# Patient Record
Sex: FEMALE | Race: WHITE | NOT HISPANIC OR LATINO | Employment: FULL TIME | URBAN - METROPOLITAN AREA
[De-identification: names, ages, dates, MRNs, and addresses within clinical notes are randomized per-mention and may not be internally consistent; named-entity substitution may affect disease eponyms.]

---

## 2017-01-16 ENCOUNTER — OFFICE VISIT - HEALTHEAST (OUTPATIENT)
Dept: FAMILY MEDICINE | Facility: CLINIC | Age: 27
End: 2017-01-16

## 2017-01-16 DIAGNOSIS — L70.0 ACNE VULGARIS: ICD-10-CM

## 2017-01-16 DIAGNOSIS — E03.9 HYPOTHYROIDISM: ICD-10-CM

## 2017-01-16 DIAGNOSIS — J02.9 SORE THROAT: ICD-10-CM

## 2017-01-16 DIAGNOSIS — J01.90 ACUTE SINUSITIS: ICD-10-CM

## 2017-03-16 ENCOUNTER — COMMUNICATION - HEALTHEAST (OUTPATIENT)
Dept: LAB | Facility: CLINIC | Age: 27
End: 2017-03-16

## 2017-03-16 DIAGNOSIS — E03.9 HYPOTHYROIDISM: ICD-10-CM

## 2017-03-17 ENCOUNTER — AMBULATORY - HEALTHEAST (OUTPATIENT)
Dept: LAB | Facility: CLINIC | Age: 27
End: 2017-03-17

## 2017-03-17 DIAGNOSIS — E03.9 HYPOTHYROIDISM: ICD-10-CM

## 2017-04-06 ENCOUNTER — COMMUNICATION - HEALTHEAST (OUTPATIENT)
Dept: FAMILY MEDICINE | Facility: CLINIC | Age: 27
End: 2017-04-06

## 2017-04-06 DIAGNOSIS — E03.9 HYPOTHYROIDISM: ICD-10-CM

## 2017-04-28 ENCOUNTER — OFFICE VISIT - HEALTHEAST (OUTPATIENT)
Dept: FAMILY MEDICINE | Facility: CLINIC | Age: 27
End: 2017-04-28

## 2017-04-28 DIAGNOSIS — Z00.00 ROUTINE GENERAL MEDICAL EXAMINATION AT A HEALTH CARE FACILITY: ICD-10-CM

## 2017-04-28 DIAGNOSIS — D64.9 ANEMIA, UNSPECIFIED: ICD-10-CM

## 2017-04-28 DIAGNOSIS — L70.9 ACNE: ICD-10-CM

## 2017-04-28 DIAGNOSIS — T78.40XA ALLERGIC REACTION: ICD-10-CM

## 2017-04-28 LAB
CHOLEST SERPL-MCNC: 153 MG/DL
FASTING STATUS PATIENT QL REPORTED: YES
HDLC SERPL-MCNC: 48 MG/DL
LDLC SERPL CALC-MCNC: 67 MG/DL
TRIGL SERPL-MCNC: 192 MG/DL

## 2017-04-28 ASSESSMENT — MIFFLIN-ST. JEOR: SCORE: 1479.62

## 2017-05-04 LAB
BKR LAB AP ABNORMAL BLEEDING: NO
BKR LAB AP BIRTH CONTROL/HORMONES: NORMAL
BKR LAB AP CERVICAL APPEARANCE: NORMAL
BKR LAB AP GYN ADEQUACY: NORMAL
BKR LAB AP GYN INTERPRETATION: NORMAL
BKR LAB AP HPV REFLEX: NORMAL
BKR LAB AP LMP: NORMAL
BKR LAB AP PATIENT STATUS: NORMAL
BKR LAB AP PREVIOUS ABNORMAL: NORMAL
BKR LAB AP PREVIOUS NORMAL: NORMAL
HIGH RISK?: NO
HPV INTERPRETATION - HISTORICAL: NORMAL
HPV INTERPRETER - HISTORICAL: NORMAL
PATH REPORT.COMMENTS IMP SPEC: NORMAL
RESULT FLAG (HE HISTORICAL CONVERSION): NORMAL

## 2017-07-04 ENCOUNTER — COMMUNICATION - HEALTHEAST (OUTPATIENT)
Dept: FAMILY MEDICINE | Facility: CLINIC | Age: 27
End: 2017-07-04

## 2017-07-04 DIAGNOSIS — E03.9 HYPOTHYROIDISM: ICD-10-CM

## 2017-08-20 ENCOUNTER — COMMUNICATION - HEALTHEAST (OUTPATIENT)
Dept: FAMILY MEDICINE | Facility: CLINIC | Age: 27
End: 2017-08-20

## 2017-09-30 ENCOUNTER — COMMUNICATION - HEALTHEAST (OUTPATIENT)
Dept: FAMILY MEDICINE | Facility: CLINIC | Age: 27
End: 2017-09-30

## 2017-09-30 DIAGNOSIS — L70.9 ACNE: ICD-10-CM

## 2017-12-10 ENCOUNTER — COMMUNICATION - HEALTHEAST (OUTPATIENT)
Dept: FAMILY MEDICINE | Facility: CLINIC | Age: 27
End: 2017-12-10

## 2017-12-10 DIAGNOSIS — E03.9 HYPOTHYROIDISM: ICD-10-CM

## 2018-01-16 ENCOUNTER — OFFICE VISIT - HEALTHEAST (OUTPATIENT)
Dept: FAMILY MEDICINE | Facility: CLINIC | Age: 28
End: 2018-01-16

## 2018-01-16 DIAGNOSIS — F39 MOOD DISORDER (H): ICD-10-CM

## 2018-01-16 DIAGNOSIS — F41.9 ANXIETY: ICD-10-CM

## 2018-01-16 DIAGNOSIS — E03.9 HYPOTHYROIDISM: ICD-10-CM

## 2018-01-16 LAB
T4 FREE SERPL-MCNC: 1.2 NG/DL (ref 0.7–1.8)
TSH SERPL DL<=0.005 MIU/L-ACNC: 1.85 UIU/ML (ref 0.3–5)

## 2018-01-17 ENCOUNTER — AMBULATORY - HEALTHEAST (OUTPATIENT)
Dept: FAMILY MEDICINE | Facility: CLINIC | Age: 28
End: 2018-01-17

## 2018-01-17 DIAGNOSIS — F41.9 ANXIETY: ICD-10-CM

## 2018-01-17 LAB — 25(OH)D3 SERPL-MCNC: 30 NG/ML (ref 30–80)

## 2018-01-30 ENCOUNTER — COMMUNICATION - HEALTHEAST (OUTPATIENT)
Dept: FAMILY MEDICINE | Facility: CLINIC | Age: 28
End: 2018-01-30

## 2018-01-30 DIAGNOSIS — F39 MOOD DISORDER (H): ICD-10-CM

## 2018-01-30 DIAGNOSIS — F41.9 ANXIETY: ICD-10-CM

## 2018-02-11 ENCOUNTER — COMMUNICATION - HEALTHEAST (OUTPATIENT)
Dept: FAMILY MEDICINE | Facility: CLINIC | Age: 28
End: 2018-02-11

## 2018-02-11 ENCOUNTER — COMMUNICATION - HEALTHEAST (OUTPATIENT)
Dept: SCHEDULING | Facility: CLINIC | Age: 28
End: 2018-02-11

## 2018-02-11 DIAGNOSIS — A09 TRAVELER'S DIARRHEA: ICD-10-CM

## 2018-02-11 DIAGNOSIS — F41.9 ANXIETY: ICD-10-CM

## 2018-02-13 ENCOUNTER — COMMUNICATION - HEALTHEAST (OUTPATIENT)
Dept: FAMILY MEDICINE | Facility: CLINIC | Age: 28
End: 2018-02-13

## 2018-02-13 DIAGNOSIS — F41.9 ANXIETY: ICD-10-CM

## 2018-03-04 ENCOUNTER — COMMUNICATION - HEALTHEAST (OUTPATIENT)
Dept: FAMILY MEDICINE | Facility: CLINIC | Age: 28
End: 2018-03-04

## 2018-03-04 DIAGNOSIS — L70.9 ACNE: ICD-10-CM

## 2018-03-25 ENCOUNTER — COMMUNICATION - HEALTHEAST (OUTPATIENT)
Dept: FAMILY MEDICINE | Facility: CLINIC | Age: 28
End: 2018-03-25

## 2018-03-25 DIAGNOSIS — F41.9 ANXIETY: ICD-10-CM

## 2018-04-02 ENCOUNTER — COMMUNICATION - HEALTHEAST (OUTPATIENT)
Dept: ADMINISTRATIVE | Facility: CLINIC | Age: 28
End: 2018-04-02

## 2018-06-02 ENCOUNTER — COMMUNICATION - HEALTHEAST (OUTPATIENT)
Dept: FAMILY MEDICINE | Facility: CLINIC | Age: 28
End: 2018-06-02

## 2018-06-02 DIAGNOSIS — E03.9 HYPOTHYROIDISM: ICD-10-CM

## 2018-07-19 ENCOUNTER — COMMUNICATION - HEALTHEAST (OUTPATIENT)
Dept: FAMILY MEDICINE | Facility: CLINIC | Age: 28
End: 2018-07-19

## 2018-08-13 ENCOUNTER — COMMUNICATION - HEALTHEAST (OUTPATIENT)
Dept: FAMILY MEDICINE | Facility: CLINIC | Age: 28
End: 2018-08-13

## 2018-08-13 DIAGNOSIS — E03.9 HYPOTHYROIDISM: ICD-10-CM

## 2018-08-25 ENCOUNTER — COMMUNICATION - HEALTHEAST (OUTPATIENT)
Dept: FAMILY MEDICINE | Facility: CLINIC | Age: 28
End: 2018-08-25

## 2018-08-25 DIAGNOSIS — L70.9 ACNE: ICD-10-CM

## 2018-09-28 ENCOUNTER — OFFICE VISIT - HEALTHEAST (OUTPATIENT)
Dept: FAMILY MEDICINE | Facility: CLINIC | Age: 28
End: 2018-09-28

## 2018-09-28 DIAGNOSIS — E06.3 HYPOTHYROIDISM DUE TO HASHIMOTO'S THYROIDITIS: ICD-10-CM

## 2018-09-28 DIAGNOSIS — Z79.899 HIGH RISK MEDICATION USE: ICD-10-CM

## 2018-09-28 DIAGNOSIS — L70.0 ACNE VULGARIS: ICD-10-CM

## 2018-09-28 DIAGNOSIS — F34.1 DYSTHYMIC DISORDER: ICD-10-CM

## 2018-09-28 DIAGNOSIS — J30.9 ALLERGIC RHINITIS: ICD-10-CM

## 2018-09-28 LAB
ALBUMIN SERPL-MCNC: 3.8 G/DL (ref 3.5–5)
ALP SERPL-CCNC: 34 U/L (ref 45–120)
ALT SERPL W P-5'-P-CCNC: 17 U/L (ref 0–45)
ANION GAP SERPL CALCULATED.3IONS-SCNC: 8 MMOL/L (ref 5–18)
AST SERPL W P-5'-P-CCNC: 13 U/L (ref 0–40)
BILIRUB SERPL-MCNC: 0.4 MG/DL (ref 0–1)
BUN SERPL-MCNC: 8 MG/DL (ref 8–22)
CALCIUM SERPL-MCNC: 9.4 MG/DL (ref 8.5–10.5)
CHLORIDE BLD-SCNC: 106 MMOL/L (ref 98–107)
CO2 SERPL-SCNC: 25 MMOL/L (ref 22–31)
CREAT SERPL-MCNC: 0.79 MG/DL (ref 0.6–1.1)
GFR SERPL CREATININE-BSD FRML MDRD: >60 ML/MIN/1.73M2
GLUCOSE BLD-MCNC: 90 MG/DL (ref 70–125)
POTASSIUM BLD-SCNC: 4.1 MMOL/L (ref 3.5–5)
PROT SERPL-MCNC: 6.5 G/DL (ref 6–8)
SODIUM SERPL-SCNC: 139 MMOL/L (ref 136–145)
TSH SERPL DL<=0.005 MIU/L-ACNC: 0.19 UIU/ML (ref 0.3–5)

## 2018-10-01 ENCOUNTER — COMMUNICATION - HEALTHEAST (OUTPATIENT)
Dept: FAMILY MEDICINE | Facility: CLINIC | Age: 28
End: 2018-10-01

## 2019-06-25 ENCOUNTER — COMMUNICATION - HEALTHEAST (OUTPATIENT)
Dept: FAMILY MEDICINE | Facility: CLINIC | Age: 29
End: 2019-06-25

## 2019-07-03 ENCOUNTER — AMBULATORY - HEALTHEAST (OUTPATIENT)
Dept: LAB | Facility: CLINIC | Age: 29
End: 2019-07-03

## 2019-07-03 DIAGNOSIS — E06.3 HYPOTHYROIDISM DUE TO HASHIMOTO'S THYROIDITIS: ICD-10-CM

## 2019-07-03 LAB — TSH SERPL DL<=0.005 MIU/L-ACNC: 1.86 UIU/ML (ref 0.3–5)

## 2019-07-05 ENCOUNTER — COMMUNICATION - HEALTHEAST (OUTPATIENT)
Dept: FAMILY MEDICINE | Facility: CLINIC | Age: 29
End: 2019-07-05

## 2019-07-10 ENCOUNTER — COMMUNICATION - HEALTHEAST (OUTPATIENT)
Dept: FAMILY MEDICINE | Facility: CLINIC | Age: 29
End: 2019-07-10

## 2019-09-24 ENCOUNTER — COMMUNICATION - HEALTHEAST (OUTPATIENT)
Dept: FAMILY MEDICINE | Facility: CLINIC | Age: 29
End: 2019-09-24

## 2019-09-26 ENCOUNTER — COMMUNICATION - HEALTHEAST (OUTPATIENT)
Dept: FAMILY MEDICINE | Facility: CLINIC | Age: 29
End: 2019-09-26

## 2019-10-02 ENCOUNTER — COMMUNICATION - HEALTHEAST (OUTPATIENT)
Dept: FAMILY MEDICINE | Facility: CLINIC | Age: 29
End: 2019-10-02

## 2019-10-02 ENCOUNTER — OFFICE VISIT - HEALTHEAST (OUTPATIENT)
Dept: FAMILY MEDICINE | Facility: CLINIC | Age: 29
End: 2019-10-02

## 2019-10-02 DIAGNOSIS — F51.01 PRIMARY INSOMNIA: ICD-10-CM

## 2019-10-02 DIAGNOSIS — Z30.9 ENCOUNTER FOR CONTRACEPTIVE MANAGEMENT, UNSPECIFIED TYPE: ICD-10-CM

## 2019-10-02 RX ORDER — CETIRIZINE HYDROCHLORIDE 10 MG/1
10 TABLET ORAL DAILY
Status: SHIPPED | COMMUNITY
Start: 2019-10-02

## 2019-10-02 ASSESSMENT — MIFFLIN-ST. JEOR: SCORE: 1450.13

## 2019-11-02 ENCOUNTER — COMMUNICATION - HEALTHEAST (OUTPATIENT)
Dept: FAMILY MEDICINE | Facility: CLINIC | Age: 29
End: 2019-11-02

## 2019-11-02 DIAGNOSIS — F51.01 PRIMARY INSOMNIA: ICD-10-CM

## 2019-11-05 ENCOUNTER — COMMUNICATION - HEALTHEAST (OUTPATIENT)
Dept: FAMILY MEDICINE | Facility: CLINIC | Age: 29
End: 2019-11-05

## 2019-11-22 ENCOUNTER — OFFICE VISIT - HEALTHEAST (OUTPATIENT)
Dept: FAMILY MEDICINE | Facility: CLINIC | Age: 29
End: 2019-11-22

## 2019-11-22 DIAGNOSIS — F34.1 DYSTHYMIC DISORDER: ICD-10-CM

## 2019-11-22 DIAGNOSIS — E03.9 ACQUIRED HYPOTHYROIDISM: ICD-10-CM

## 2019-11-22 DIAGNOSIS — F51.01 PRIMARY INSOMNIA: ICD-10-CM

## 2019-11-22 DIAGNOSIS — L70.0 ACNE VULGARIS: ICD-10-CM

## 2019-11-22 LAB
ALBUMIN SERPL-MCNC: 4 G/DL (ref 3.5–5)
ALP SERPL-CCNC: 33 U/L (ref 45–120)
ALT SERPL W P-5'-P-CCNC: 15 U/L (ref 0–45)
ANION GAP SERPL CALCULATED.3IONS-SCNC: 9 MMOL/L (ref 5–18)
AST SERPL W P-5'-P-CCNC: 13 U/L (ref 0–40)
BILIRUB SERPL-MCNC: 0.5 MG/DL (ref 0–1)
BUN SERPL-MCNC: 8 MG/DL (ref 8–22)
CALCIUM SERPL-MCNC: 9.5 MG/DL (ref 8.5–10.5)
CHLORIDE BLD-SCNC: 108 MMOL/L (ref 98–107)
CO2 SERPL-SCNC: 24 MMOL/L (ref 22–31)
CREAT SERPL-MCNC: 0.9 MG/DL (ref 0.6–1.1)
GFR SERPL CREATININE-BSD FRML MDRD: >60 ML/MIN/1.73M2
GLUCOSE BLD-MCNC: 72 MG/DL (ref 70–125)
POTASSIUM BLD-SCNC: 4.7 MMOL/L (ref 3.5–5)
PROT SERPL-MCNC: 6.9 G/DL (ref 6–8)
SODIUM SERPL-SCNC: 141 MMOL/L (ref 136–145)

## 2019-11-22 ASSESSMENT — ANXIETY QUESTIONNAIRES
4. TROUBLE RELAXING: SEVERAL DAYS
1. FEELING NERVOUS, ANXIOUS, OR ON EDGE: SEVERAL DAYS
7. FEELING AFRAID AS IF SOMETHING AWFUL MIGHT HAPPEN: NOT AT ALL
3. WORRYING TOO MUCH ABOUT DIFFERENT THINGS: SEVERAL DAYS
6. BECOMING EASILY ANNOYED OR IRRITABLE: SEVERAL DAYS
IF YOU CHECKED OFF ANY PROBLEMS ON THIS QUESTIONNAIRE, HOW DIFFICULT HAVE THESE PROBLEMS MADE IT FOR YOU TO DO YOUR WORK, TAKE CARE OF THINGS AT HOME, OR GET ALONG WITH OTHER PEOPLE: SOMEWHAT DIFFICULT
GAD7 TOTAL SCORE: 5
2. NOT BEING ABLE TO STOP OR CONTROL WORRYING: SEVERAL DAYS
5. BEING SO RESTLESS THAT IT IS HARD TO SIT STILL: NOT AT ALL

## 2019-11-22 ASSESSMENT — PATIENT HEALTH QUESTIONNAIRE - PHQ9: SUM OF ALL RESPONSES TO PHQ QUESTIONS 1-9: 7

## 2019-12-06 ENCOUNTER — RECORDS - HEALTHEAST (OUTPATIENT)
Dept: ADMINISTRATIVE | Facility: OTHER | Age: 29
End: 2019-12-06

## 2019-12-06 ENCOUNTER — OFFICE VISIT - HEALTHEAST (OUTPATIENT)
Dept: SLEEP MEDICINE | Facility: CLINIC | Age: 29
End: 2019-12-06

## 2019-12-06 DIAGNOSIS — G47.8 NON-RESTORATIVE SLEEP: ICD-10-CM

## 2019-12-06 DIAGNOSIS — G47.00 INSOMNIA, PERSISTENT: ICD-10-CM

## 2019-12-06 DIAGNOSIS — G47.10 HYPERSOMNIA: ICD-10-CM

## 2019-12-06 DIAGNOSIS — R06.83 SNORING: ICD-10-CM

## 2019-12-06 DIAGNOSIS — G47.21 CIRCADIAN RHYTHM SLEEP DISORDER, DELAYED SLEEP PHASE TYPE: ICD-10-CM

## 2019-12-06 ASSESSMENT — MIFFLIN-ST. JEOR: SCORE: 1441.06

## 2020-01-03 ENCOUNTER — COMMUNICATION - HEALTHEAST (OUTPATIENT)
Dept: FAMILY MEDICINE | Facility: CLINIC | Age: 30
End: 2020-01-03

## 2020-01-03 DIAGNOSIS — E06.3 HYPOTHYROIDISM DUE TO HASHIMOTO'S THYROIDITIS: ICD-10-CM

## 2020-02-10 ENCOUNTER — COMMUNICATION - HEALTHEAST (OUTPATIENT)
Dept: FAMILY MEDICINE | Facility: CLINIC | Age: 30
End: 2020-02-10

## 2020-02-10 DIAGNOSIS — L70.0 ACNE VULGARIS: ICD-10-CM

## 2020-06-22 ENCOUNTER — COMMUNICATION - HEALTHEAST (OUTPATIENT)
Dept: FAMILY MEDICINE | Facility: CLINIC | Age: 30
End: 2020-06-22

## 2020-06-22 DIAGNOSIS — E06.3 HYPOTHYROIDISM DUE TO HASHIMOTO'S THYROIDITIS: ICD-10-CM

## 2020-09-18 ENCOUNTER — OFFICE VISIT - HEALTHEAST (OUTPATIENT)
Dept: FAMILY MEDICINE | Facility: CLINIC | Age: 30
End: 2020-09-18

## 2020-09-18 DIAGNOSIS — D64.9 ANEMIA, UNSPECIFIED TYPE: ICD-10-CM

## 2020-09-18 DIAGNOSIS — E03.9 ACQUIRED HYPOTHYROIDISM: ICD-10-CM

## 2020-09-18 DIAGNOSIS — F51.01 PRIMARY INSOMNIA: ICD-10-CM

## 2020-09-18 DIAGNOSIS — R53.83 FATIGUE, UNSPECIFIED TYPE: ICD-10-CM

## 2020-09-18 DIAGNOSIS — J30.2 SEASONAL ALLERGIC RHINITIS, UNSPECIFIED TRIGGER: ICD-10-CM

## 2020-09-18 DIAGNOSIS — F34.1 DYSTHYMIC DISORDER: ICD-10-CM

## 2020-09-18 LAB
ALBUMIN SERPL-MCNC: 3.8 G/DL (ref 3.5–5)
ALP SERPL-CCNC: 36 U/L (ref 45–120)
ALT SERPL W P-5'-P-CCNC: 18 U/L (ref 0–45)
ANION GAP SERPL CALCULATED.3IONS-SCNC: 10 MMOL/L (ref 5–18)
AST SERPL W P-5'-P-CCNC: 12 U/L (ref 0–40)
BILIRUB SERPL-MCNC: 0.3 MG/DL (ref 0–1)
BUN SERPL-MCNC: 9 MG/DL (ref 8–22)
CALCIUM SERPL-MCNC: 9.5 MG/DL (ref 8.5–10.5)
CHLORIDE BLD-SCNC: 105 MMOL/L (ref 98–107)
CO2 SERPL-SCNC: 25 MMOL/L (ref 22–31)
CREAT SERPL-MCNC: 0.9 MG/DL (ref 0.6–1.1)
ERYTHROCYTE [DISTWIDTH] IN BLOOD BY AUTOMATED COUNT: 11.1 % (ref 11–14.5)
GFR SERPL CREATININE-BSD FRML MDRD: >60 ML/MIN/1.73M2
GLUCOSE BLD-MCNC: 95 MG/DL (ref 70–125)
HCT VFR BLD AUTO: 43.4 % (ref 35–47)
HGB BLD-MCNC: 14.9 G/DL (ref 12–16)
MCH RBC QN AUTO: 30.1 PG (ref 27–34)
MCHC RBC AUTO-ENTMCNC: 34.4 G/DL (ref 32–36)
MCV RBC AUTO: 88 FL (ref 80–100)
PLATELET # BLD AUTO: 230 THOU/UL (ref 140–440)
PMV BLD AUTO: 7.6 FL (ref 7–10)
POTASSIUM BLD-SCNC: 4.4 MMOL/L (ref 3.5–5)
PROT SERPL-MCNC: 6.8 G/DL (ref 6–8)
RBC # BLD AUTO: 4.96 MILL/UL (ref 3.8–5.4)
SODIUM SERPL-SCNC: 140 MMOL/L (ref 136–145)
T4 FREE SERPL-MCNC: 1 NG/DL (ref 0.7–1.8)
TSH SERPL DL<=0.005 MIU/L-ACNC: 3.39 UIU/ML (ref 0.3–5)
WBC: 4.6 THOU/UL (ref 4–11)

## 2020-09-18 RX ORDER — AZELASTINE 1 MG/ML
1 SPRAY, METERED NASAL 2 TIMES DAILY
Qty: 30 ML | Refills: 0 | Status: SHIPPED | OUTPATIENT
Start: 2020-09-18 | End: 2022-12-30

## 2020-09-18 RX ORDER — FAMOTIDINE 20 MG/1
20 TABLET, FILM COATED ORAL 2 TIMES DAILY
Status: SHIPPED | COMMUNITY
Start: 2020-09-18 | End: 2021-10-06

## 2020-09-18 ASSESSMENT — ANXIETY QUESTIONNAIRES
5. BEING SO RESTLESS THAT IT IS HARD TO SIT STILL: NOT AT ALL
6. BECOMING EASILY ANNOYED OR IRRITABLE: SEVERAL DAYS
IF YOU CHECKED OFF ANY PROBLEMS ON THIS QUESTIONNAIRE, HOW DIFFICULT HAVE THESE PROBLEMS MADE IT FOR YOU TO DO YOUR WORK, TAKE CARE OF THINGS AT HOME, OR GET ALONG WITH OTHER PEOPLE: NOT DIFFICULT AT ALL
7. FEELING AFRAID AS IF SOMETHING AWFUL MIGHT HAPPEN: NOT AT ALL
4. TROUBLE RELAXING: SEVERAL DAYS
GAD7 TOTAL SCORE: 5
2. NOT BEING ABLE TO STOP OR CONTROL WORRYING: SEVERAL DAYS
1. FEELING NERVOUS, ANXIOUS, OR ON EDGE: SEVERAL DAYS
3. WORRYING TOO MUCH ABOUT DIFFERENT THINGS: SEVERAL DAYS

## 2020-09-18 ASSESSMENT — MIFFLIN-ST. JEOR: SCORE: 1474.51

## 2020-09-18 ASSESSMENT — PATIENT HEALTH QUESTIONNAIRE - PHQ9: SUM OF ALL RESPONSES TO PHQ QUESTIONS 1-9: 6

## 2020-09-29 ENCOUNTER — COMMUNICATION - HEALTHEAST (OUTPATIENT)
Dept: FAMILY MEDICINE | Facility: CLINIC | Age: 30
End: 2020-09-29

## 2020-09-29 DIAGNOSIS — Z30.9 ENCOUNTER FOR CONTRACEPTIVE MANAGEMENT, UNSPECIFIED TYPE: ICD-10-CM

## 2020-11-05 ENCOUNTER — OFFICE VISIT - HEALTHEAST (OUTPATIENT)
Dept: FAMILY MEDICINE | Facility: CLINIC | Age: 30
End: 2020-11-05

## 2020-11-05 DIAGNOSIS — R53.82 CHRONIC FATIGUE: ICD-10-CM

## 2020-11-05 ASSESSMENT — ANXIETY QUESTIONNAIRES
GAD7 TOTAL SCORE: 9
3. WORRYING TOO MUCH ABOUT DIFFERENT THINGS: MORE THAN HALF THE DAYS
2. NOT BEING ABLE TO STOP OR CONTROL WORRYING: MORE THAN HALF THE DAYS
IF YOU CHECKED OFF ANY PROBLEMS ON THIS QUESTIONNAIRE, HOW DIFFICULT HAVE THESE PROBLEMS MADE IT FOR YOU TO DO YOUR WORK, TAKE CARE OF THINGS AT HOME, OR GET ALONG WITH OTHER PEOPLE: SOMEWHAT DIFFICULT
7. FEELING AFRAID AS IF SOMETHING AWFUL MIGHT HAPPEN: NOT AT ALL
6. BECOMING EASILY ANNOYED OR IRRITABLE: NOT AT ALL
5. BEING SO RESTLESS THAT IT IS HARD TO SIT STILL: MORE THAN HALF THE DAYS
1. FEELING NERVOUS, ANXIOUS, OR ON EDGE: MORE THAN HALF THE DAYS
4. TROUBLE RELAXING: SEVERAL DAYS

## 2020-11-05 ASSESSMENT — PATIENT HEALTH QUESTIONNAIRE - PHQ9: SUM OF ALL RESPONSES TO PHQ QUESTIONS 1-9: 10

## 2020-12-29 ENCOUNTER — OFFICE VISIT - HEALTHEAST (OUTPATIENT)
Dept: FAMILY MEDICINE | Facility: CLINIC | Age: 30
End: 2020-12-29

## 2020-12-29 DIAGNOSIS — M79.671 RIGHT FOOT PAIN: ICD-10-CM

## 2020-12-29 ASSESSMENT — MIFFLIN-ST. JEOR: SCORE: 1472.81

## 2021-01-06 ENCOUNTER — OFFICE VISIT - HEALTHEAST (OUTPATIENT)
Dept: PODIATRY | Facility: CLINIC | Age: 31
End: 2021-01-06

## 2021-01-06 DIAGNOSIS — G58.8 NEUROMA DIGITAL NERVE: ICD-10-CM

## 2021-01-06 ASSESSMENT — MIFFLIN-ST. JEOR: SCORE: 1472.81

## 2021-01-08 ENCOUNTER — COMMUNICATION - HEALTHEAST (OUTPATIENT)
Dept: OTHER | Facility: CLINIC | Age: 31
End: 2021-01-08

## 2021-01-13 ENCOUNTER — COMMUNICATION - HEALTHEAST (OUTPATIENT)
Dept: OTHER | Facility: CLINIC | Age: 31
End: 2021-01-13

## 2021-01-20 ENCOUNTER — COMMUNICATION - HEALTHEAST (OUTPATIENT)
Dept: OTHER | Facility: CLINIC | Age: 31
End: 2021-01-20

## 2021-01-28 ENCOUNTER — AMBULATORY - HEALTHEAST (OUTPATIENT)
Dept: NURSING | Facility: CLINIC | Age: 31
End: 2021-01-28

## 2021-01-29 ENCOUNTER — OFFICE VISIT - HEALTHEAST (OUTPATIENT)
Dept: FAMILY MEDICINE | Facility: CLINIC | Age: 31
End: 2021-01-29

## 2021-01-29 DIAGNOSIS — Z00.00 ROUTINE GENERAL MEDICAL EXAMINATION AT A HEALTH CARE FACILITY: ICD-10-CM

## 2021-01-29 DIAGNOSIS — E03.9 ACQUIRED HYPOTHYROIDISM: ICD-10-CM

## 2021-01-29 DIAGNOSIS — D64.9 ANEMIA, UNSPECIFIED TYPE: ICD-10-CM

## 2021-01-29 DIAGNOSIS — K21.9 GASTROESOPHAGEAL REFLUX DISEASE WITHOUT ESOPHAGITIS: ICD-10-CM

## 2021-01-29 DIAGNOSIS — F34.1 DYSTHYMIC DISORDER: ICD-10-CM

## 2021-01-29 DIAGNOSIS — L70.0 ACNE VULGARIS: ICD-10-CM

## 2021-01-29 DIAGNOSIS — J30.9 ALLERGIC RHINITIS, UNSPECIFIED SEASONALITY, UNSPECIFIED TRIGGER: ICD-10-CM

## 2021-01-29 DIAGNOSIS — F51.01 PRIMARY INSOMNIA: ICD-10-CM

## 2021-01-29 DIAGNOSIS — Z30.9 ENCOUNTER FOR CONTRACEPTIVE MANAGEMENT, UNSPECIFIED TYPE: ICD-10-CM

## 2021-01-29 LAB
ALBUMIN SERPL-MCNC: 3.9 G/DL (ref 3.5–5)
ALBUMIN UR-MCNC: NEGATIVE MG/DL
ALP SERPL-CCNC: 43 U/L (ref 45–120)
ALT SERPL W P-5'-P-CCNC: 22 U/L (ref 0–45)
ANION GAP SERPL CALCULATED.3IONS-SCNC: 6 MMOL/L (ref 5–18)
APPEARANCE UR: CLEAR
AST SERPL W P-5'-P-CCNC: 15 U/L (ref 0–40)
BACTERIA #/AREA URNS HPF: ABNORMAL HPF
BILIRUB SERPL-MCNC: 0.6 MG/DL (ref 0–1)
BILIRUB UR QL STRIP: NEGATIVE
BUN SERPL-MCNC: 8 MG/DL (ref 8–22)
CALCIUM SERPL-MCNC: 9.1 MG/DL (ref 8.5–10.5)
CHLORIDE BLD-SCNC: 107 MMOL/L (ref 98–107)
CHOLEST SERPL-MCNC: 123 MG/DL
CO2 SERPL-SCNC: 27 MMOL/L (ref 22–31)
COLOR UR AUTO: YELLOW
CREAT SERPL-MCNC: 0.8 MG/DL (ref 0.6–1.1)
ERYTHROCYTE [DISTWIDTH] IN BLOOD BY AUTOMATED COUNT: 11.1 % (ref 11–14.5)
FASTING STATUS PATIENT QL REPORTED: YES
GFR SERPL CREATININE-BSD FRML MDRD: >60 ML/MIN/1.73M2
GLUCOSE BLD-MCNC: 76 MG/DL (ref 70–125)
GLUCOSE UR STRIP-MCNC: NEGATIVE MG/DL
HCT VFR BLD AUTO: 42.9 % (ref 35–47)
HDLC SERPL-MCNC: 51 MG/DL
HGB BLD-MCNC: 14.3 G/DL (ref 12–16)
HGB UR QL STRIP: ABNORMAL
KETONES UR STRIP-MCNC: NEGATIVE MG/DL
LDLC SERPL CALC-MCNC: 58 MG/DL
LEUKOCYTE ESTERASE UR QL STRIP: NEGATIVE
MCH RBC QN AUTO: 29.7 PG (ref 27–34)
MCHC RBC AUTO-ENTMCNC: 33.4 G/DL (ref 32–36)
MCV RBC AUTO: 89 FL (ref 80–100)
NITRATE UR QL: NEGATIVE
PH UR STRIP: 6 [PH] (ref 5–8)
PLATELET # BLD AUTO: 200 THOU/UL (ref 140–440)
PMV BLD AUTO: 7.4 FL (ref 7–10)
POTASSIUM BLD-SCNC: 3.9 MMOL/L (ref 3.5–5)
PROT SERPL-MCNC: 6.4 G/DL (ref 6–8)
RBC # BLD AUTO: 4.82 MILL/UL (ref 3.8–5.4)
RBC #/AREA URNS AUTO: ABNORMAL HPF
SODIUM SERPL-SCNC: 140 MMOL/L (ref 136–145)
SP GR UR STRIP: 1.01 (ref 1–1.03)
SQUAMOUS #/AREA URNS AUTO: ABNORMAL LPF
T4 FREE SERPL-MCNC: 0.9 NG/DL (ref 0.7–1.8)
TRIGL SERPL-MCNC: 68 MG/DL
TSH SERPL DL<=0.005 MIU/L-ACNC: 0.03 UIU/ML (ref 0.3–5)
UROBILINOGEN UR STRIP-ACNC: ABNORMAL
WBC #/AREA URNS AUTO: ABNORMAL HPF
WBC: 4.9 THOU/UL (ref 4–11)

## 2021-01-29 RX ORDER — LEVONORGESTREL / ETHINYL ESTRADIOL AND ETHINYL ESTRADIOL 150-30(84)
1 KIT ORAL DAILY
Qty: 1 PACKAGE | Refills: 4 | Status: SHIPPED | OUTPATIENT
Start: 2021-01-29 | End: 2021-10-14

## 2021-01-29 RX ORDER — SPIRONOLACTONE 50 MG/1
TABLET, FILM COATED ORAL
Qty: 180 TABLET | Refills: 1 | Status: SHIPPED | OUTPATIENT
Start: 2021-01-29 | End: 2021-07-25

## 2021-01-29 ASSESSMENT — ANXIETY QUESTIONNAIRES
GAD7 TOTAL SCORE: 13
4. TROUBLE RELAXING: MORE THAN HALF THE DAYS
2. NOT BEING ABLE TO STOP OR CONTROL WORRYING: MORE THAN HALF THE DAYS
5. BEING SO RESTLESS THAT IT IS HARD TO SIT STILL: SEVERAL DAYS
1. FEELING NERVOUS, ANXIOUS, OR ON EDGE: MORE THAN HALF THE DAYS
IF YOU CHECKED OFF ANY PROBLEMS ON THIS QUESTIONNAIRE, HOW DIFFICULT HAVE THESE PROBLEMS MADE IT FOR YOU TO DO YOUR WORK, TAKE CARE OF THINGS AT HOME, OR GET ALONG WITH OTHER PEOPLE: SOMEWHAT DIFFICULT
3. WORRYING TOO MUCH ABOUT DIFFERENT THINGS: MORE THAN HALF THE DAYS
7. FEELING AFRAID AS IF SOMETHING AWFUL MIGHT HAPPEN: MORE THAN HALF THE DAYS
6. BECOMING EASILY ANNOYED OR IRRITABLE: MORE THAN HALF THE DAYS

## 2021-01-29 ASSESSMENT — PATIENT HEALTH QUESTIONNAIRE - PHQ9: SUM OF ALL RESPONSES TO PHQ QUESTIONS 1-9: 8

## 2021-01-29 ASSESSMENT — MIFFLIN-ST. JEOR: SCORE: 1446.74

## 2021-02-01 ENCOUNTER — AMBULATORY - HEALTHEAST (OUTPATIENT)
Dept: FAMILY MEDICINE | Facility: CLINIC | Age: 31
End: 2021-02-01

## 2021-02-01 DIAGNOSIS — E03.9 ACQUIRED HYPOTHYROIDISM: ICD-10-CM

## 2021-02-01 LAB
HPV SOURCE: NORMAL
HUMAN PAPILLOMA VIRUS 16 DNA: NEGATIVE
HUMAN PAPILLOMA VIRUS 18 DNA: NEGATIVE
HUMAN PAPILLOMA VIRUS FINAL DIAGNOSIS: NORMAL
HUMAN PAPILLOMA VIRUS OTHER HR: NEGATIVE
SPECIMEN DESCRIPTION: NORMAL

## 2021-02-08 ENCOUNTER — COMMUNICATION - HEALTHEAST (OUTPATIENT)
Dept: FAMILY MEDICINE | Facility: CLINIC | Age: 31
End: 2021-02-08

## 2021-02-08 DIAGNOSIS — R53.82 CHRONIC FATIGUE: ICD-10-CM

## 2021-02-18 ENCOUNTER — AMBULATORY - HEALTHEAST (OUTPATIENT)
Dept: NURSING | Facility: CLINIC | Age: 31
End: 2021-02-18

## 2021-03-04 ENCOUNTER — OFFICE VISIT - HEALTHEAST (OUTPATIENT)
Dept: FAMILY MEDICINE | Facility: CLINIC | Age: 31
End: 2021-03-04

## 2021-03-04 DIAGNOSIS — F41.9 ANXIETY: ICD-10-CM

## 2021-03-04 DIAGNOSIS — F51.01 PRIMARY INSOMNIA: ICD-10-CM

## 2021-03-04 DIAGNOSIS — F33.0 MILD EPISODE OF RECURRENT MAJOR DEPRESSIVE DISORDER (H): ICD-10-CM

## 2021-03-04 ASSESSMENT — PATIENT HEALTH QUESTIONNAIRE - PHQ9: SUM OF ALL RESPONSES TO PHQ QUESTIONS 1-9: 12

## 2021-03-04 ASSESSMENT — ANXIETY QUESTIONNAIRES
3. WORRYING TOO MUCH ABOUT DIFFERENT THINGS: MORE THAN HALF THE DAYS
GAD7 TOTAL SCORE: 14
5. BEING SO RESTLESS THAT IT IS HARD TO SIT STILL: MORE THAN HALF THE DAYS
7. FEELING AFRAID AS IF SOMETHING AWFUL MIGHT HAPPEN: SEVERAL DAYS
6. BECOMING EASILY ANNOYED OR IRRITABLE: MORE THAN HALF THE DAYS
4. TROUBLE RELAXING: MORE THAN HALF THE DAYS
2. NOT BEING ABLE TO STOP OR CONTROL WORRYING: MORE THAN HALF THE DAYS
1. FEELING NERVOUS, ANXIOUS, OR ON EDGE: NEARLY EVERY DAY
IF YOU CHECKED OFF ANY PROBLEMS ON THIS QUESTIONNAIRE, HOW DIFFICULT HAVE THESE PROBLEMS MADE IT FOR YOU TO DO YOUR WORK, TAKE CARE OF THINGS AT HOME, OR GET ALONG WITH OTHER PEOPLE: VERY DIFFICULT

## 2021-03-10 ENCOUNTER — COMMUNICATION - HEALTHEAST (OUTPATIENT)
Dept: FAMILY MEDICINE | Facility: CLINIC | Age: 31
End: 2021-03-10

## 2021-03-20 ENCOUNTER — COMMUNICATION - HEALTHEAST (OUTPATIENT)
Dept: FAMILY MEDICINE | Facility: CLINIC | Age: 31
End: 2021-03-20

## 2021-03-20 DIAGNOSIS — F41.9 ANXIETY: ICD-10-CM

## 2021-03-20 DIAGNOSIS — R53.82 CHRONIC FATIGUE: ICD-10-CM

## 2021-03-20 DIAGNOSIS — F33.0 MILD EPISODE OF RECURRENT MAJOR DEPRESSIVE DISORDER (H): ICD-10-CM

## 2021-03-20 RX ORDER — LEVOTHYROXINE, LIOTHYRONINE 38; 9 UG/1; UG/1
TABLET ORAL
Qty: 90 TABLET | Refills: 3 | Status: SHIPPED | OUTPATIENT
Start: 2021-03-20 | End: 2022-02-18

## 2021-03-20 RX ORDER — ESCITALOPRAM OXALATE 5 MG/1
5 TABLET ORAL DAILY
Qty: 90 TABLET | Refills: 3 | Status: SHIPPED | OUTPATIENT
Start: 2021-03-20 | End: 2021-10-06

## 2021-03-25 ENCOUNTER — COMMUNICATION - HEALTHEAST (OUTPATIENT)
Dept: PODIATRY | Facility: CLINIC | Age: 31
End: 2021-03-25

## 2021-03-26 ENCOUNTER — AMBULATORY - HEALTHEAST (OUTPATIENT)
Dept: PODIATRY | Facility: CLINIC | Age: 31
End: 2021-03-26

## 2021-04-01 ENCOUNTER — COMMUNICATION - HEALTHEAST (OUTPATIENT)
Dept: FAMILY MEDICINE | Facility: CLINIC | Age: 31
End: 2021-04-01

## 2021-04-01 DIAGNOSIS — F51.01 PRIMARY INSOMNIA: ICD-10-CM

## 2021-04-02 RX ORDER — TRAZODONE HYDROCHLORIDE 50 MG/1
TABLET, FILM COATED ORAL
Qty: 90 TABLET | Refills: 3 | Status: SHIPPED | OUTPATIENT
Start: 2021-04-02 | End: 2022-12-30

## 2021-04-08 ENCOUNTER — OFFICE VISIT - HEALTHEAST (OUTPATIENT)
Dept: PODIATRY | Facility: CLINIC | Age: 31
End: 2021-04-08

## 2021-04-08 DIAGNOSIS — G58.8 NEUROMA DIGITAL NERVE: ICD-10-CM

## 2021-05-09 ENCOUNTER — COMMUNICATION - HEALTHEAST (OUTPATIENT)
Dept: FAMILY MEDICINE | Facility: CLINIC | Age: 31
End: 2021-05-09

## 2021-05-26 ASSESSMENT — PATIENT HEALTH QUESTIONNAIRE - PHQ9: SUM OF ALL RESPONSES TO PHQ QUESTIONS 1-9: 7

## 2021-05-27 VITALS — TEMPERATURE: 97.8 F | HEART RATE: 64 BPM | SYSTOLIC BLOOD PRESSURE: 122 MMHG | DIASTOLIC BLOOD PRESSURE: 70 MMHG

## 2021-05-27 ASSESSMENT — PATIENT HEALTH QUESTIONNAIRE - PHQ9
SUM OF ALL RESPONSES TO PHQ QUESTIONS 1-9: 10
SUM OF ALL RESPONSES TO PHQ QUESTIONS 1-9: 12
SUM OF ALL RESPONSES TO PHQ QUESTIONS 1-9: 6
SUM OF ALL RESPONSES TO PHQ QUESTIONS 1-9: 8

## 2021-05-28 ASSESSMENT — ANXIETY QUESTIONNAIRES
GAD7 TOTAL SCORE: 5
GAD7 TOTAL SCORE: 14
GAD7 TOTAL SCORE: 5
GAD7 TOTAL SCORE: 13
GAD7 TOTAL SCORE: 9

## 2021-05-30 VITALS — HEIGHT: 65 IN | BODY MASS INDEX: 27.91 KG/M2 | WEIGHT: 167.5 LBS

## 2021-05-30 VITALS — BODY MASS INDEX: 27.81 KG/M2 | WEIGHT: 169.7 LBS

## 2021-05-30 NOTE — TELEPHONE ENCOUNTER
Patient Returning Call  Reason for call:  Returning phone call.  Information relayed to patient: Below message was relayed to patient.  Patient stated she is going to schedule a lab only visit, and would like a return phone call when her results become available.  Patient has additional questions:  No  If YES, what are your questions/concerns:  No additional questions at this time.  Okay to leave a detailed message?: No call back needed

## 2021-05-30 NOTE — TELEPHONE ENCOUNTER
Refill Approved    Rx renewed per Medication Renewal Policy. Medication was last renewed on 9/28/18.    Kinsey Martines, [facility] Triage/Med Refill 7/10/2019     Requested Prescriptions   Pending Prescriptions Disp Refills     ESTARYLLA 0.25-35 mg-mcg per tablet [Pharmacy Med Name: ESTARYLLA TABLETS 28S] 84 tablet 0     Sig: TAKE 1 TABLET BY MOUTH DAILY       Oral Contraceptives Protocol Passed - 7/10/2019  6:51 AM        Passed - Visit with PCP or prescribing provider visit in last 12 months      Last office visit with prescriber/PCP: 9/28/2018 Gene Ruth MD OR same dept: 9/28/2018 Gene Ruth MD OR same specialty: 9/28/2018 Gene Ruth MD  Last physical: Visit date not found Last MTM visit: Visit date not found   Next visit within 3 mo: Visit date not found  Next physical within 3 mo: Visit date not found  Prescriber OR PCP: Gene Ruth MD  Last diagnosis associated with med order: 1. Contraception  - ESTARYLLA 0.25-35 mg-mcg per tablet [Pharmacy Med Name: ESTARYLLA TABLETS 28S]; TAKE 1 TABLET BY MOUTH DAILY  Dispense: 84 tablet; Refill: 0    If protocol passes may refill for 12 months if within 3 months of last provider visit (or a total of 15 months).

## 2021-05-30 NOTE — TELEPHONE ENCOUNTER
Orders being requested: thyroid test  Reason service is needed/diagnosis: patient reports a feeling of being off lately  When are orders needed by: asap  Where to send Orders: Phone:  please call patient  Okay to leave detailed message?  No

## 2021-05-30 NOTE — TELEPHONE ENCOUNTER
Left message to call back for: patient  Information to relay to patient:  There is a current order in place for TSH however this was to be done last fall. She technically can get this done since she has an order but the doctor may want to see her to discuss her dosing.   SONJADeGree, CMA

## 2021-05-31 VITALS — BODY MASS INDEX: 26.61 KG/M2 | WEIGHT: 161.13 LBS

## 2021-06-01 NOTE — TELEPHONE ENCOUNTER
Refill Approved    Rx renewed per Medication Renewal Policy. Medication was last renewed on 7/10/19    Jahaira Pérez, [facility] Triage/Med Refill 9/24/2019     Requested Prescriptions   Pending Prescriptions Disp Refills     ESTARYLLA 0.25-35 mg-mcg per tablet [Pharmacy Med Name: ESTARYLLA TABLETS 28S] 84 tablet 0     Sig: TAKE 1 TABLET BY MOUTH DAILY       Oral Contraceptives Protocol Passed - 9/24/2019  7:15 AM        Passed - Visit with PCP or prescribing provider visit in last 12 months      Last office visit with prescriber/PCP: 9/28/2018 Gene Ruth MD OR same dept: 9/28/2018 Gene Ruth MD OR same specialty: 9/28/2018 Gene Ruth MD  Last physical: Visit date not found Last MTM visit: Visit date not found   Next visit within 3 mo: Visit date not found  Next physical within 3 mo: Visit date not found  Prescriber OR PCP: Gene Ruth MD  Last diagnosis associated with med order: 1. Contraception  - ESTARYLLA 0.25-35 mg-mcg per tablet [Pharmacy Med Name: ESTARYLLA TABLETS 28S]; TAKE 1 TABLET BY MOUTH DAILY  Dispense: 84 tablet; Refill: 0    If protocol passes may refill for 12 months if within 3 months of last provider visit (or a total of 15 months).

## 2021-06-01 NOTE — PROGRESS NOTES
"ASSESSMENT/PLAN:    Encounter for contraceptive management, unspecified type  Trial extended use  Of note, she has tried depo provera, nexplanon, skipping the placebo pills from her current contraceptives without satisfaction  Expressed disinterest in IUD  No concerns with STIs  May want to consider pelvic ultrasound if still with irregular bleeding in the next 3-4 months  Normal TSH  F/u with PCP  -     L norgest/e.estradiol-e.estrad (SEASONIQUE) 0.15 mg-30 mcg (84)/10 mcg (7) 3MPk; Take 1 tablet by mouth daily.    Primary insomnia  Trial doxepin  Of note, she has tried trazodone, hydroxyzine, melatonin, clonazepam  Expressed disinterest in ambien  Has seen sleep clinic years ago  -     doxepin 3 mg Tab; Take 3 mg by mouth daily.      CHIEF COMPLAINT:  Chief Complaint   Patient presents with     discuss BC     wants pills     sleep issues     not sleeping well       HISTORY OF PRESENT ILLNESS:  Keyla is a 29 y.o. female presenting to the clinic today for birth control counseling and insomnia.     Birth Control: She has tried the depo shot before but she stopped it because she was on it for too long. She switch to the pill and had \"issues\" with that. Then she tried the nexplanon and had \"issues\" with that. Now she is on the regular hormone pill. Her periods will last about 2 weeks long. If she skips the sugar pill week, she will spot for a couple days. She had her thyroid checked which was normal. She has thought about IUD's but is afraid about constant bleeding like with the nexplanon. She recently started her new pack of pills about 5 days ago and is still bleeding. She is not worried about STDs. She takes spironolactone during her cycles to prevent major break outs.     Insomnia: She has had issues sleeping since she was 15 years old. She feels like recently, it has been more difficult to stay asleep. She has been getting about 3-4 hours of sleep a night. She is not anxious and does not think it is due to an " "overactive brain. She stops using electronics at 8pm and does not drink caffeine after 2pm. She has not used hydroxyzine in a while. When she used it, it helped a little but she would still wake up at 2am. She has also tried trazadone which worked for a week and then stopped working. She will also take melatonin.     REVIEW OF SYSTEMS:   Constitutional: Negative.   HENT: Negative.   Eyes: Negative.   Respiratory: Negative.   Cardiovascular: Negative.   Gastrointestinal: Negative.   Endocrine: Negative.   Genitourinary: Negative.   Musculoskeletal: Negative.   Skin: Negative.   Allergic/Immunologic: Negative.   Neurological: Negative.   Hematological: Negative.   Psychiatric/Behavioral: Negative.   All other systems are negative.    TOBACCO USE:  Social History     Tobacco Use   Smoking Status Never Smoker   Smokeless Tobacco Never Used       VITALS:  Vitals:    10/02/19 1511   BP: 116/70   Patient Site: Left Arm   Patient Position: Sitting   Cuff Size: Adult Regular   Pulse: 72   Weight: 161 lb (73 kg)   Height: 5' 5.25\" (1.657 m)     Wt Readings from Last 3 Encounters:   10/02/19 161 lb (73 kg)   09/28/18 157 lb 1 oz (71.2 kg)   01/16/18 161 lb 2 oz (73.1 kg)       PHYSICAL EXAM:  Constitutional: Patient is oriented to person, place, and time. Patient appears well-developed and well-nourished. No distress.   Head: Normocephalic and atraumatic.   Right Ear: External ear normal.   Left Ear: External ear normal.   Nose: Nose normal.   Eyes: Conjunctivae and EOM are normal. Right eye exhibits no discharge. Left eye exhibits no discharge. No scleral icterus.   Neurological: Patient is alert and oriented to person, place, and time. No cranial nerve deficit. Coordination normal.   Skin: No rash noted. Patient is not diaphoretic. No erythema. No pallor.      ADDITIONAL HISTORY SUMMARIZED (2): Reviewed sleep medicine note from 9/24/14.  DECISION TO OBTAIN EXTRA INFORMATION (1): None.   RADIOLOGY TESTS (1): None.  LABS (1): " Reviewed lab from 7/3/19.  MEDICINE TESTS (1): None.  INDEPENDENT REVIEW (2 each): None.     IAmanda,  am scribing for and in the presence of, Dr. Manzo.    I, Dr. Manoz, personally performed the services described in this documentation, as scribed by Amanda Herron in my presence, and it is both accurate and complete.    MEDICATIONS:  Current Outpatient Medications   Medication Sig Dispense Refill     cetirizine (ZYRTEC) 10 MG tablet Take 10 mg by mouth daily.       ESTARYLLA 0.25-35 mg-mcg per tablet TAKE 1 TABLET BY MOUTH DAILY 28 tablet 0     levothyroxine (SYNTHROID, LEVOTHROID) 125 MCG tablet Take 1 tablet (125 mcg total) by mouth every morning. On empty stomach 90 tablet 3     spironolactone (ALDACTONE) 50 MG tablet Take 1 tablet (50 mg total) by mouth 2 (two) times a day. 180 tablet 3     doxepin 3 mg Tab Take 3 mg by mouth daily. 30 tablet 0     L norgest/e.estradiol-e.estrad (SEASONIQUE) 0.15 mg-30 mcg (84)/10 mcg (7) 3MPk Take 1 tablet by mouth daily. 1 Package 3     spironolactone (ALDACTONE) 50 MG tablet TAKE 1 TABLET(50 MG) BY MOUTH DAILY 90 tablet 1     No current facility-administered medications for this visit.        Total data points: 3

## 2021-06-02 VITALS — BODY MASS INDEX: 25.94 KG/M2 | WEIGHT: 157.06 LBS

## 2021-06-02 NOTE — TELEPHONE ENCOUNTER
Controlled Substance Refill Request  Medication:   Requested Prescriptions     Pending Prescriptions Disp Refills     eszopiclone (LUNESTA) 1 MG Tab [Pharmacy Med Name: ESZOPICLONE 1MG TABLETS] 30 tablet 0     Sig: TAKE 1 TABLET BY MOUTH DAILY IMMEDIATELY BEFORE BEDTIME     Date Last Fill: 10/3/19  [facility]   Submit electronically to pharmacy  Controlled Substance Agreement on File:   Encounter-Level CSA Scan Date:    There are no encounter-level csa scan date.       Last office visit: Last office visit pertaining to requested medication was 10/2/19.

## 2021-06-03 VITALS
BODY MASS INDEX: 26.82 KG/M2 | WEIGHT: 161 LBS | HEART RATE: 72 BPM | DIASTOLIC BLOOD PRESSURE: 70 MMHG | HEIGHT: 65 IN | SYSTOLIC BLOOD PRESSURE: 116 MMHG

## 2021-06-03 NOTE — TELEPHONE ENCOUNTER
Left patient a message to return phone call. Please inform patient her refill for Lunesta has been denied. Per Dr. Manzo, she will need to schedule an office visit prior to receiving refills. Thank you, Pura Hurtado

## 2021-06-03 NOTE — PROGRESS NOTES
PROGRESS NOTE   11/22/2019    SUBJECTIVE:  Keyla Ni is a 29 y.o. female  who presents for   Chief Complaint   Patient presents with     Medication Management     Med check     Patient comes in today for review her medications.  She overall is doing well.  I have not seen her in quite some time but she comes in today To catch me up.  He continues on thyroid medication which is working well.  She is taking the medication regularly.  She just recently had thyroid levels drawn and they were within normal limits.  She will continue on the same dose of thyroid medication.  When it has been a year since she had her last levels drawn then she done again and she understood that.  She is on spironolactone for acne.  She takes it once a day for the majority of the month but on the week that she has her.  She takes it twice a day.  She was off of it for a couple of months and noticed that her acne got a lot worse so she restarted it about 2 weeks ago.  She is hoping that her acne will get better now that she is back on it.  She seems to be tolerating that well.  We do need to electrolytes and make sure that that they are staying stable with the use of the spinal lactone.  She continues on Seasonique for her birth control and that seems to be working well.  She is bleeding at the appropriate times.  She does have a history of insomnia and has had a history of insomnia for quite some time.  She is currently using Lunesta although she is been off of that for 2 weeks because she could not get a refill.  It works well when she takes it.  She goes to sleep quite quickly and she does not use it she has a hard time falling asleep.  She is having no side effects to this medication.  Is been on Lunesta now for quite some time without problems.  She is wondering about getting a refill of that.  She does have a history of anxiety and depression but overall feels like that is doing well as well.  She is not currently on medication  for that and feels like things are doing fine.  She does not feel like she needs to start medication.  She is not suicidal or homicidal.  Please see PHQ 9 and CORNELIA which are both complete in their entirety today.    Patient Active Problem List   Diagnosis     Common Migraine (Without Aura)     Allergic Rhinitis     Insomnia     Hypothyroidism     Depression With Anxiety     Anemia     Acne       Current Outpatient Medications   Medication Sig Dispense Refill     cetirizine (ZYRTEC) 10 MG tablet Take 10 mg by mouth daily.       eszopiclone (LUNESTA) 1 MG Tab Take 1 tablet (1 mg total) by mouth daily. Take immediately before bedtime 30 tablet 0     L norgest/e.estradiol-e.estrad (SEASONIQUE) 0.15 mg-30 mcg (84)/10 mcg (7) 3MPk Take 1 tablet by mouth daily. 1 Package 3     levothyroxine (SYNTHROID, LEVOTHROID) 125 MCG tablet Take 1 tablet (125 mcg total) by mouth every morning. On empty stomach 90 tablet 3     spironolactone (ALDACTONE) 50 MG tablet Take 1 tablet (50 mg total) by mouth 2 (two) times a day. 180 tablet 3     No current facility-administered medications for this visit.        Allergies   Allergen Reactions     Cat Hair Std Allergenic Ext      Grass      House Dust      Mold Extracts      Pollen      Trees      Venom-Honey Bee Swelling       Past Medical History:   Diagnosis Date     Abnormal Pap smear of cervix 2014    nl since     Acne      Allergic rhinitis     on allergy shots per Dr Gamino     Anemia      Major depressive disorder, recurrent episode, unspecified      Unspecified hypothyroidism        Past Surgical History:   Procedure Laterality Date     WISDOM TOOTH EXTRACTION  2011       Social History     Tobacco Use   Smoking Status Never Smoker   Smokeless Tobacco Never Used       OBJECTIVE:     /69   Pulse 69   Wt 158 lb (71.7 kg)   LMP 11/06/2019 (Approximate)   SpO2 98%   Breastfeeding No   BMI 26.09 kg/m      Physical Exam:  GENERAL APPEARANCE: A&A, NAD, well hydrated, well  nourished  SKIN:  Normal skin turgor, no lesions/rashes.  Scatterred comedones are noted on her face but no evidence for any cystic acne is appreciated.  Neck was supple full range of motion no significant lymphadenopathy was noted.  No thyromegaly was appreciated.  CV: RRR, no M/G/R   LUNGS: CTAB  EXTREMITY: no swelling noted.  Full range of motion of all 4 extremities.   NEURO: no gross deficits   PSYCHIATRIC;  Mood appropriate, memory intact  A&O x3, thought processes congruent, non-tangential. No hallucinations/delusions. Insight/judgment: intact. Denies suicidal/homicidal ideations.      Little interest or pleasure in doing things: Not at all  Feeling down, depressed, or hopeless: Not at all  Trouble falling or staying asleep, or sleeping too much: Nearly every day  Feeling tired or having little energy: More than half the days  Poor appetite or overeating: Several days  Feeling bad about yourself - or that you are a failure or have let yourself or your family down: Several days  Trouble concentrating on things, such as reading the newspaper or watching television: Not at all  Moving or speaking so slowly that other people could have noticed. Or the opposite - being so fidgety or restless that you have been moving around a lot more than usual: Not at all  Thoughts that you would be better off dead, or of hurting yourself in some way: Not at all  PHQ-9 Total Score: 7  If you checked off any problems, how difficult have these problems made it for you to do your work, take care of things at home, or get along with other people?: Somewhat difficult    Total CORNELIA 7 Score       11/22/2019             CORNELIA 7 Total Score:  5          LABS:     Recent Results (from the past 240 hour(s))   Comprehensive Metabolic Panel   Result Value Ref Range    Sodium 141 136 - 145 mmol/L    Potassium 4.7 3.5 - 5.0 mmol/L    Chloride 108 (H) 98 - 107 mmol/L    CO2 24 22 - 31 mmol/L    Anion Gap, Calculation 9 5 - 18 mmol/L    Glucose 72  70 - 125 mg/dL    BUN 8 8 - 22 mg/dL    Creatinine 0.90 0.60 - 1.10 mg/dL    GFR MDRD Af Amer >60 >60 mL/min/1.73m2    GFR MDRD Non Af Amer >60 >60 mL/min/1.73m2    Bilirubin, Total 0.5 0.0 - 1.0 mg/dL    Calcium 9.5 8.5 - 10.5 mg/dL    Protein, Total 6.9 6.0 - 8.0 g/dL    Albumin 4.0 3.5 - 5.0 g/dL    Alkaline Phosphatase 33 (L) 45 - 120 U/L    AST 13 0 - 40 U/L    ALT 15 0 - 45 U/L       ASSESSMENT/PLAN:     Primary insomnia [F51.01]      1. Primary insomnia  - Ambulatory referral to Sleep Medicine  - eszopiclone (LUNESTA) 1 MG Tab; Take 1 tablet (1 mg total) by mouth daily. Take immediately before bedtime  Dispense: 30 tablet; Refill: 0    2. Acne vulgaris  - Comprehensive Metabolic Panel    3. Acquired hypothyroidism    4. Depression With Anxiety    Patient overall is doing okay.  She feels like things are doing fairly well.  She does have a history of thyroid problems and is on thyroid medication.  She feels like things are doing well with that and she just recently had a thyroid levels drawn.  When she needs a refill of her thyroid medication will certainly let me know.  She also has a history of depression and anxiety as previously been on medication but she is not currently on medication.  Again she overall feels like things are doing well in that department.  Please see PHQ 9 and CORNELIA which are both completed in their entirety today she is not suicidal or homicidal and does not feel like she needs any counseling or medication at this time.  She does have a history of acne for which she uses spironolactone.  She has enough spironolactone at home.  She uses Spironolactone once a day except for the week that she has her menstrual cycle and then she uses it twice a day.  We will go ahead and check a metabolic panel today to follow-up on that medication.  She just restarted it a couple of weeks ago after having been off of it for about 2 months.  In terms of her insomnia refill of her Lunesta.  We discussed the  fact that I am uncomfortable with being on Lunesta on a long-term basis.  She is been on this for quite some time and it has been helping her but I think she would be best to be seen by the sleep clinic.  She did see someone in 2014 which she did not particularly find helpful but would be willing to go back to see 70 else.  I did place referral to the sleep clinic today.  I told her that I would refill her Lunesta so that she has enough until she can get in to see the sleep clinic but I would rather have them evaluate her and see if there is other options available for her in terms of her sleep.  She is agreeable to that plan.  Prescription for Lunesta was sent to the pharmacy.  Elect light panel was drawn today.  We will contact her with results of that when it returns.  If things continue to go well we will see her back in about 6 months for follow-up.  She has additional problems or concerns prior to that should let me know.  Alisha Landrum MD

## 2021-06-04 VITALS
WEIGHT: 158 LBS | BODY MASS INDEX: 26.09 KG/M2 | DIASTOLIC BLOOD PRESSURE: 69 MMHG | HEART RATE: 69 BPM | SYSTOLIC BLOOD PRESSURE: 112 MMHG | OXYGEN SATURATION: 98 %

## 2021-06-04 VITALS
DIASTOLIC BLOOD PRESSURE: 73 MMHG | BODY MASS INDEX: 26.49 KG/M2 | WEIGHT: 159 LBS | OXYGEN SATURATION: 98 % | HEART RATE: 80 BPM | SYSTOLIC BLOOD PRESSURE: 123 MMHG | HEIGHT: 65 IN

## 2021-06-04 NOTE — TELEPHONE ENCOUNTER
Refill Approved    Rx renewed per Medication Renewal Policy. Medication was last renewed on 10/1/18    Jahaira Pérez, [facility] Triage/Med Refill 1/5/2020     Requested Prescriptions   Pending Prescriptions Disp Refills     levothyroxine (SYNTHROID, LEVOTHROID) 125 MCG tablet [Pharmacy Med Name: LEVOTHYROXINE 0.125MG (125MCG) TAB] 90 tablet 3     Sig: TAKE 1 TABLET BY MOUTH EVERY MORNING ON AN EMPTY STOMACH       Thyroid Hormones Protocol Passed - 1/3/2020 10:43 AM        Passed - Provider visit in past 12 months or next 3 months     Last office visit with prescriber/PCP: 9/28/2018 Gene Ruth MD OR same dept: 11/22/2019 Alisha Landrum MD OR same specialty: 11/22/2019 Alisha Landrum MD  Last physical: Visit date not found Last MTM visit: Visit date not found   Next visit within 3 mo: Visit date not found  Next physical within 3 mo: Visit date not found  Prescriber OR PCP: Gene Ruth MD  Last diagnosis associated with med order: 1. Hypothyroidism due to Hashimoto's thyroiditis  - levothyroxine (SYNTHROID, LEVOTHROID) 125 MCG tablet [Pharmacy Med Name: LEVOTHYROXINE 0.125MG (125MCG) TAB]; TAKE 1 TABLET BY MOUTH EVERY MORNING ON AN EMPTY STOMACH  Dispense: 90 tablet; Refill: 3    If protocol passes may refill for 12 months if within 3 months of last provider visit (or a total of 15 months).             Passed - TSH on file in past 12 months for patient age 12 & older     TSH   Date Value Ref Range Status   07/03/2019 1.86 0.30 - 5.00 uIU/mL Final

## 2021-06-04 NOTE — PATIENT INSTRUCTIONS - HE
What is a Home Sleep Study?    your doctor can give you a portable sleep monitor to use at home, so you don t have to spend the night in the sleep lab. But you should use a portable monitor only if:   ?Your doctor thinks you have a condition that makes you stop breathing for short periods while you are asleep, called  sleep apnea.    ?You do not have other serious medical problems, such as heart disease or lung disease.    Please bring the home sleep study device back to the sleep center as soon as you are finished with it so we can score it.     The cost of care estimate line is [phone]. They are able to give the patient an estimate of the charges and also an estimate of their insurance coverage/patient responsibility.   After your sleep study is performed, please call us at [phone] to schedule for a follow up to review the results of the sleep study.    Consider using one tab of low dose melatonin 3 mg or less on the night of the study.    It is completely voluntary.    Do not drive or operate machinery after intake of melatonin.     Bring sleep log on next clinical visit.

## 2021-06-04 NOTE — PROGRESS NOTES
Dear Dr. Landrum, Alisha Rosales Md  [address] [facility]  [facility]  [place] [address] [place] [id number]    Thank you for the opportunity to participate in the care of Ms. Keyla Ni.    Assessment and Plan:    In summary Keyla Ni is a 29 y.o. year old female here for evaluation of her sleep disturbance.  1.  Hypersomnia   MsRomario Ni has high risk for obstructive sleep apnea based on the history of hypersomnia, snoring and a crowded airway. I educated the patient on the underlying pathophysiology of obstructive sleep apnea. We reviewed the risks associated with sleep apnea, including increased cardiovascular risk and overall death. We talked about treatments briefly. I recommend getting a Home sleep study. The patient should return to the clinic to discuss results and treatment option in a patient-centered approach.  2.  Snoring  3.  Nonrestorative sleep  4.  Persistent Insomnia  We will renew the patient's Lunesta prescription for now.  5.  Circadian phase delay  We will need to have the patient fill out a 6-week sleep log documenting sleep-wake cycle.  Informed the patient that we may need to reinitiate melatonin therapy in future.    History of present illness:    She is a 29 y.o. female who comes to the clinic with a chief complaint of excessive daytime sleepiness and nonrestorative sleep that is been going on for more than 5 years.  While the patient denies any episodes of witnessed apnea she has been told that she does occasionally snore.  Patient also complains of frequent nocturnal awakenings as well as tossing and turning numerous times throughout the night.  She reports that her bed sheets will always look disheveled upon awakening.  She also admits that her job requires her to wake up much earlier than what she naturally likes.  She recently was started on Lunesta by her primary care provider which has improved her quality of sleep somewhat but she still feels tired throughout the day.  The  patient's review of systems otherwise negative.     Ideal Sleep-Wake Cycle(devoid of societal pressure):    Patient would try to initiate sleep at around midnight with a sleep latency of 15 minutes. The patient would have 4-5 awakenings. Final wake up time is around 7:30AM.    Patient told to return in one week after the sleep study is interpreted.    Past Medical History  Past Medical History:   Diagnosis Date     Abnormal Pap smear of cervix 2014    nl since     Acne      Allergic rhinitis     on allergy shots per Dr Stevenson Jenkins      Major depressive disorder, recurrent episode, unspecified      Unspecified hypothyroidism         Past Surgical History  Past Surgical History:   Procedure Laterality Date     WISDOM TOOTH EXTRACTION  2011        Meds  Current Outpatient Medications   Medication Sig Dispense Refill     cetirizine (ZYRTEC) 10 MG tablet Take 10 mg by mouth daily.       eszopiclone (LUNESTA) 1 MG Tab Take 1 tablet (1 mg total) by mouth daily. Take immediately before bedtime 30 tablet 0     L norgest/e.estradiol-e.estrad (SEASONIQUE) 0.15 mg-30 mcg (84)/10 mcg (7) 3MPk Take 1 tablet by mouth daily. 1 Package 3     levothyroxine (SYNTHROID, LEVOTHROID) 125 MCG tablet Take 1 tablet (125 mcg total) by mouth every morning. On empty stomach 90 tablet 3     spironolactone (ALDACTONE) 50 MG tablet Take 1 tablet (50 mg total) by mouth 2 (two) times a day. 180 tablet 3     No current facility-administered medications for this visit.         Allergies  Cat hair std allergenic ext; Grass; House dust; Mold extracts; Pollen; Trees; and Venom-honey bee     Social History  Social History     Socioeconomic History     Marital status: Single     Spouse name: Not on file     Number of children: 0     Years of education: Not on file     Highest education level: Not on file   Occupational History     Occupation: [employer]   Social Needs     Financial resource strain: Not on file     Food insecurity:     Worry: Not on file      Inability: Not on file     Transportation needs:     Medical: Not on file     Non-medical: Not on file   Tobacco Use     Smoking status: Never Smoker     Smokeless tobacco: Never Used   Substance and Sexual Activity     Alcohol use: Yes     Alcohol/week: 3.0 standard drinks     Types: 3 Standard drinks or equivalent per week     Drug use: No     Sexual activity: Yes     Partners: Male     Birth control/protection: Pill   Lifestyle     Physical activity:     Days per week: Not on file     Minutes per session: Not on file     Stress: Not on file   Relationships     Social connections:     Talks on phone: Not on file     Gets together: Not on file     Attends Evangelical service: Not on file     Active member of club or organization: Not on file     Attends meetings of clubs or organizations: Not on file     Relationship status: Not on file     Intimate partner violence:     Fear of current or ex partner: Not on file     Emotionally abused: Not on file     Physically abused: Not on file     Forced sexual activity: Not on file   Other Topics Concern     Not on file   Social History Narrative     Not on file        Family History  Family History   Problem Relation Age of Onset     Asthma Brother      Breast cancer Maternal Aunt         dx age 61     Depression Father      Snoring Father      Depression Mother      Uterine cancer Mother      Lung cancer Maternal Grandmother      Cancer Maternal Grandmother         bladder     Thyroid disease Paternal Grandmother      Osteoporosis Paternal Grandmother      Sleep apnea Sister      Patient's family history was not pertinent to chief complaint.     Review of Systems:  Constitutional: Negative except as noted in HPI.   Eyes: Negative except as noted in HPI.   ENT: Negative except as noted in HPI.   Cardiovascular: Negative except as noted in HPI.   Respiratory: Negative except as noted in HPI.   Gastrointestinal: Negative except as noted in HPI.   Genitourinary: Negative except  "as noted in HPI.   Musculoskeletal: Negative except as noted in HPI.   Integumentary: Negative except as noted in HPI.   Neurological: Negative except as noted in HPI.   Psychiatric: Negative except as noted in HPI.   Endocrine: Negative except as noted in HPI.   Hematologic/Lymphatic: Negative except as noted in HPI.      STOP BANG 12/6/2019   Do you snore loudly (louder than talking or loud enough to be heard through closed doors)? 0   Do you often feel tired, fatigued, or sleepy during daytime? 1   Has anyone observed you stop breathing in your sleep? 0   Do you have or are you being treated for high blood pressure? 0   BMI more than 35 kg/m2 0   Age over 50 years old? 0   Neck circumference greater than 16 inches? (No Data)   Gender male? 0     Epworths Sleepiness Scale 12/6/2019   Sitting and reading 0   Watching TV 2   Sitting, inactive in a public place (e.g. a theatre or a meeting) 0   As a passenger in a car for an hour without a break 0   Lying down to rest in the afternoon when circumstances permit 3   Sitting and talking to someone 0   Sitting quietly after a lunch without alcohol 0   In a car, while stopped for a few minutes in traffic 0   Total score 5     Rooming 12/6/2019   Usual bedtime 10pm   Sleep Latency 45 minutes   Awakenings 4-5 times   Wake Up Time 6am   Weekends varies   Energy Drinks very rarely   Coffee 0   Cola 1 per day    Difficulty falling asleep Yes   Difficulty staying asleep Yes   Excessive daytime tiredness Yes   Excessive daytime sleepiness Yes   Dozing off while driving No   Shift Worker Yes   Sleep Walking? No   Sleep Talking? Yes   Kicking or punching? No   Restless legs symptoms No       Physical Exam:  /73   Pulse 80   Ht 5' 5.25\" (1.657 m)   Wt 159 lb (72.1 kg)   LMP 11/06/2019 (Approximate)   SpO2 98%   BMI 26.26 kg/m    BMI:Body mass index is 26.26 kg/m .   GEN: NAD, appropriate for age  Head: Normocephalic.  EYES: PERRLA, EOMI  ENT: Oropharynx is clear, " "Piedra class 3+ airway. Uvula is intact  Nasal mucosa is moist without erythema  Neck : Thyroid is within normal limits. Neck Circ  13\"  CV: Regular rate and rhythm, S1 & S2 positive.  LUNGS: Bilateral breathsounds heard.   ABDOMEN: Positive bowel sounds in all quadrants, soft, no rebound or guarding  MUSCULOSKELETAL: No leg swelling  SKIN: warm, dry, no rashes  Neurological: Alert, oriented to time, place, and person.  Psych: normal mood, normal affect     Labs/Studies:     Lab Results   Component Value Date    WBC 4.3 04/28/2017    HGB 14.4 04/28/2017    HCT 43.1 04/28/2017    MCV 88 04/28/2017     04/28/2017         Chemistry        Component Value Date/Time     11/22/2019 1244    K 4.7 11/22/2019 1244     (H) 11/22/2019 1244    CO2 24 11/22/2019 1244    BUN 8 11/22/2019 1244    CREATININE 0.90 11/22/2019 1244    GLU 72 11/22/2019 1244        Component Value Date/Time    CALCIUM 9.5 11/22/2019 1244    ALKPHOS 33 (L) 11/22/2019 1244    AST 13 11/22/2019 1244    ALT 15 11/22/2019 1244    BILITOT 0.5 11/22/2019 1244            Lab Results   Component Value Date    FERRITIN 10 10/24/2014     Lab Results   Component Value Date    TSH 1.86 07/03/2019         Patient verbalized understanding of these issues, agrees with the plan and all questions were answered today. Patient was given an opportuntity to voice any other symptoms or concerns not listed above. Patient did not have any other symptoms or concerns.         Rafael Kraus DO  Board Certified in Internal Medicine and Sleep Medicine  [facility].    (Note created with Dragon voice recognition and unintended spelling errors and word substitutions may occur)     "

## 2021-06-05 VITALS
SYSTOLIC BLOOD PRESSURE: 115 MMHG | WEIGHT: 166 LBS | HEART RATE: 78 BPM | DIASTOLIC BLOOD PRESSURE: 60 MMHG | HEIGHT: 65 IN | BODY MASS INDEX: 27.66 KG/M2

## 2021-06-05 VITALS
OXYGEN SATURATION: 96 % | DIASTOLIC BLOOD PRESSURE: 64 MMHG | HEART RATE: 86 BPM | WEIGHT: 162 LBS | SYSTOLIC BLOOD PRESSURE: 122 MMHG | BODY MASS INDEX: 26.99 KG/M2 | HEIGHT: 65 IN

## 2021-06-05 VITALS
TEMPERATURE: 98.8 F | OXYGEN SATURATION: 98 % | DIASTOLIC BLOOD PRESSURE: 82 MMHG | HEART RATE: 74 BPM | BODY MASS INDEX: 28.49 KG/M2 | SYSTOLIC BLOOD PRESSURE: 114 MMHG | WEIGHT: 172.5 LBS

## 2021-06-05 VITALS
RESPIRATION RATE: 14 BRPM | OXYGEN SATURATION: 98 % | BODY MASS INDEX: 27.72 KG/M2 | TEMPERATURE: 98.1 F | SYSTOLIC BLOOD PRESSURE: 118 MMHG | DIASTOLIC BLOOD PRESSURE: 64 MMHG | HEART RATE: 84 BPM | HEIGHT: 65 IN | WEIGHT: 166.38 LBS

## 2021-06-05 VITALS — HEART RATE: 68 BPM | WEIGHT: 166 LBS | HEIGHT: 65 IN | BODY MASS INDEX: 27.66 KG/M2 | OXYGEN SATURATION: 98 %

## 2021-06-06 NOTE — TELEPHONE ENCOUNTER
Refill Approved    Rx renewed per Medication Renewal Policy. Medication was last renewed on 9/28/18.    Taryn Foy, [facility] Triage/Med Refill 2/14/2020     Requested Prescriptions   Pending Prescriptions Disp Refills     spironolactone (ALDACTONE) 50 MG tablet [Pharmacy Med Name: SPIRONOLACTONE 50MG TABLETS] 180 tablet 3     Sig: TAKE 1 TABLET(50 MG) BY MOUTH TWICE DAILY       Diuretics/Combination Diuretics Refill Protocol  Passed - 2/10/2020  8:57 PM        Passed - Visit with PCP or prescribing provider visit in past 12 months     Last office visit with prescriber/PCP: 9/28/2018 Gene Ruth MD OR same dept: 11/22/2019 Alisha Landrum MD OR same specialty: 11/22/2019 Alisha Landrum MD  Last physical: Visit date not found Last MTM visit: Visit date not found   Next visit within 3 mo: Visit date not found  Next physical within 3 mo: Visit date not found  Prescriber OR PCP: Gene Ruth MD  Last diagnosis associated with med order: 1. Acne vulgaris  - spironolactone (ALDACTONE) 50 MG tablet [Pharmacy Med Name: SPIRONOLACTONE 50MG TABLETS]; TAKE 1 TABLET(50 MG) BY MOUTH TWICE DAILY  Dispense: 180 tablet; Refill: 3    If protocol passes may refill for 12 months if within 3 months of last provider visit (or a total of 15 months).             Passed - Serum Potassium in past 12 months      Lab Results   Component Value Date    Potassium 4.7 11/22/2019             Passed - Serum Sodium in past 12 months      Lab Results   Component Value Date    Sodium 141 11/22/2019             Passed - Blood pressure on file in past 12 months     BP Readings from Last 1 Encounters:   12/06/19 123/73             Passed - Serum Creatinine in past 12 months      Creatinine   Date Value Ref Range Status   11/22/2019 0.90 0.60 - 1.10 mg/dL Final

## 2021-06-08 NOTE — PROGRESS NOTES
SUBJECTIVE: Keyla Ni is a 26 y.o.  female who presents today with a complaint of sore throat that's been on and off for the last 3 weeks.  This morning she had a worsening of it and she noticed that she was having pain in her shoulder and neck and the last time this happened she had a positive strep test so she thought she would come in to get that checked out.  She has been having some facial pressure and headache and postnasal drip over this time.  She denies fever or cough.  She also has a history of hypothyroidism and needs a thyroid check as she has had a change in her thyroid dose with her endocrinologist.  There are future orders written in her chart.  She also is on spironolactone for acne but has not had electrolytes checked.  She is willing to do this lab work today.  She tells me she had her flu shot at her work which is [employer] right around Elvira time.    OBJECTIVE:   Visit Vitals     /70     Pulse 84     Temp 98.1  F (36.7  C)     Wt 169 lb 11.2 oz (77 kg)     BMI 27.81 kg/m2     General: Healthy-appearing young female in no acute distress  HEENT: Eyes clear, nose without rhinorrhea, throat mildly erythematous and irritated looking but no lesions or exudates, she is tender to palpation of the maxillary sinuses, ears are clear  Heart: Regular rate and rhythm without murmur  Lungs: Clear bilaterally  Abdomen: Soft    Rapid strep was negative.    ASSESSMENT & PLAN:    1. Sore throat  Rapid Strep A Screen-Throat    Group A Strep, RNA Direct Detection, Throat   2. Acute sinusitis     3. Hypothyroidism     4. Acne vulgaris  Basic Metabolic Panel     We will have her thyroid labs and a basic metabolic panel drawn today and I will get back to her via my chart and only call with grossly abnormal values.  We discussed treatment of sinusitis.  At this point she does not want an antibiotic as she thinks she is improving.  If she changes her mind she will give me a call.  She can  follow-up at her usual interval.    Patient Active Problem List   Diagnosis     Common Migraine (Without Aura)     Fatigue     Allergy To Insect Bites / Stings     Allergic Rhinitis Due To Pollen     Allergic Rhinitis Due To Animals     Allergic Rhinitis Due To Mold     Allergic Rhinitis     Insomnia     Hypothyroidism     Anxiety     Depression With Anxiety     Anemia     Headache(784.0)     Acne       Current Outpatient Prescriptions on File Prior to Visit   Medication Sig Dispense Refill     cetirizine-pseudoephedrine (ZYRTEC-D) 5-120 mg per tablet Take 1 tablet by mouth daily.       EPINEPHrine (EPIPEN 2-KELI) 0.3 mg/0.3 mL (1:1,000) atIn INJECT INTRAMUSCULARLY AS DIRECTED.       levothyroxine (SYNTHROID, LEVOTHROID) 137 MCG tablet Take 1 tablet (137 mcg total) by mouth daily. 90 tablet 0     norgestimate-ethinyl estradiol (SPRINTEC, 28,) 0.25-35 mg-mcg per tablet Take 1 tablet by mouth daily. 28 tablet 11     spironolactone (ALDACTONE) 50 MG tablet Take 1 tablet (50 mg total) by mouth daily. 90 tablet 3     benzoyl peroxide-erythromycin (BENZAMYCIN) gel Apply to affected area 2 times daily 47 g 0     ferrous sulfate 65 mg elemental iron (IRON) Take 1 tablet by mouth daily with breakfast.       hydrocortisone (WESTCORT) 0.2 % cream Apply topically 2 (two) times a day. 45 g 0     No current facility-administered medications on file prior to visit.

## 2021-06-10 NOTE — PROGRESS NOTES
Keyla Ni is a 26 y.o. here for a Pap smear and physical exam. Patient is overall doing well. She is fasting today.     Health Maintenance: Her last pap smear was in October 2014 and returned normal. She has a history of one abnormal pap smear in 2013. All medical history, surgical history, family history, allergies, and medications are updated today.     She does have a history of acne is recently been put on spironolactone.  That seems to be working very well for her.  She would like a refill of that we also need to draw labs to follow-up make sure electro lites are okay given the long-term spironolactone use.  She also has a history of hypothyroidism and is currently on levothyroxine.  She just recently had her thyroid levels drawn and therefore will not repeat those today.  She continues on her birth control pills which are working well for her.  She is bleeding when she supposed to not when she is not.  She does continue to have some esophageal reflux symptoms but they seem to be well controlled with Tums.  If this becomes more of an issue she certainly will let me know.  She did have a lot of issues prior to now but it seems like things of kind of settle down.  She also continues on Zyrtec-D for her allergies which again seems to be working fine.  She has had a history of anemia in the past and therefore will do CBC today to make sure her hemoglobin is normal.    Healthy Habits:   Regular Exercise: No  Sunscreen Use: Yes  Healthy Diet: Yes  Dental Visits Regularly: Yes  Seat Belt: Yes  Sexually active: Yes  Self Breast Exam Monthly:No  Hemoccults: No  Flex Sig: No  Colonoscopy: No  Lipid Profile: Yes  Glucose Screen: Yes  Prevention of Osteoporosis: No  Last Dexa: No  Guns at Home:  No  Domestic Violence:  No    Current Outpatient Medications Include:    Current Outpatient Prescriptions:      cetirizine-pseudoephedrine (ZYRTEC-D) 5-120 mg per tablet, Take 1 tablet by mouth daily., Disp: , Rfl:       levothyroxine (SYNTHROID, LEVOTHROID) 137 MCG tablet, Take 1 tablet (137 mcg total) by mouth daily., Disp: 90 tablet, Rfl: 0     norgestimate-ethinyl estradiol (SPRINTEC, 28,) 0.25-35 mg-mcg per tablet, Take 1 tablet by mouth daily., Disp: 3 Package, Rfl: 0     spironolactone (ALDACTONE) 50 MG tablet, Take 1 tablet (50 mg total) by mouth daily., Disp: 90 tablet, Rfl: 1    Allergies:    Allergies   Allergen Reactions     Cat Hair Std Allergenic Ext      Grass      House Dust      Mold Extracts      Pollen      Trees      Venom-Honey Bee Swelling       Past Medical History:   Diagnosis Date     Abnormal Pap smear of cervix 2014    nl since     Acne      Allergic rhinitis     on allergy shots per Dr Gamino     Anemia      Major depressive disorder, recurrent episode, unspecified      Unspecified hypothyroidism        Past Surgical History:   Procedure Laterality Date     WISDOM TOOTH EXTRACTION  2011       Immunization History   Administered Date(s) Administered     DTaP, historic 07/11/1995     HPV Quadrivalent 02/17/2009, 05/04/2009, 10/30/2009     Hep A, historic 03/03/2010, 07/28/2011     Hep B, historic 09/20/2001, 11/15/2001, 03/14/2002     IPV 07/11/1995     Influenza, inj, historic 10/01/2013, 09/18/2015, 12/20/2016     MMR 10/24/1991, 02/14/1997     Tdap 04/24/2007, 05/29/2012       Family History   Problem Relation Age of Onset     Asthma Brother      Breast cancer Maternal Aunt      dx age 61     Depression Father      Depression Mother      Uterine cancer Mother      Lung cancer Maternal Grandmother      Thyroid disease Paternal Grandmother      Osteoporosis Paternal Grandmother        Social History     Social History     Marital status: Single     Spouse name: N/A     Number of children: 0     Years of education: N/A     Occupational History     CMA      Social History Main Topics     Smoking status: Never Smoker     Smokeless tobacco: Never Used     Alcohol use 1.8 oz/week     3 Standard drinks or  equivalent per week     Drug use: No     Sexual activity: Yes     Partners: Male     Birth control/ protection: Pill     Other Topics Concern     Not on file     Social History Narrative   She is working as a medical assistant at [employer].     Last cholesterol:   Lab Results   Component Value Date    CHOL 153 04/28/2017    CHOL 118 10/22/2014    CHOL 104 05/18/2012     Lab Results   Component Value Date    HDL 48 (L) 04/28/2017    HDL 42 10/22/2014    HDL 40 05/18/2012     Lab Results   Component Value Date    LDLCALC 67 04/28/2017    LDLCALC 64 10/22/2014    LDLCALC 54.0 05/18/2012     Lab Results   Component Value Date    TRIG 192 (H) 04/28/2017    TRIG 60 10/22/2014    TRIG 50 05/18/2012       Last mammogram: never    Birth Control Method: OCP  High Risk/Behavior: none    PREVENTATIVE SERVICES  Preventative services were reviewed today, 4/28/2017    LMP: Patient's last menstrual period was 04/16/2017.  Menstrual Regularity: monthly  Flow: normal    REVIEW OF SYSTEMS:  Denies fever, chills, visual changes, fatigue, myalgias, nasal congestion, rhinorrhea, ear pain or discharge, sore throat, swollen glands, breast mass, nipple discharge, breast changes, abdominal pain, vomiting, diarrhea, constipation, cough, shortness of breath, chest pain, weight change, change in bowel habits, melena, rectal bleeding, dysuria, frequency, urgency, hematuria, polyuria, polydipsia, polyphagia, joint pain or swelling or erythema, edema, rash, weakness, paresthesias, vaginal discharge or bleeding or mood changes.  She ran out of spironolactone and has not been taking it recently. She takes this normally for acne and reports it works well. She has allergies to anything airborne. She carries an EpiPen with her for her bee sting allergy and needs a prescription for a new one. She has never needed to use this. She continues taking Sprintec oral contraceptive daily and recently got a refill of this. She continues taking 137 mcg of  "levothyroxine daily. She had an episode of nausea three days ago, but regularly does not. Her reflux occasionally gives her chest discomfort, but this resolves with Tums. Remainder of review of systems was negative.      PHYSICAL EXAM:  On exam, patient is a WD, WN 26 y.o. female in NAD.  /70 (Patient Site: Left Arm, Patient Position: Sitting, Cuff Size: Adult Regular)  Pulse 70 Comment: REGULAR  Temp 98.4  F (36.9  C) (Oral)   Resp 14 Comment: regular  Ht 5' 5.25\" (1.657 m)  Wt 167 lb 8 oz (76 kg)  LMP 04/16/2017  Breastfeeding? No  BMI 27.66 kg/m2  Head normocephalic, atraumatic.  Eyes PERRL, ears TM s clear bilaterally.  Throat without significant erythemia or exudate.  Neck was supple, full range of motion. No significant lymphadenopathy or thyromegaly was appreciated.  Lungs clear to auscultation  Heart regular rate and rhythm.  Breast exam was done. No masses were appreciated. Axilla were clear bilaterally. No nipple discharge was appreciated. Self breast exam was reviewed and taught today.  Abdomen was soft, nontender, nondistended. No masses or organomegaly were palpated. Positive bowel sounds were appreciated.  Extremities with full range of motion of all 4 extremities were noted.  Deep tendon reflexes were equal and symmetrical. Motor and sensation were intact to both the upper and lower extremities.  Cranial nerves 2 through 12 were grossly intact.  EOM were intact.  Pelvic exam was done. External genitalia appeared normal. Speculum was introduced and the Pap smear obtained.  She did have some bloody mucousy discharge coming from the cervical loss which was removed with a ring forceps without difficulty or pain.  Bimanual exam revealed uterus to be normal size and adenexa without palpable masses.     Recent Results (from the past 240 hour(s))   Urinalysis Macroscopic   Result Value Ref Range    Color, UA Yellow Colorless, Yellow, Straw, Light Yellow    Clarity, UA Clear Clear    Glucose, UA " Negative Negative    Bilirubin, UA Negative Negative    Ketones, UA Negative Negative    Specific Gravity, UA <=1.005 1.005 - 1.030    Blood, UA Small (!) Negative    pH, UA 6.0 5.0 - 8.0    Protein, UA Negative Negative mg/dL    Urobilinogen, UA 0.2 E.U./dL 0.2 E.U./dL, 1.0 E.U./dL    Nitrite, UA Negative Negative    Leukocytes, UA Negative Negative   Lipid Cascade   Result Value Ref Range    Cholesterol 153 <=199 mg/dL    Triglycerides 192 (H) <=149 mg/dL    HDL Cholesterol 48 (L) >=50 mg/dL    LDL Calculated 67 <=129 mg/dL    Patient Fasting > 8hrs? Yes    Comprehensive Metabolic Panel   Result Value Ref Range    Sodium 140 136 - 145 mmol/L    Potassium 3.9 3.5 - 5.0 mmol/L    Chloride 105 98 - 107 mmol/L    CO2 24 22 - 31 mmol/L    Anion Gap, Calculation 11 5 - 18 mmol/L    Glucose 79 70 - 125 mg/dL    BUN 8 8 - 22 mg/dL    Creatinine 0.80 0.60 - 1.10 mg/dL    GFR MDRD Af Amer >60 >60 mL/min/1.73m2    GFR MDRD Non Af Amer >60 >60 mL/min/1.73m2    Bilirubin, Total 0.4 0.0 - 1.0 mg/dL    Calcium 9.2 8.5 - 10.5 mg/dL    Protein, Total 6.8 6.0 - 8.0 g/dL    Albumin 3.6 3.5 - 5.0 g/dL    Alkaline Phosphatase 37 (L) 45 - 120 U/L    AST 17 0 - 40 U/L    ALT 24 0 - 45 U/L   HM2(CBC w/o Differential)   Result Value Ref Range    WBC 4.3 4.0 - 11.0 thou/uL    RBC 4.92 3.80 - 5.40 mill/uL    Hemoglobin 14.4 12.0 - 16.0 g/dL    Hematocrit 43.1 35.0 - 47.0 %    MCV 88 80 - 100 fL    MCH 29.2 27.0 - 34.0 pg    MCHC 33.3 32.0 - 36.0 g/dL    RDW 10.8 (L) 11.0 - 14.5 %    Platelets 214 140 - 440 thou/uL    MPV 7.6 7.0 - 10.0 fL   Urine,Microscopic   Result Value Ref Range    Bacteria, UA Few (!) None Seen hpf    RBC, UA 0-2 None Seen, 0-2 hpf    WBC, UA 0-5 None Seen, 0-5 hpf    Squam Epithel, UA 0-5 None Seen, 0-5 lpf       ASSESSMENT: 26 y.o. female physical exam and pap smear.    PLAN:   Routine general medical examination at a health care facility [Z00.00]    1. Routine general medical examination at a health care facility  -  Urinalysis Macroscopic  - Lipid Cascade  - HM2(CBC w/o Differential)  - Gynecologic Cytology (PAP Smear)  - Urine,Microscopic    2. Acne  - spironolactone (ALDACTONE) 50 MG tablet; Take 1 tablet (50 mg total) by mouth daily.  Dispense: 90 tablet; Refill: 1  - Comprehensive Metabolic Panel    3. Allergic reaction  - EPINEPHrine (EPIPEN 2-KELI) 0.3 mg/0.3 mL atIn; Inject 0.3 mL (0.3 mg total) into the shoulder, thigh, or buttocks once for 1 dose. INJECT INTRAMUSCULARLY AS DIRECTED.  Dispense: 1 Pre-filled Pen Syringe; Refill: 1    4. Anemia  - HM2(CBC w/o Differential)      Patient is a 26 y.o. female who is overall doing well.  I did refill her spironolactone which seems to working well for her acne.  Electrolytes were drawn today to follow-up on that and will contact her with results of those when they return.  She was also given a new prescription for EpiPen as her EpiPen had  a year ago.  CBC was drawn to follow-up on her history of anemia.  She overall feels like things are doing well will call with additional questions or concerns.  All of her questions and concerns were addressed today.  If she has additional problems or concerns should let me know.    Will contact her with the results of the labs when available.    Alisha Landrum M.D.       ADDITIONAL HISTORY SUMMARIZED (2): None.  DECISION TO OBTAIN EXTRA INFORMATION (1): None.   RADIOLOGY TESTS (1): None.  LABS (1): Reviewed labs and ordered lab.  MEDICINE TESTS (1): None.  INDEPENDENT REVIEW (2 each): None.     The visit lasted a total of 23 minutes face to face with the patient. Over 50% of the time was spent counseling and educating the patient about annual physical exam.    I, Lucina Lu, am scribing for and in the presence of, Dr. Landrum.    I, Dr. Landrum, personally performed the services described in this documentation, as scribed by Lucina Lu in my presence, and it is both accurate and complete.    Total data  points: 1

## 2021-06-11 NOTE — PROGRESS NOTES
PROGRESS NOTE   9/18/2020    SUBJECTIVE:  Keyla Ni is a 30 y.o. female  who presents for   Chief Complaint   Patient presents with     Follow-up     Fatigued -  check thyroid, allergies     Patient comes in today for follow-up of her thyroid levels.  She has a history of hypothyroidism is currently on thyroid medication.  She notes that she has been very tired recently for about the last 2 months or so and she is wondering if her thyroid level could be off.  Her thyroid has not been checked for over a year and so certainly that is due to be checked.  She notes that she does get decent sleep although she feels drained by 9:00 in the morning even if she has slept well.  She has a history of primary insomnia and has been working with the sleep doctor for insomnia.  She feels like overall she is doing okay.  She was supposed to get a sleep study but never did do it go ahead and do that.  She saw the sleep doctor in December.  She gets about 4 to 4-1/2 hours of solid sleep which is much better than what she was getting in the past.  She decided to not get the sleep study due to the fact that she had a high deductible and is wondering if maybe she should get it after the first of the year when her new deductible starts over again.  She has not been using Lunesta to help her sleep either and she seems to again overall be doing okay.  She does have a history of depression and anxiety and that seems to be relatively stable.  She does not feel like she is incredibly stressed or having other issues emotionally that could be contributing to the fatigue.  She is functional and she is doing what she needs to do and she feels like her depression is manageable.  She currently has cold type symptoms and that certainly is not helping with her fatigue.  She was tested for COVID on Tuesday of this past week and was negative and so she thinks that this is probably just cold symptoms and actually it may be getting a little bit  better even.  She will get a flu vaccination at work as she works at [employer].  She is wondering if she can get some Astelin for her seasonal allergies.  She has been using Flonase but that does not seem like it helps and she is wondering if I would be willing to give her a prescription for Astelin to see if that would help with her allergy symptoms.    Patient Active Problem List   Diagnosis     Common Migraine (Without Aura)     Allergic Rhinitis     Insomnia     Hypothyroidism     Depression With Anxiety     Anemia     Acne       Current Outpatient Medications   Medication Sig Dispense Refill     cetirizine (ZYRTEC) 10 MG tablet Take 10 mg by mouth daily.       famotidine (PEPCID) 20 MG tablet Take 20 mg by mouth 2 (two) times a day.       L norgest/e.estradiol-e.estrad (SEASONIQUE) 0.15 mg-30 mcg (84)/10 mcg (7) 3MPk Take 1 tablet by mouth daily. 1 Package 3     levothyroxine (SYNTHROID, LEVOTHROID) 125 MCG tablet TAKE 1 TABLET BY MOUTH EVERY MORNING ON AN EMPTY STOMACH 90 tablet 1     spironolactone (ALDACTONE) 50 MG tablet TAKE 1 TABLET(50 MG) BY MOUTH TWICE DAILY 180 tablet 3     azelastine (ASTELIN) 137 mcg (0.1 %) nasal spray 1 spray into each nostril 2 (two) times a day. 30 mL 0     No current facility-administered medications for this visit.        Allergies   Allergen Reactions     Cat Hair Std Allergenic Ext      Grass      House Dust      Mold Extracts      Pollen      Trees      Venom-Honey Bee Swelling       Past Medical History:   Diagnosis Date     Abnormal Pap smear of cervix 2014    nl since     Acne      Allergic rhinitis     on allergy shots per Dr Gamino     Anemia      Major depressive disorder, recurrent episode, unspecified      Unspecified hypothyroidism        Past Surgical History:   Procedure Laterality Date     WISDOM TOOTH EXTRACTION  2011       Social History     Tobacco Use   Smoking Status Never Smoker   Smokeless Tobacco Never Used       OBJECTIVE:     /64   Pulse 84    "Temp 98.1  F (36.7  C)   Resp 14   Ht 5' 5.25\" (1.657 m)   Wt 166 lb 6 oz (75.5 kg)   SpO2 98%   Breastfeeding No   BMI 27.47 kg/m      Physical Exam:  GENERAL APPEARANCE: A&A, NAD, well hydrated, well nourished  SKIN:  Normal skin turgor, no lesions/rashes   HEENT: moist mucous membranes, no rhinorrhea, PERRLA, TM's clear bilaterally, Throat without significant erythema or exudate.  NECK: Supple, full ROM, no significant lymphadenopathy or thyromegaly  CV: RRR, no M/G/R   LUNGS: CTAB  EXTREMITY: no swelling noted.  Full range of motion of all 4 extremities.   NEURO: no gross deficits   PSYCHIATRIC;  Mood appropriate, memory intact  A&O x3, thought processes congruent, non-tangential. No hallucinations/delusions. Insight/judgment: intact. Denies suicidal/homicidal ideations.    Little interest or pleasure in doing things: Not at all  Feeling down, depressed, or hopeless: Not at all  Trouble falling or staying asleep, or sleeping too much: More than half the days  Feeling tired or having little energy: Nearly every day  Poor appetite or overeating: Several days  Feeling bad about yourself - or that you are a failure or have let yourself or your family down: Not at all  Trouble concentrating on things, such as reading the newspaper or watching television: Not at all  Moving or speaking so slowly that other people could have noticed. Or the opposite - being so fidgety or restless that you have been moving around a lot more than usual: Not at all  Thoughts that you would be better off dead, or of hurting yourself in some way: Not at all  PHQ-9 Total Score: 6  If you checked off any problems, how difficult have these problems made it for you to do your work, take care of things at home, or get along with other people?: Not difficult at all    Total CORNELIA 7 Score       9/18/2020             CORNELIA 7 Total Score:  5          LABS:     Recent Results (from the past 240 hour(s))   Thyroid Stimulating Hormone (TSH)   Result " Value Ref Range    TSH 3.39 0.30 - 5.00 uIU/mL   T4, Free   Result Value Ref Range    Free T4 1.0 0.7 - 1.8 ng/dL   HM2(CBC w/o Differential)   Result Value Ref Range    WBC 4.6 4.0 - 11.0 thou/uL    RBC 4.96 3.80 - 5.40 mill/uL    Hemoglobin 14.9 12.0 - 16.0 g/dL    Hematocrit 43.4 35.0 - 47.0 %    MCV 88 80 - 100 fL    MCH 30.1 27.0 - 34.0 pg    MCHC 34.4 32.0 - 36.0 g/dL    RDW 11.1 11.0 - 14.5 %    Platelets 230 140 - 440 thou/uL    MPV 7.6 7.0 - 10.0 fL   Comprehensive Metabolic Panel   Result Value Ref Range    Sodium 140 136 - 145 mmol/L    Potassium 4.4 3.5 - 5.0 mmol/L    Chloride 105 98 - 107 mmol/L    CO2 25 22 - 31 mmol/L    Anion Gap, Calculation 10 5 - 18 mmol/L    Glucose 95 70 - 125 mg/dL    BUN 9 8 - 22 mg/dL    Creatinine 0.90 0.60 - 1.10 mg/dL    GFR MDRD Af Amer >60 >60 mL/min/1.73m2    GFR MDRD Non Af Amer >60 >60 mL/min/1.73m2    Bilirubin, Total 0.3 0.0 - 1.0 mg/dL    Calcium 9.5 8.5 - 10.5 mg/dL    Protein, Total 6.8 6.0 - 8.0 g/dL    Albumin 3.8 3.5 - 5.0 g/dL    Alkaline Phosphatase 36 (L) 45 - 120 U/L    AST 12 0 - 40 U/L    ALT 18 0 - 45 U/L       ASSESSMENT/PLAN:     Fatigue, unspecified type [R53.83]      1. Fatigue, unspecified type  - Thyroid Stimulating Hormone (TSH)  - HM2(CBC w/o Differential)  - Comprehensive Metabolic Panel    2. Acquired hypothyroidism  - Thyroid Stimulating Hormone (TSH)  - T4, Free    3. Depression With Anxiety    4. Primary insomnia    5. Anemia, unspecified type  - HM2(CBC w/o Differential)    6. Seasonal allergic rhinitis, unspecified trigger  - azelastine (ASTELIN) 137 mcg (0.1 %) nasal spray; 1 spray into each nostril 2 (two) times a day.  Dispense: 30 mL; Refill: 0    Patient overall seems to be doing okay.  She does have increased fatigue over the last 2 months or so.  Certainly she has a history of anxiety and depression and seems to be doing okay in regards to that but that could be contributing to her fatigue especially given the fact that the  pandemic is here and everyone is under more stress.  I do think it would be reasonable to recheck thyroid levels that she has had a history of hypothyroidism and has not had her thyroid levels drawn in quite some time.  We will go ahead and draw a TSH as well as a free T4 to follow-up on her hypothyroidism.  We will also do a CBC and a metabolic panel to follow-up on the fatigue that she has been experiencing.  She has been known to have anemia in the past and so CBC was ordered for that regard as well.  She is feels like overall she is sleeping fairly well.  She plans to follow-up with the sleep doctor after the first of the year when her new deductible starts over.  Certainly I will contact her with results of the lab work when it returns.  She feels like she is able to function and do what she needs to do.  She of course is busy trying to work and do other things within her life but feels like her fatigue is worse than it should be.  If the lab work is all normal she will continue to monitor her symptoms currently.  She does not feel like she is getting a whole lot of relief from the Flonase that she uses for allergies and so we will go ahead and try some Astelin and see if that will be helpful.  Prescription for that was sent to the pharmacy today.  If she has additional questions or concerns will let me know.  We otherwise should see her in about 6 months for follow-up of her hypothyroidism.  All of her questions were answered today.  Alisha Landrum MD

## 2021-06-12 NOTE — PROGRESS NOTES
ASSESSMENT/PLAN:  Keyla was seen today for anxiety.    Diagnoses and all orders for this visit:    Chronic fatigue  29 yo female with hypothyroidism, daytime sleepiness, and nonrestorative sleep presents for chronic fatigue  Was seen by PCP recently with unremarkable labs, including TSH & T4.  I explained that although her thyroid labs may be in range, she is still symptomatic and may need to optimize her thyroid medication for the treatment of fatigue, depression, and anxiety and improve overall welness  Will ask her to finish her current RX of levothyroxine then discontinue  Will incorporate dessicated thyroid for both T4 & T3 effects.  Update in 2-4 wks  Recommend repeat labs in 3 months  Medications themselves are not enough and that she will continue to work on self care and healthy lifestyle.  Motivational interviewing was utilized today.  Modified cognitive behavioral therapy was performed with counseling on internal locus of control.  Discussed importance of self care, sleep, nutrition, hydration, exercise, and mindfulness     -     thyroid, pork, 60 mg Tab; Take 1 tablet (60 mg total) by mouth daily. On an empty stomach with small sip of water at least 30 min prior to meal    SUBJECTIVE:    Keyla Ni is a 30 y.o. female who came in today     Has been feeling anxious, irritable, down, fatigue for the last few months  Saw her PCP a month ago and had labs drawn (CMP, CBC, TSH, T4) without change in meds or additional meds  At present, she takes levothyroxine 125mcg, spironolactone 50mg, and birth control pills  Saw sleep clinic 6 months for excessive daytime sleepiness and nonrestorative sleep. Notes were available for review.  There was discussion about sleep study, lunesta, but not sure where she is on this  PHQ9=10, GAD7= 11    Review of Systems (except those mentioned above)  Constitutional: Negative.   HENT: Negative.   Eyes: Negative.   Respiratory: Negative.   Cardiovascular: Negative.    Gastrointestinal: Negative.   Endocrine: Negative.   Genitourinary: Negative.   Musculoskeletal: Negative.   Skin: Negative.   Allergic/Immunologic: Negative.   Neurological: Negative.   Hematological: Negative.   Psychiatric/Behavioral: Negative.     Patient Active Problem List    Diagnosis Date Noted     Acne 01/16/2017     Anemia 01/05/2015     Depression With Anxiety      Common Migraine (Without Aura)      Allergic Rhinitis      Insomnia      Hypothyroidism      Allergies   Allergen Reactions     Cat Hair Std Allergenic Ext      Grass      House Dust      Mold Extracts      Pollen      Trees      Venom-Honey Bee Swelling     Current Outpatient Medications   Medication Sig Dispense Refill     azelastine (ASTELIN) 137 mcg (0.1 %) nasal spray 1 spray into each nostril 2 (two) times a day. 30 mL 0     cetirizine (ZYRTEC) 10 MG tablet Take 10 mg by mouth daily.       famotidine (PEPCID) 20 MG tablet Take 20 mg by mouth 2 (two) times a day.       L norgest/e.estradioL-e.estrad (SEASONIQUE) 0.15 mg-30 mcg (84)/10 mcg (7) 3MPk Take 1 tablet by mouth daily. 1 Package 0     levothyroxine (SYNTHROID, LEVOTHROID) 125 MCG tablet TAKE 1 TABLET BY MOUTH EVERY MORNING ON AN EMPTY STOMACH 90 tablet 1     spironolactone (ALDACTONE) 50 MG tablet TAKE 1 TABLET(50 MG) BY MOUTH TWICE DAILY 180 tablet 3     thyroid, pork, 60 mg Tab Take 1 tablet (60 mg total) by mouth daily. On an empty stomach with small sip of water at least 30 min prior to meal 90 tablet 0     No current facility-administered medications for this visit.      Past Medical History:   Diagnosis Date     Abnormal Pap smear of cervix 2014    nl since     Acne      Allergic rhinitis     on allergy shots per Dr Gamino     Anemia      Major depressive disorder, recurrent episode, unspecified      Unspecified hypothyroidism      Past Surgical History:   Procedure Laterality Date     WISDOM TOOTH EXTRACTION  2011     Social History     Socioeconomic History     Marital  status: Single     Spouse name: None     Number of children: 0     Years of education: None     Highest education level: None   Occupational History     Occupation: CMA   Social Needs     Financial resource strain: None     Food insecurity     Worry: None     Inability: None     Transportation needs     Medical: None     Non-medical: None   Tobacco Use     Smoking status: Never Smoker     Smokeless tobacco: Never Used   Substance and Sexual Activity     Alcohol use: Yes     Alcohol/week: 3.0 standard drinks     Types: 3 Standard drinks or equivalent per week     Drug use: No     Sexual activity: Yes     Partners: Male     Birth control/protection: Pill   Lifestyle     Physical activity     Days per week: None     Minutes per session: None     Stress: None   Relationships     Social connections     Talks on phone: None     Gets together: None     Attends Muslim service: None     Active member of club or organization: None     Attends meetings of clubs or organizations: None     Relationship status: None     Intimate partner violence     Fear of current or ex partner: None     Emotionally abused: None     Physically abused: None     Forced sexual activity: None   Other Topics Concern     None   Social History Narrative     None     Family History   Problem Relation Age of Onset     Asthma Brother      Breast cancer Maternal Aunt         dx age 61     Depression Father      Snoring Father      Depression Mother      Uterine cancer Mother      Lung cancer Maternal Grandmother      Cancer Maternal Grandmother         bladder     Thyroid disease Paternal Grandmother      Osteoporosis Paternal Grandmother      Sleep apnea Sister          OBJECTIVE:    Vitals:    11/05/20 1007   BP: 114/82   Patient Site: Left Arm   Patient Position: Sitting   Cuff Size: Adult Regular   Pulse: 74   Temp: 98.8  F (37.1  C)   TempSrc: Oral   SpO2: 98%   Weight: 172 lb 8 oz (78.2 kg)     Body mass index is 28.49 kg/m .    Physical  Exam:  Constitutional: Patient is oriented to person, place, and time. Patient appears well-developed and well-nourished. No distress.   Head: Normocephalic and atraumatic.   Right Ear: External ear normal.   Left Ear: External ear normal.   Nose: Nose normal.   Eyes: Conjunctivae and EOM are normal. Right eye exhibits no discharge. Left eye exhibits no discharge. No scleral icterus.   Neurological: Patient is alert and oriented to person, place, and time. No cranial nerve deficit. Coordination normal.   Skin: No rash noted. Patient is not diaphoretic. No erythema. No pallor.  The patient has good eye contact.  No psychomotor retardation or stereotypical behaviors.  Speech was regular rate, regular rhythm, adequate responses.  Mood was stable and affect was congruent mood.  No suicidal or homicidal intent.  No hallucination.      Results for orders placed or performed in visit on 09/18/20   Thyroid Stimulating Hormone (TSH)   Result Value Ref Range    TSH 3.39 0.30 - 5.00 uIU/mL   T4, Free   Result Value Ref Range    Free T4 1.0 0.7 - 1.8 ng/dL   HM2(CBC w/o Differential)   Result Value Ref Range    WBC 4.6 4.0 - 11.0 thou/uL    RBC 4.96 3.80 - 5.40 mill/uL    Hemoglobin 14.9 12.0 - 16.0 g/dL    Hematocrit 43.4 35.0 - 47.0 %    MCV 88 80 - 100 fL    MCH 30.1 27.0 - 34.0 pg    MCHC 34.4 32.0 - 36.0 g/dL    RDW 11.1 11.0 - 14.5 %    Platelets 230 140 - 440 thou/uL    MPV 7.6 7.0 - 10.0 fL   Comprehensive Metabolic Panel   Result Value Ref Range    Sodium 140 136 - 145 mmol/L    Potassium 4.4 3.5 - 5.0 mmol/L    Chloride 105 98 - 107 mmol/L    CO2 25 22 - 31 mmol/L    Anion Gap, Calculation 10 5 - 18 mmol/L    Glucose 95 70 - 125 mg/dL    BUN 9 8 - 22 mg/dL    Creatinine 0.90 0.60 - 1.10 mg/dL    GFR MDRD Af Amer >60 >60 mL/min/1.73m2    GFR MDRD Non Af Amer >60 >60 mL/min/1.73m2    Bilirubin, Total 0.3 0.0 - 1.0 mg/dL    Calcium 9.5 8.5 - 10.5 mg/dL    Protein, Total 6.8 6.0 - 8.0 g/dL    Albumin 3.8 3.5 - 5.0 g/dL     Alkaline Phosphatase 36 (L) 45 - 120 U/L    AST 12 0 - 40 U/L    ALT 18 0 - 45 U/L

## 2021-06-14 NOTE — PATIENT INSTRUCTIONS - HE
What are Prescription Custom Orthotics?  Custom orthotics are specially-made devices designed to support and comfort your feet. Prescription orthotics are crafted for you and no one else. They match the contours of your feet precisely and are designed for the way you move. Orthotics are only manufactured after a podiatrist has conducted a complete evaluation of your feet, ankles, and legs, so the orthotic can accommodate your unique foot structure and pathology.  Prescription orthotics are divided into two categories:    Functional orthotics are designed to control abnormal motion. They may be used to treat foot pain caused by abnormal motion; they can also be used to treat injuries such as shin splints or tendinitis. Functional orthotics are usually crafted of a semi-rigid material such as plastic or graphite.    Accommodative orthotics are softer and meant to provide additional cushioning and support. They can be used to treat diabetic foot ulcers, painful calluses on the bottom of the foot, and other uncomfortable conditions.  Podiatrists use orthotics to treat foot problems such as plantar fasciitis, bursitis, tendinitis, diabetic foot ulcers, and foot, ankle, and heel pain. Clinical research studies have shown that podiatrist-prescribed foot orthotics decrease foot pain and improve function.  Orthotics typically cost more than shoe inserts purchased in a retail store, but the additional cost is usually well worth it. Unlike shoe inserts, orthotics are molded to fit each individual foot, so you can be sure that your orthotics fit and do what they're supposed to do. Prescription orthotics are also made of top-notch materials and last many years when cared for properly. Insurance often helps pay for prescription orthotics.  What are Shoe Inserts?   You've seen them at the grocery store and at the mall. You've probably even seen them on TV and online. Shoe inserts are any kind of non-prescription foot support  designed to be worn inside a shoe. Pre-packaged, mass produced, arch supports are shoe inserts. So are the  custom-made  insoles and foot supports that you can order online or at retail stores. Unless the device has been prescribed by a doctor and crafted for your specific foot, it's a shoe insert, not a custom orthotic device--despite what the ads might say.  Shoe inserts can be very helpful for a variety of foot ailments, including flat arches and foot and leg pain. They can cushion your feet, provide comfort, and support your arches, but they can't correct biomechanical foot problems or cure long-standing foot issues.  The most common types of shoe inserts are:    Arch supports: Some people have high arches. Others have low arches or flat feet. Arch supports generally have a  bumped-up  appearance and are designed to support the foot's natural arch.     Insoles: Insoles slip into your shoe to provide extra cushioning and support. Insoles are often made of gel, foam, or plastic.     Heel liners: Heel liners, sometimes called heel pads or heel cups, provide extra cushioning in the heel region. They may be especially useful for patients who have foot pain caused by age-related thinning of the heels' natural fat pads.     Foot cushions: Do your shoes rub against your heel or your toes? Foot cushions come in many different shapes and sizes and can be used as a barrier between you and your shoe.  Choosing an Over-the-Counter Shoe Insert  Selecting a shoe insert from the wide variety of devices on the market can be overwhelming. Here are some podiatrist-tested tips to help you find the insert that best meets your needs:    Consider your health. Do you have diabetes? Problems with circulation? An over-the-counter insert may not be your best bet. Diabetes and poor circulation increase your risk of foot ulcers and infections, so schedule an appointment with a podiatrist. He or she can help you select a solution that won't  cause additional health problems.     Think about the purpose. Are you planning to run a marathon, or do you just need a little arch support in your work shoes? Look for a product that fits your planned level of activity.     Bring your shoes. For the insert to be effective, it has to fit into your shoes. So bring your sneakers, dress shoes, or work boots--whatever you plan to wear with your insert. Look for an insert that will fit the contours of your shoe.     Try them on. If all possible, slip the insert into your shoe and try it out. Walk around a little. How does it feel? Don't assume that feelings of pressure will go away with continued wear. (If you can't try the inserts at the store, ask about the store's return policy and hold on to your receipt.)    Please call one of the [place] locations below to schedule an appointment. If you received a prescription please bring it with you to your appointment. Some locations are limited to what they carry.    Office Locations    [facility]  [address] [place] [address]  Home Medical Equipment, [address]   Phone: [phone]   Orthotics and Prosthetics, [address]   Phone: [phone]    [facility]  Home Medical Equipment  [address], [address] [address]   Phone: [phone]    Orthotics and Prosthetics ([facility])    [address], [address], [place] [address]  Phone: [phone]    [facility]  [address]   [place] [address]   Phone: [phone]    [facility]  [address]  [place] [address]  Phone: [phone]    [facility]   [address]  [place] [address]  Phone: [phone]    [facility]  [address]  [place] [address]  Phone: [phone]    [facility]  [address]  [place] [address]  Phone: [phone]    [address] [place] [address]   Phone: [phone]

## 2021-06-14 NOTE — PROGRESS NOTES
PROGRESS NOTE   12/29/2020    SUBJECTIVE:  Keyla Ni is a 30 y.o. female  who presents for   Chief Complaint   Patient presents with     Foot Pain     right foot pain, sx started 3 days ago, no known injury, ball of foot moves to top, painful to bare weight      Patient comes in today because she has had right foot pain.  The symptoms started about 3 days ago.  She does not member injuring her foot at all.  She notes that on Sunday morning she woke up and when she got out of bed it was hard to walk.  It seems like gradually the symptoms are improving but she was concerned because it was a lot of pain that she was having since Andrzej morning.  It seems like the symptoms are worse when she first gets up in the morning or after she sat for a long time and then tries to walk.  There is no tenderness in the foot itself and it does not seem like it swelling but it feels swollen to her.  Its not painful when she is not putting weight on it but it feels full and unusual.  She denies that she is having any sharp pain.  She has been taking ibuprofen 400 mg every 8 hours or so and that makes the pain tolerable but it does not take the pain away completely.  It does seem like over the last 3 days its been gradually improving.  The pain is primarily near the ball of the foot on the right side.  She has never had anything like this before.  She is overall a fairly healthy woman.    Patient Active Problem List   Diagnosis     Common Migraine (Without Aura)     Allergic Rhinitis     Insomnia     Hypothyroidism     Depression With Anxiety     Anemia     Acne       Current Outpatient Medications   Medication Sig Dispense Refill     azelastine (ASTELIN) 137 mcg (0.1 %) nasal spray 1 spray into each nostril 2 (two) times a day. 30 mL 0     cetirizine (ZYRTEC) 10 MG tablet Take 10 mg by mouth daily.       L norgest/e.estradioL-e.estrad (SEASONIQUE) 0.15 mg-30 mcg (84)/10 mcg (7) 3MPk Take 1 tablet by mouth daily. 1 Package 0      "levothyroxine (SYNTHROID, LEVOTHROID) 125 MCG tablet TAKE 1 TABLET BY MOUTH EVERY MORNING ON AN EMPTY STOMACH 90 tablet 1     spironolactone (ALDACTONE) 50 MG tablet TAKE 1 TABLET(50 MG) BY MOUTH TWICE DAILY 180 tablet 3     thyroid, pork, 60 mg Tab Take 1 tablet (60 mg total) by mouth daily. On an empty stomach with small sip of water at least 30 min prior to meal 90 tablet 0     famotidine (PEPCID) 20 MG tablet Take 20 mg by mouth 2 (two) times a day.       No current facility-administered medications for this visit.        Allergies   Allergen Reactions     Cat Hair Std Allergenic Ext      Grass      House Dust      Mold Extracts      Pollen      Trees      Venom-Honey Bee Swelling       Past Medical History:   Diagnosis Date     Abnormal Pap smear of cervix 2014    nl since     Acne      Allergic rhinitis     on allergy shots per Dr Gamino     Anemia      Major depressive disorder, recurrent episode, unspecified      Unspecified hypothyroidism        Past Surgical History:   Procedure Laterality Date     WISDOM TOOTH EXTRACTION  2011       Social History     Tobacco Use   Smoking Status Never Smoker   Smokeless Tobacco Never Used       OBJECTIVE:     /60   Pulse 78   Ht 5' 5.25\" (1.657 m)   Wt 166 lb (75.3 kg)   LMP 12/19/2020 (Exact Date)   Breastfeeding No   BMI 27.41 kg/m      Physical Exam:  GENERAL APPEARANCE: A&A, NAD, well hydrated, well nourished  SKIN:  Normal skin turgor, no lesions/rashes   CV: RRR, no M/G/R   LUNGS: CTAB  EXTREMITY: no swelling noted.  Full range of motion of all 4 extremities.  On exam specifically of the right foot she has full range of motion of the foot without any difficulty.  She is able to move her toes without problems.  There is no tenderness to palpation although she feels like there is pain inside further in the foot.  Points to the area about a centimeter below the base of the toes and between the second and third toe as to where the pain is.  Some of the pain " also goes on to the plantar surface of the foot in that area.  There is no redness there is no warmth to the touch there is no other evidence for infection.  There is no evidence for any skin breakdown.  NEURO: no gross deficits   PSYCHIATRIC;  Mood appropriate, memory intact      ASSESSMENT/PLAN:     Right foot pain [M79.671]      1. Right foot pain  - Ambulatory referral to Podiatry    Patient comes in today with right foot pain that she has had for about the last 3 days or so.  She does not have tenderness on palpation but the pain seems deeper in there.  It is at the base of the toes between the second and third toe.  I wonder about a neuroma as the cause of this pain.  We talked about that today at the visit.  At this point I think would be best for her to be evaluated by the podiatrist.  I did place a referral for podiatry and someone from the office should contact her regarding getting that scheduled.  She overall feels like things are doing okay and she is able to tolerate the pain currently.  We also talked about taking 2 Aleve twice a day for the next 5 days in a consistent manner to see if that would may be more helpful than the ibuprofen that she has been taking.  She can to try that and will let me know if she has increasing problems or concerns.  I did offer that we certainly could do an x-ray to see if there is anything broken but without any type of injury I would doubt that there is something broken.  She declined an x-ray today.  If she does not hear from someone from podiatry she certainly should let me know.  She was reminded that she is due for a physical exam we will make that in the near future.  She is already had her flu vaccination this season.  Alisha Landrum MD    This note was dictated using voice recognition software. Any grammatical errors, context distortions, or spelling errors are not intentional

## 2021-06-14 NOTE — PROGRESS NOTES
FOOT AND ANKLE SURGERY/PODIATRY CONSULT NOTE         ASSESSMENT:   Neuroma second intermetatarsal space right foot      TREATMENT:  The patient was given a cortisone injection today at the level of the common digital nerve of the second toe right foot consisting of 1/4 cc of dexamethasone sodium phosphate and 1/4 cc of 2% lidocaine plain.  The patient tolerated the injection well.  I have also recommended orthotics.  I informed the patient that if these conservative measures fail she may require surgical excision of the symptomatic neuroma.        HPI: I was asked to see Keylajustin Coyleters today to evaluate and treat right foot pain.  The patient stated that she has a very sharp pain which radiates between the second and third toes of the right foot along the bottom of the foot and into the arch of her foot.  She has had this sharp pain for approximately 10 days.  She stated that the pain can be quite severe at times.  Recently she had an episode where she had a difficult time with her gait.  She cannot fully weight-bear on her right foot.  She denies any trauma to her foot.  She has not had any redness or swelling with her pain.  The pain is mostly relieved with nonweightbearing.  She did try taking Aleve which gave her very little relief.  The patient was seen in consultation at the request of Alisha Landrum MD for evaluation and treatment of right foot pain.     Past Medical History:   Diagnosis Date     Abnormal Pap smear of cervix 2014    nl since     Acne      Allergic rhinitis     on allergy shots per Dr Gamino     Anemia      Major depressive disorder, recurrent episode, unspecified      Unspecified hypothyroidism        Past Surgical History:   Procedure Laterality Date     WISDOM TOOTH EXTRACTION  2011       Allergies   Allergen Reactions     Cat Hair Std Allergenic Ext      Grass      House Dust      Mold Extracts      Pollen      Trees      Venom-Honey Bee Swelling         Current Outpatient Medications:       azelastine (ASTELIN) 137 mcg (0.1 %) nasal spray, 1 spray into each nostril 2 (two) times a day., Disp: 30 mL, Rfl: 0     cetirizine (ZYRTEC) 10 MG tablet, Take 10 mg by mouth daily., Disp: , Rfl:      famotidine (PEPCID) 20 MG tablet, Take 20 mg by mouth 2 (two) times a day., Disp: , Rfl:      L norgest/e.estradioL-e.estrad (SEASONIQUE) 0.15 mg-30 mcg (84)/10 mcg (7) 3MPk, Take 1 tablet by mouth daily., Disp: 1 Package, Rfl: 0     levothyroxine (SYNTHROID, LEVOTHROID) 125 MCG tablet, TAKE 1 TABLET BY MOUTH EVERY MORNING ON AN EMPTY STOMACH, Disp: 90 tablet, Rfl: 1     spironolactone (ALDACTONE) 50 MG tablet, TAKE 1 TABLET(50 MG) BY MOUTH TWICE DAILY, Disp: 180 tablet, Rfl: 3     thyroid, pork, 60 mg Tab, Take 1 tablet (60 mg total) by mouth daily. On an empty stomach with small sip of water at least 30 min prior to meal, Disp: 90 tablet, Rfl: 0    Family History   Problem Relation Age of Onset     Asthma Brother      Breast cancer Maternal Aunt         dx age 61     Depression Father      Snoring Father      Depression Mother      Uterine cancer Mother      Lung cancer Maternal Grandmother      Cancer Maternal Grandmother         bladder     Thyroid disease Paternal Grandmother      Osteoporosis Paternal Grandmother      Sleep apnea Sister        Social History     Socioeconomic History     Marital status: Single     Spouse name: Not on file     Number of children: 0     Years of education: Not on file     Highest education level: Not on file   Occupational History     Occupation: [employer]   Social Needs     Financial resource strain: Not on file     Food insecurity     Worry: Not on file     Inability: Not on file     Transportation needs     Medical: Not on file     Non-medical: Not on file   Tobacco Use     Smoking status: Never Smoker     Smokeless tobacco: Never Used   Substance and Sexual Activity     Alcohol use: Yes     Alcohol/week: 3.0 standard drinks     Types: 3 Standard drinks or equivalent per week      Drug use: No     Sexual activity: Yes     Partners: Male     Birth control/protection: Pill   Lifestyle     Physical activity     Days per week: Not on file     Minutes per session: Not on file     Stress: Not on file   Relationships     Social connections     Talks on phone: Not on file     Gets together: Not on file     Attends Quaker service: Not on file     Active member of club or organization: Not on file     Attends meetings of clubs or organizations: Not on file     Relationship status: Not on file     Intimate partner violence     Fear of current or ex partner: Not on file     Emotionally abused: Not on file     Physically abused: Not on file     Forced sexual activity: Not on file   Other Topics Concern     Not on file   Social History Narrative     Not on file       Review of Systems - Patient denies fever, chills, rash, wound, stiffness, limping, numbness, weakness, heart burn, blood in stool, chest pain with activity, calf pain when walking, shortness of breath with activity, chronic cough, easy bleeding/bruising, swelling of ankles, excessive thirst, fatigue, depression, anxiety.  Patient admits to sharp right foot pain.      OBJECTIVE:  Appearance: alert, well appearing, and in no distress.    Vitals:    01/06/21 1321   Pulse: 68   SpO2: 98%       BMI= Body mass index is 27.41 kg/m .    General appearance: Patient is alert and fully cooperative with history & exam.  No sign of distress is noted during the visit.  Psychiatric: Affect is pleasant & appropriate.  Patient appears motivated to improve health.  Respiratory: Breathing is regular & unlabored while sitting.  HEENT: Hearing is intact to spoken word.  Speech is clear.  No gross evidence of visual impairment that would impact ambulation.    Vascular: Dorsalis pedis and posterior tibial pulses are palpable. There is no pedal hair growth bilaterally.  CFT < 3 sec from anterior tibial surface to distal digits bilaterally. There is no  appreciable edema noted.  Dermatologic: Turgor and texture are within normal limits. No coloration or temperature changes. No primary or secondary lesions noted.  Neurologic: All epicritic and proprioceptive sensations are grossly intact bilaterally.  There is a positive Bryson sign second intermetatarsal space right foot.  Musculoskeletal: All active and passive ankle, subtalar, midtarsal, and 1st MPJ range of motion are grossly intact without pain or crepitus, with the exception of none. Manual muscle strength is within normal limits bilaterally. All dorsiflexors, plantarflexors, invertors, evertors are intact bilaterally. Tenderness present to the second intermetatarsal space right foot on palpation.  No tenderness to the right foot or ankle with range of motion. Calf is soft/non-tender without warmth/induration    Imaging:         No results found.       Roe Almeida; ALMITA  [facility] Foot & Ankle Surgery/Podiatry

## 2021-06-14 NOTE — PROGRESS NOTES
Assessment:      Healthy female exam.      Plan:       All questions answered.  Await pap smear results.      ASSESSMENT: 30 y.o. female physical exam and pap smear.    PLAN:     Routine general medical examination at a health care facility [Z00.00]    1. Routine general medical examination at a health care facility  - HM2(CBC w/o Differential)  - Urinalysis Macroscopic  - Lipid Cascade FASTING  - Gynecologic Cytology (PAP Smear)  - Urine,Microscopic  - HPV High Risk DNA Cervical    2. Allergic rhinitis, unspecified seasonality, unspecified trigger    3. Acquired hypothyroidism  - Thyroid Stimulating Hormone (TSH)  - T4, Free    4. Acne vulgaris  - Comprehensive Metabolic Panel  - spironolactone (ALDACTONE) 50 MG tablet; TAKE 1 TABLET(50 MG) BY MOUTH TWICE DAILY  Dispense: 180 tablet; Refill: 1    5. Anemia, unspecified type  - HM2(CBC w/o Differential)    6. Primary insomnia    7. Depression With Anxiety    8. Gastroesophageal reflux disease without esophagitis  - Comprehensive Metabolic Panel    9. Encounter for contraceptive management, unspecified type  - L norgest/e.estradioL-e.estrad (SEASONIQUE) 0.15 mg-30 mcg (84)/10 mcg (7) 3MPk; Take 1 tablet by mouth daily.  Dispense: 1 Package; Refill: 4      Patient is a 30 y.o. female who is overall doing well.  She does have a history of allergies and is currently using Zyrtec.  She does have Astelin at home but does not have to use that very frequently.  Her allergies seem to be under fairly good control.  She also has a history of hypothyroidism we will go ahead and check thyroid levels today.  She does not need a refill of her thyroid medication today but when she does she certainly will let me know.  She continues on spironolactone for acne I will go ahead and check metabolic panel today to follow-up on that.  She does need a refill of her spironolactone as well.  It seems to be working very well for her.  She has a history of anemia and so we will go ahead and  check a CBC today.  She continues to struggle with chronic insomnia.  She has been seen at the sleep clinic.  They suggested that she have a sleep study and she has not done that as the virus and Covid started and she has not followed up.  At this point she is not interested in following up but if that changes in the future will certainly let me know.  She does have a history of anxiety and depression but overall these seem to be doing well as well.  Please see PHQ 9 which was completed today.  She is not suicidal or homicidal and does not think that she needs additional resources currently.  If she does in the future will let me know.  We will go ahead and check metabolic panel for history of gastroesophageal reflux disease.  She has found that this is under much better control recently and has not had to take the Pepcid on a regular basis.  If that changes will certainly let me know.  She does continue on Seasonique as her method of birth control which seems to be working well.  She is bleeding appropriately.  Refill of that was given with 4 refills which should take her through the next year.  She does note that her stress level is quite high currently.  They are trying to buy a house and they just recently got engaged so she is trying to plan a wedding.  She also has a lot of stress at work but seems to be managing all of that quite well.  Again if she needs additional assistance with that will let me know.  All of her questions and concerns were addressed today.  If she has additional problems or concerns should let me know.    Will contact her with the results of the labs when available.  Additional 15 minutes spent with patient discussing history of anemia, allergies, hypothyroidism, and history of chronic insomnia as well as depression anxiety and gastroesophageal reflux disease..    Voice recognition software was used in the creation of this note. Any grammatical or nonsensical errors are due to inherent  errors with the software and are not the intention of the writer.      Alisha Landrum M.D.       Subjective:      Keyla Ni is a 30 y.o. female who presents for an annual exam. The patient is sexually active. The patient participates in regular exercise: no. The patient reports that there is not domestic violence in her life.  Patient recently got engaged and is planning a wedding.  She notes that overall that seems to be doing okay however certainly has added stress to her life.  She also is in the process of trying to buy a house and that of course is added stress.  She also is under a lot of stress with her job.  She does have a history of anxiety and depression.  Please see PHQ 9 and CORNELIA which are both completed in their entirety today.  She is not suicidal or homicidal.  She feels like overall things are doing okay.  She has enough help around her and does not feel like she needs any additional assistance currently.  She does get some nausea occasionally but that seems to be anxiety related.  She continues on Seasonique for her birth control.  This seems to be working well.  She is bleeding when she supposed to and she is not bleeding at other times.  She does need a refill of that.  She is also on spironolactone for acne.  That seems to be working well as well.  She does need a refill of that today.  She also need labs to follow-up on that.  She continues with her allergy symptoms but seems to be doing well with the Zyrtec.  She also has a history of hypothyroidism and continues on her thyroid medication.  She does need thyroid levels to be drawn today.  She does have a history of anemia we will go ahead and draw a CBC today.  She continues to struggle with her sleep.  This has been a chronic issue for her.  She has seen sleep medicine in the past and they have not really been all that helpful.  They told her that she needed to do a sleep study and she has not done that yet.  Covid hit and then she  did not want to do the sleep study.  At this point she still continues to not want to do a sleep study but if she wants that in the future will let someone know.  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  She is already had a flu vaccination this season.  She does have a history of gastroesophageal reflux disease which has overall been doing okay.  She does have Pepcid at home but she only has to use it on an as-needed basis.  She overall feels like things are doing fairly well in that regard currently.    Healthy Habits:   Regular Exercise: No  Sunscreen Use: Yes  Healthy Diet: Somewhat  Dental Visits Regularly: Yes  Seat Belt: Yes  Sexually active: Yes  Self Breast Exam Monthly:Yes  Hemoccults: No  Flex Sig: No  Colonoscopy: No  Lipid Profile: Yes  Glucose Screen: Yes  Prevention of Osteoporosis: Yes  Last Dexa: No  Guns at Home:  No      Immunization History   Administered Date(s) Administered     COVID-19,PF,Pfizer 2021     Dtap 1995     HPV Quadrivalent 2009, 2009, 10/30/2009     Hep A, Adult IM (19yr & older) 2010, 2011     Hep B, Peds or Adolescent 2001, 11/15/2001, 2002     IPV 1995     Influenza, inj, historic,unspecified 2015, 2016, 10/31/2019, 10/12/2020     Influenza,seasonal, Inj IIV3 10/01/2013     MMR 10/24/1991, 1997     Tdap 2007, 2012     Immunization status: up to date and documented.    No exam data present    Gynecologic History  Patient's last menstrual period was 2021 (exact date).  Contraception: OCP (estrogen/progesterone)  Last Pap: . Results were: normal  Last mammogram: N/A. Results were: N/A.      OB History    Para Term  AB Living   0 0 0 0 0 0   SAB TAB Ectopic Multiple Live Births   0 0 0 0 0       Current Outpatient Medications   Medication Sig Dispense Refill     cetirizine (ZYRTEC) 10 MG tablet Take 10 mg by mouth daily.       cholecalciferol,  vitamin D3, (VITAMIN D3) 25 mcg (1,000 unit) capsule Take 1,000 Units by mouth daily.       L norgest/e.estradioL-e.estrad (SEASONIQUE) 0.15 mg-30 mcg (84)/10 mcg (7) 3MPk Take 1 tablet by mouth daily. 1 Package 4     prenatal no115-iron-folic acid 29 mg iron- 1 mg Chew Chew daily.       spironolactone (ALDACTONE) 50 MG tablet TAKE 1 TABLET(50 MG) BY MOUTH TWICE DAILY 180 tablet 1     thyroid, pork, 60 mg Tab Take 1 tablet (60 mg total) by mouth daily. On an empty stomach with small sip of water at least 30 min prior to meal 90 tablet 0     azelastine (ASTELIN) 137 mcg (0.1 %) nasal spray 1 spray into each nostril 2 (two) times a day. 30 mL 0     famotidine (PEPCID) 20 MG tablet Take 20 mg by mouth 2 (two) times a day.       No current facility-administered medications for this visit.      Past Medical History:   Diagnosis Date     Abnormal Pap smear of cervix 2014    nl since     Acne      Allergic rhinitis      Anemia      Anxiety      Major depressive disorder, recurrent episode, unspecified      Unspecified hypothyroidism      Past Surgical History:   Procedure Laterality Date     WISDOM TOOTH EXTRACTION  2011     Cat hair std allergenic ext, Grass, House dust, Mold extracts, Pollen, Trees, and Venom-honey bee  Family History   Problem Relation Age of Onset     Asthma Brother      Breast cancer Maternal Aunt         dx age 61     Depression Father      Snoring Father      Depression Mother      Uterine cancer Mother      Lung cancer Maternal Grandmother      Cancer Maternal Grandmother         bladder     Thyroid disease Paternal Grandmother      Osteoporosis Paternal Grandmother      COPD Paternal Grandmother      Sleep apnea Sister      Social History     Socioeconomic History     Marital status: Single     Spouse name: JAMIE Barth     Number of children: 0     Years of education: Not on file     Highest education level: Not on file   Occupational History     Occupation: [employer]     Comment: [employer]  "  Social Needs     Financial resource strain: Not on file     Food insecurity     Worry: Not on file     Inability: Not on file     Transportation needs     Medical: Not on file     Non-medical: Not on file   Tobacco Use     Smoking status: Never Smoker     Smokeless tobacco: Never Used   Substance and Sexual Activity     Alcohol use: Yes     Alcohol/week: 3.0 standard drinks     Types: 3 Standard drinks or equivalent per week     Frequency: Monthly or less     Drinks per session: 1 or 2     Binge frequency: Less than monthly     Drug use: No     Sexual activity: Yes     Partners: Male     Birth control/protection: Pill   Lifestyle     Physical activity     Days per week: Not on file     Minutes per session: Not on file     Stress: Not on file   Relationships     Social connections     Talks on phone: Not on file     Gets together: Not on file     Attends Latter-day service: Not on file     Active member of club or organization: Not on file     Attends meetings of clubs or organizations: Not on file     Relationship status: Not on file     Intimate partner violence     Fear of current or ex partner: Not on file     Emotionally abused: Not on file     Physically abused: Not on file     Forced sexual activity: Not on file   Other Topics Concern     Not on file   Social History Narrative     Not on file               Objective:         Vitals:    01/29/21 0849   BP: 122/64   Pulse: 86   SpO2: 96%   Weight: 162 lb (73.5 kg)   Height: 5' 4.75\" (1.645 m)     Body mass index is 27.17 kg/m .       REVIEW OF SYSTEMS:   Denies fever, chills, visual changes, fatigue, myalgias, nasal congestion, rhinorrhea, ear pain or discharge, sore throat, swollen glands, breast mass, nipple discharge, breast changes, abdominal pain, nausea, vomiting, diarrhea, constipation, cough, shortness of breath, chest pain, weight change, change in bowel habits, melena, rectal bleeding, dysuria, frequency, urgency, hematuria, polyuria, polydipsia, " "polyphagia, joint pain or swelling or erythema, edema, rash, weakness, paresthesias, vaginal discharge or bleeding or mood changes other than that mentioned above in HPI.   Remainder of review of systems was negative.      PHYSICAL EXAM:  On exam, patient is a WD, WN 30 y.o. female in NAD.  /64   Pulse 86   Ht 5' 4.75\" (1.645 m)   Wt 162 lb (73.5 kg)   LMP 01/19/2021 (Exact Date)   SpO2 96%   BMI 27.17 kg/m    Head normocephalic, atraumatic.  Eyes PERRL, ears TM s clear bilaterally.  Throat without significant erythemia or exudate.  Neck was supple, full range of motion. No significant lymphadenopathy or thyromegaly was appreciated.  Lungs clear to auscultation  Heart regular rate and rhythm.  Breast exam was done. No masses were appreciated. Axilla were clear bilaterally. No nipple discharge was appreciated. Self breast exam was reviewed and taught today.  Abdomen was soft, nontender, nondistended. No masses or organomegaly were palpated. Positive bowel sounds were appreciated.  Extremities with full range of motion of all 4 extremities were noted.  Deep tendon reflexes were equal and symmetrical. Motor and sensation were intact to both the upper and lower extremities.  Cranial nerves 2 through 12 were grossly intact.  EOM were intact.  Pelvic exam was done.  External genitalia appeared normal. Speculum was introduced and the Pap smear obtained. Bimanual exam revealed uterus to be normal size and adenexa without palpable masses.   Patient does have small patches of acne noted but overall her acne is under very good control.  A&O x3, thought processes congruent, non-tangential. No hallucinations/delusions. Insight/judgment: intact. Denies suicidal/homicidal ideations.    Little interest or pleasure in doing things: Several days  Feeling down, depressed, or hopeless: Several days  Trouble falling or staying asleep, or sleeping too much: Nearly every day  Feeling tired or having little energy: Nearly every " day  Poor appetite or overeating: Not at all  Feeling bad about yourself - or that you are a failure or have let yourself or your family down: Not at all  Trouble concentrating on things, such as reading the newspaper or watching television: Not at all  Moving or speaking so slowly that other people could have noticed. Or the opposite - being so fidgety or restless that you have been moving around a lot more than usual: Not at all  Thoughts that you would be better off dead, or of hurting yourself in some way: Not at all  PHQ-9 Total Score: 8  If you checked off any problems, how difficult have these problems made it for you to do your work, take care of things at home, or get along with other people?: Somewhat difficult      LABS:    Recent Results (from the past 240 hour(s))   HM2(CBC w/o Differential)   Result Value Ref Range    WBC 4.9 4.0 - 11.0 thou/uL    RBC 4.82 3.80 - 5.40 mill/uL    Hemoglobin 14.3 12.0 - 16.0 g/dL    Hematocrit 42.9 35.0 - 47.0 %    MCV 89 80 - 100 fL    MCH 29.7 27.0 - 34.0 pg    MCHC 33.4 32.0 - 36.0 g/dL    RDW 11.1 11.0 - 14.5 %    Platelets 200 140 - 440 thou/uL    MPV 7.4 7.0 - 10.0 fL   Lipid Cascade FASTING   Result Value Ref Range    Cholesterol 123 <=199 mg/dL    Triglycerides 68 <=149 mg/dL    HDL Cholesterol 51 >=50 mg/dL    LDL Calculated 58 <=129 mg/dL    Patient Fasting > 8hrs? Yes    Comprehensive Metabolic Panel   Result Value Ref Range    Sodium 140 136 - 145 mmol/L    Potassium 3.9 3.5 - 5.0 mmol/L    Chloride 107 98 - 107 mmol/L    CO2 27 22 - 31 mmol/L    Anion Gap, Calculation 6 5 - 18 mmol/L    Glucose 76 70 - 125 mg/dL    BUN 8 8 - 22 mg/dL    Creatinine 0.80 0.60 - 1.10 mg/dL    GFR MDRD Af Amer >60 >60 mL/min/1.73m2    GFR MDRD Non Af Amer >60 >60 mL/min/1.73m2    Bilirubin, Total 0.6 0.0 - 1.0 mg/dL    Calcium 9.1 8.5 - 10.5 mg/dL    Protein, Total 6.4 6.0 - 8.0 g/dL    Albumin 3.9 3.5 - 5.0 g/dL    Alkaline Phosphatase 43 (L) 45 - 120 U/L    AST 15 0 - 40 U/L     ALT 22 0 - 45 U/L   Thyroid Stimulating Hormone (TSH)   Result Value Ref Range    TSH 0.03 (L) 0.30 - 5.00 uIU/mL   T4, Free   Result Value Ref Range    Free T4 0.9 0.7 - 1.8 ng/dL   Urinalysis Macroscopic   Result Value Ref Range    Color, UA Yellow Colorless, Yellow, Straw, Light Yellow    Clarity, UA Clear Clear    Glucose, UA Negative Negative    Bilirubin, UA Negative Negative    Ketones, UA Negative Negative    Specific Gravity, UA 1.010 1.005 - 1.030    Blood, UA Small (!) Negative    pH, UA 6.0 5.0 - 8.0    Protein, UA Negative Negative mg/dL    Urobilinogen, UA 0.2 E.U./dL 0.2 E.U./dL, 1.0 E.U./dL    Nitrite, UA Negative Negative    Leukocytes, UA Negative Negative   Urine,Microscopic   Result Value Ref Range    Bacteria, UA Few (!) None Seen hpf    RBC, UA 0-2 None Seen, 0-2 hpf    WBC, UA 0-5 None Seen, 0-5 hpf    Squam Epithel, UA 0-5 None Seen, 0-5 lpf   HPV High Risk DNA Cervical   Result Value Ref Range    HPV Source [facility]     HPV16 DNA Negative NEG    HPV18 DNA Negative NEG    Other HR HPV Negative NEG    Final Diagnosis SEE NOTES     Specimen Description Cervical Cells

## 2021-06-15 PROBLEM — L70.9 ACNE: Status: ACTIVE | Noted: 2017-01-16

## 2021-06-15 NOTE — PROGRESS NOTES
Keyla Ni is a 30 y.o. female who is being evaluated via a billable video visit.      How would you like to obtain your AVS? MyChart.    Will anyone else be joining your video visit? No      Video Start Time: 2:02 PM    Assessment & Plan     Keyla was seen today for follow-up.    Diagnoses and all orders for this visit:    Anxiety, Mild episode of recurrent major depressive disorder (H)  Start Lexapro as pharmacotherapy for her symptoms.  I recommended psychotherapy but she was not sure due to finances and time.  She will think about this.  Motivational interviewing was utilized today.  Modified cognitive behavioral therapy was performed with counseling on internal locus of control.  Discussed importance of self care, sleep, nutrition, hydration, exercise, and mindfulness   She will follow-up with her primary care provider in 1 month  -     escitalopram oxalate (LEXAPRO) 5 MG tablet; Take 1 tablet (5 mg total) by mouth daily.    Primary insomnia  Start trazodone as pharmacotherapy for her symptoms.  I recommended psychotherapy but she was not sure due to finances and time.  She will think about this.  Motivational interviewing was utilized today.  Modified cognitive behavioral therapy was performed with counseling on internal locus of control.  Discussed importance of self care, sleep, nutrition, hydration, exercise, and mindfulness   She will follow-up with her primary care provider in 1 month  -     traZODone (DESYREL) 50 MG tablet; Take 1 tablet (50 mg total) by mouth at bedtime.    Hypothyroidism  Suspect her symptoms are completely controlled.  Suspect thyroid function is not completely optimized.  She will follow-up with her primary care provider  Review of prior external note(s) from - Visit with PCP in January 29, 2021 and lab results from January 29, 2021    Scarlett Paz MD  [facility]   Keyla Ni is 30 y.o. and presents today for  "the following health issues   HPI     30-year-old female with a history of anxiety and depression comes in today stating that her anxiety symptoms are getting worse.  She is having trouble with \"turning off the brain\".  She is having more trouble with sleeping getting on average 3 to 4 hours asleep at night.  She had moved into her new house but does not think that this is the sole trigger for her symptoms.  She was seen a therapist for some time but no longer seeing a therapist.  She was taking Effexor and sertraline at some point but also is not taking these medications.  As for sleep, she has tried doxepin, hydroxyzine, lorazepam but has found minimal benefit with these medications    Little interest or pleasure in doing things: Several days  Feeling down, depressed, or hopeless: Several days  Trouble falling or staying asleep, or sleeping too much: Nearly every day  Feeling tired or having little energy: Nearly every day  Poor appetite or overeating: Several days  Feeling bad about yourself - or that you are a failure or have let yourself or your family down: Several days  Trouble concentrating on things, such as reading the newspaper or watching television: More than half the days  Moving or speaking so slowly that other people could have noticed. Or the opposite - being so fidgety or restless that you have been moving around a lot more than usual: Not at all  Thoughts that you would be better off dead, or of hurting yourself in some way: Not at all  PHQ-9 Total Score: 12  If you checked off any problems, how difficult have these problems made it for you to do your work, take care of things at home, or get along with other people?: Very difficult    CORNELIA-7 Screening Results:  Feeling nervous, anxious or on edge: 3 (3/4/2021  1:00 PM)  Not being able to stop or control worry: 2 (3/4/2021  1:00 PM)  Worrying too much about different things: 2 (3/4/2021  1:00 PM)  Trouble relaxin (3/4/2021  1:00 PM)  Being so " restless that is is hard to sit still: 2 (3/4/2021  1:00 PM)  Becoming easily annnoyed or irritable: 2 (3/4/2021  1:00 PM)  Feeling afraid as if something awful might happen: 1 (3/4/2021  1:00 PM)  CORNELIA-7 Total: 14 (3/4/2021  1:00 PM)  How difficult did these problems make it for you to do your work, take care of things at home or get along with other people? : Very difficult (3/4/2021  1:00 PM)  How difficult did these problems make it for you to do your work, take care of things at home or get along with other people? : Very difficult (3/4/2021  1:00 PM)    Also with hypothyroidism.  Medication has been switched from levothyroxine to [place] Thyroid.  Her recent TSH and T4 lab were reviewed.  TSH is suppressed a little bit with T4 being normal.  I do not see a T3 result.  I reviewed her recent notes with her PCP last month.  She finds herself feeling a little better on [place] Thyroid.  States that skin and hair symptoms are better.  Still with daytime sleep sleepiness and chronic constipation.      Objective       Vitals:  No vitals were obtained today due to virtual visit.    Physical Exam  Alert, oriented          Video-Visit Details    Type of service:  Video Visit    Video End Time (time video stopped): 2:30pm  Originating Location (pt. Location): Home    Distant Location (provider location):  [facility]     Platform used for Video Visit: David

## 2021-06-15 NOTE — TELEPHONE ENCOUNTER
last OV: 1/29/21- w/ Dr. Landrum, PCP  last fill was written by you: 11/5/20 at OV for fatigue and anxiety.       HAMMAD StewartA

## 2021-06-15 NOTE — PROGRESS NOTES
ASSESSMENT/PLAN:  27-year-old female with hypothyroidism, anxiety, mood disorder presents with worsening anxiety and depressed mood.  I will like to first check her thyroid function and correct her current dose if indicated.  If her thyroid function is normal, I will consider sertraline 25 mg.  We spoke about sertraline, indication, side effects.  May also want to consider hydroxyzine for as needed during the day.  We spoke in length about sleep quality.  She has tried trazodone, melatonin, Benadryl without much relief.  She has seen the sleep clinic as well.  Consider doxepin for sleep but she was hesitant.  She will update me in 2 weeks regarding her symptoms.    Anxiety  -     Thyroid Stimulating Hormone (TSH)  -     T4, Free  -     Vitamin D, Total (25-Hydroxy)    Mood disorder  -     Thyroid Stimulating Hormone (TSH)  -     T4, Free  -     Vitamin D, Total (25-Hydroxy)    Hypothyroidism  -     Thyroid Stimulating Hormone (TSH)  -     T4, Free  -     JIC LAV      Orders Placed This Encounter   Procedures     Thyroid Stimulating Hormone (TSH)     T4, Free     Vitamin D, Total (25-Hydroxy)     JIC LAV       CHIEF COMPLAINT:  Chief Complaint   Patient presents with     discuss anxiety med       HISTORY OF PRESENT ILLNESS:  Keyla is a 27 y.o. female presenting to the clinic today for anxiety. She has a history of anxiety/depression. She took Effexor about 2 years ago. She felt like she did not need the medication much at that time, and did not like how she felt if she missed a dose of Effexor. She now feels like she would like to go back on a medication. She has had some increased work stress, and some deaths in the family that have caused her the increased anxiety and mood changes. She feels like she is always on edge recently. She has had some episodes of feeling overly anxious and panicky. She has had times when she feels shaky and nauseated. She usually just tries to work through these feelings. She has  always struggled with sleep, but this has worsened in the past 3 months. She has tried benadryl at bedtime to help with sleep; she also tried trazodone, melatonin and essential oils without much help with sleep. Her energy during the day is somewhat decreased. No one has told her that she snores. She notes that there is a family history of sleep problems. She is still taking her levothyroxine 137mcg, and her last TSH check was about one year.     Little interest or pleasure in doing things: Not at all  Feeling down, depressed, or hopeless: Several days  Trouble falling or staying asleep, or sleeping too much: Nearly every day  Feeling tired or having little energy: Several days  Poor appetite or overeating: Not at all  Feeling bad about yourself - or that you are a failure or have let yourself or your family down: Several days  Trouble concentrating on things, such as reading the newspaper or watching television: Not at all  Moving or speaking so slowly that other people could have noticed. Or the opposite - being so fidgety or restless that you have been moving around a lot more than usual: More than half the days  Thoughts that you would be better off dead, or of hurting yourself in some way: Not at all  PHQ-9 Total Score: 8  If you checked off any problems, how difficult have these problems made it for you to do your work, take care of things at home, or get along with other people?: Very difficult    Feeling nervous, anxious or on edge: 2  Not being able to stop or control worry: 2  Worrying too much about different things: 3  Trouble relaxin  Being so restless that is is hard to sit still: 1  Becoming easily annnoyed or irritable: 2  Feeling afraid as if something awful might happen: 0  CORNELIA-7 Total: 11  How difficult did these problems make it for you to do your work, take care of things at home or get along with other people? : Very difficult      REVIEW OF SYSTEMS:   Constitutional: Negative.   HENT:  Negative.   Eyes: Negative.   Respiratory: Negative.   Cardiovascular: Negative.   Gastrointestinal: Negative.   Endocrine: Negative.   Genitourinary: Negative.   Musculoskeletal: Negative.   Skin: Negative.   Allergic/Immunologic: Negative.   Neurological: Negative.   Hematological: Negative.   All other systems are negative.    PFSH:  No new history.     TOBACCO USE:  History   Smoking Status     Never Smoker   Smokeless Tobacco     Never Used       VITALS:  Vitals:    01/16/18 0948   BP: 100/72   Pulse: 81   SpO2: 97%   Weight: 161 lb 2 oz (73.1 kg)     Wt Readings from Last 3 Encounters:   01/16/18 161 lb 2 oz (73.1 kg)   04/28/17 167 lb 8 oz (76 kg)   01/16/17 169 lb 11.2 oz (77 kg)       PHYSICAL EXAM:  Constitutional: Patient is oriented to person, place, and time. Patient appears well-developed and well-nourished. No distress.   Cardiovascular: Normal rate.  Pulmonary/Chest: Effort normal. No respiratory distress.   Neurological: Patient is alert and oriented to person, place, and time.  The patient has good eye contact.  No psychomotor retardation or stereotypical behaviors.  Speech is regular rate, regular rhythm, adequate responses.  Mood is stable and affect is congruent mood.  No suicidal or homicidal intent.  No hallucination.    Lab Results   Component Value Date    TSH 0.99 03/17/2017       ADDITIONAL HISTORY SUMMARIZED (2): Reviewed note from 9/23/2015 from Dr. Landrum regarding anxiety/depression.  DECISION TO OBTAIN EXTRA INFORMATION (1): None.   RADIOLOGY TESTS (1): None.  LABS (1): Reviewed and ordered labs.  MEDICINE TESTS (1): None.  INDEPENDENT REVIEW (2 each): None.     I, Felicita Benjamin, am scribing for and in the presence of, Dr. Manzo.    Scarlett MEYERS MD , personally performed the services described in this documentation, as scribed by Felicita Benjamin in my presence, and it is both accurate and complete.    MEDICATIONS:  Current Outpatient  Prescriptions   Medication Sig Dispense Refill     cetirizine-pseudoephedrine (ZYRTEC-D) 5-120 mg per tablet Take 1 tablet by mouth daily.       levothyroxine (SYNTHROID, LEVOTHROID) 137 MCG tablet TAKE 1 TABLET(137 MCG) BY MOUTH DAILY 90 tablet 1     norgestimate-ethinyl estradiol (SPRINTEC, 28,) 0.25-35 mg-mcg per tablet Take 1 tablet by mouth daily. 3 Package 2     spironolactone (ALDACTONE) 50 MG tablet TAKE 1 TABLET(50 MG) BY MOUTH DAILY 90 tablet 1     No current facility-administered medications for this visit.        Total data points: 3

## 2021-06-16 PROBLEM — K21.9 GASTROESOPHAGEAL REFLUX DISEASE WITHOUT ESOPHAGITIS: Status: ACTIVE | Noted: 2021-01-28

## 2021-06-16 NOTE — TELEPHONE ENCOUNTER
Refill Approved    Rx renewed per Medication Renewal Policy. Medication was last renewed on 2/9/21, last OV 3/4/21.    Silva Sow, [facility] Triage/Med Refill 3/20/2021     Requested Prescriptions   Pending Prescriptions Disp Refills     escitalopram oxalate (LEXAPRO) 5 MG tablet [Pharmacy Med Name: ESCITALOPRAM 5MG TABLETS] 30 tablet 0     Sig: TAKE 1 TABLET(5 MG) BY MOUTH DAILY       SSRI Refill Protocol  Passed - 3/20/2021  7:18 PM        Passed - PCP or prescribing provider visit in last year     Last office visit with prescriber/PCP: 11/5/2020 Scarlett Diaz MD OR same dept: 11/5/2020 Scarlett Diaz MD OR same specialty: 12/29/2020 Alisha Landrum MD  Last physical: Visit date not found Last MTM visit: Visit date not found   Next visit within 3 mo: Visit date not found  Next physical within 3 mo: Visit date not found  Prescriber OR PCP: Scarlett Paz MD  Last diagnosis associated with med order: 1. Anxiety  - escitalopram oxalate (LEXAPRO) 5 MG tablet [Pharmacy Med Name: ESCITALOPRAM 5MG TABLETS]; TAKE 1 TABLET(5 MG) BY MOUTH DAILY  Dispense: 30 tablet; Refill: 0    2. Mild episode of recurrent major depressive disorder (H)  - escitalopram oxalate (LEXAPRO) 5 MG tablet [Pharmacy Med Name: ESCITALOPRAM 5MG TABLETS]; TAKE 1 TABLET(5 MG) BY MOUTH DAILY  Dispense: 30 tablet; Refill: 0    3. Chronic fatigue  - NP THYROID 60 mg Tab [Pharmacy Med Name: THYROID NP 1GR (60MG) TABLETS]; TAKE 1 TABLET BY MOUTH DAILY. ON AN EMPTY STOMACH WITH SMALL SIP OF WATER AT LEAST 30 MINUTES PRIOR TO MEAL  Dispense: 30 tablet; Refill: 0    If protocol passes may refill for 12 months if within 3 months of last provider visit (or a total of 15 months).                NP THYROID 60 mg Tab [Pharmacy Med Name: THYROID NP 1GR (60MG) TABLETS] 30 tablet 0     Sig: TAKE 1 TABLET BY MOUTH DAILY. ON AN EMPTY STOMACH WITH SMALL SIP OF WATER AT LEAST 30 MINUTES PRIOR TO MEAL        Thyroid Hormones Protocol Passed - 3/20/2021  7:18 PM        Passed - Provider visit in past 12 months or next 3 months     Last office visit with prescriber/PCP: 11/5/2020 Scarlett Diaz MD OR same dept: 11/5/2020 Scarlett Diaz MD OR same specialty: 12/29/2020 Alisha Landrum MD  Last physical: Visit date not found Last MTM visit: Visit date not found   Next visit within 3 mo: Visit date not found  Next physical within 3 mo: Visit date not found  Prescriber OR PCP: Scarlett Paz MD  Last diagnosis associated with med order: 1. Anxiety  - escitalopram oxalate (LEXAPRO) 5 MG tablet [Pharmacy Med Name: ESCITALOPRAM 5MG TABLETS]; TAKE 1 TABLET(5 MG) BY MOUTH DAILY  Dispense: 30 tablet; Refill: 0    2. Mild episode of recurrent major depressive disorder (H)  - escitalopram oxalate (LEXAPRO) 5 MG tablet [Pharmacy Med Name: ESCITALOPRAM 5MG TABLETS]; TAKE 1 TABLET(5 MG) BY MOUTH DAILY  Dispense: 30 tablet; Refill: 0    3. Chronic fatigue  - NP THYROID 60 mg Tab [Pharmacy Med Name: THYROID NP 1GR (60MG) TABLETS]; TAKE 1 TABLET BY MOUTH DAILY. ON AN EMPTY STOMACH WITH SMALL SIP OF WATER AT LEAST 30 MINUTES PRIOR TO MEAL  Dispense: 30 tablet; Refill: 0    If protocol passes may refill for 12 months if within 3 months of last provider visit (or a total of 15 months).             Passed - TSH on file in past 12 months for patient age 12 & older     TSH   Date Value Ref Range Status   01/29/2021 0.03 (L) 0.30 - 5.00 uIU/mL Final

## 2021-06-16 NOTE — PROGRESS NOTES
FOOT AND ANKLE SURGERY/PODIATRY Progress Note        ASSESSMENT:   Neuroma second intermetatarsal space right foot        TREATMENT:  The patient was given a second cortisone injection today at the level of the common digital nerve of the second toe right foot consisting of 1/4 cc of dexamethasone sodium phosphate and 1/4 cc of 2% lidocaine plain.  The patient tolerated the injection well.  I informed the patient that if these conservative measures fail I would recommend an x-ray of her right foot and possible MRI.  She may require surgical excision of the symptomatic neuroma.           HPI:Keyla Ni return to the clinic today with continued complaints of right  foot pain.  The patient was previously diagnosed with a neuroma of the second intermetatarsal space of the right foot.  The patient stated that she has a very sharp pain which radiates between the second and third toes of the right foot along the bottom of the foot and into the arch of her foot.  She stated that she did get some relief from her initial cortisone injection.  She has tried over-the-counter shoe inserts with very little relief.  The pain can be quite severe at times.      Past Medical History:   Diagnosis Date     Abnormal Pap smear of cervix 2014    nl since     Acne      Allergic rhinitis      Anemia      Anxiety      ASCUS with positive high risk HPV cervical 4/17/2013 2010, 2011 NIL paps 4/17/13 ASCUS, +HPV (unknown risk factor) 10/22/14 NIL 4/28/17 NIL pap, neg HPV 1/29/21 NIL pap, neg HPV. Routine screening     Major depressive disorder, recurrent episode, unspecified      Unspecified hypothyroidism        Past Surgical History:   Procedure Laterality Date     WISDOM TOOTH EXTRACTION  2011       Allergies   Allergen Reactions     Cat Hair Std Allergenic Ext      Grass      House Dust      Mold Extracts      Pollen      Trees      Venom-Honey Bee Swelling         Current Outpatient Medications:      azelastine (ASTELIN) 137 mcg  (0.1 %) nasal spray, 1 spray into each nostril 2 (two) times a day., Disp: 30 mL, Rfl: 0     cetirizine (ZYRTEC) 10 MG tablet, Take 10 mg by mouth daily., Disp: , Rfl:      cholecalciferol, vitamin D3, (VITAMIN D3) 25 mcg (1,000 unit) capsule, Take 1,000 Units by mouth daily., Disp: , Rfl:      escitalopram oxalate (LEXAPRO) 5 MG tablet, Take 1 tablet (5 mg total) by mouth daily., Disp: 90 tablet, Rfl: 3     famotidine (PEPCID) 20 MG tablet, Take 20 mg by mouth 2 (two) times a day., Disp: , Rfl:      L norgest/e.estradioL-e.estrad (SEASONIQUE) 0.15 mg-30 mcg (84)/10 mcg (7) 3MPk, Take 1 tablet by mouth daily., Disp: 1 Package, Rfl: 4     NP THYROID 60 mg Tab, TAKE 1 TABLET BY MOUTH DAILY. ON AN EMPTY STOMACH WITH SMALL SIP OF WATER AT LEAST 30 MINUTES PRIOR TO MEAL, Disp: 90 tablet, Rfl: 3     prenatal no115-iron-folic acid 29 mg iron- 1 mg Chew, Chew daily., Disp: , Rfl:      spironolactone (ALDACTONE) 50 MG tablet, TAKE 1 TABLET(50 MG) BY MOUTH TWICE DAILY, Disp: 180 tablet, Rfl: 1     traZODone (DESYREL) 50 MG tablet, TAKE 1 TABLET(50 MG) BY MOUTH AT BEDTIME, Disp: 90 tablet, Rfl: 3    Family History   Problem Relation Age of Onset     Asthma Brother      Breast cancer Maternal Aunt         dx age 61     Depression Father      Snoring Father      Depression Mother      Uterine cancer Mother      Lung cancer Maternal Grandmother      Cancer Maternal Grandmother         bladder     Thyroid disease Paternal Grandmother      Osteoporosis Paternal Grandmother      COPD Paternal Grandmother      Sleep apnea Sister        Social History     Socioeconomic History     Marital status: Single     Spouse name: JAMIE Barth     Number of children: 0     Years of education: Not on file     Highest education level: Not on file   Occupational History     Occupation: [employer]     Comment: [employer]   Social Needs     Financial resource strain: Not on file     Food insecurity     Worry: Not on file     Inability: Not on file      Transportation needs     Medical: Not on file     Non-medical: Not on file   Tobacco Use     Smoking status: Never Smoker     Smokeless tobacco: Never Used   Substance and Sexual Activity     Alcohol use: Yes     Alcohol/week: 3.0 standard drinks     Types: 3 Standard drinks or equivalent per week     Frequency: Monthly or less     Drinks per session: 1 or 2     Binge frequency: Less than monthly     Drug use: No     Sexual activity: Yes     Partners: Male     Birth control/protection: Pill   Lifestyle     Physical activity     Days per week: Not on file     Minutes per session: Not on file     Stress: Not on file   Relationships     Social connections     Talks on phone: Not on file     Gets together: Not on file     Attends Baptism service: Not on file     Active member of club or organization: Not on file     Attends meetings of clubs or organizations: Not on file     Relationship status: Not on file     Intimate partner violence     Fear of current or ex partner: Not on file     Emotionally abused: Not on file     Physically abused: Not on file     Forced sexual activity: Not on file   Other Topics Concern     Not on file   Social History Narrative     Not on file       10 point Review of Systems is negative      Vitals:    04/08/21 1059   BP: 122/70   Pulse: 64   Temp: 97.8  F (36.6  C)       BMI= There is no height or weight on file to calculate BMI.    OBJECTIVE:  General appearance: Patient is alert and fully cooperative with history & exam.  No sign of distress is noted during the visit.  Vascular: Dorsalis pedis and posterior tibial pulses are palpable. There is no pedal hair growth bilaterally.  CFT < 3 sec from anterior tibial surface to distal digits bilaterally. There is no appreciable edema noted.  Dermatologic: Turgor and texture are within normal limits. No coloration or temperature changes. No primary or secondary lesions noted.  Neurologic: All epicritic and proprioceptive sensations are grossly  intact bilaterally.  There is a positive Bryson sign second intermetatarsal space right foot.  Musculoskeletal: All active and passive ankle, subtalar, midtarsal, and 1st MPJ range of motion are grossly intact without pain or crepitus, with the exception of none. Manual muscle strength is within normal limits bilaterally. All dorsiflexors, plantarflexors, invertors, evertors are intact bilaterally. Tenderness present to the second intermetatarsal space right foot on palpation.  No tenderness to the right foot or ankle with range of motion. Calf is soft/non-tender without warmth/induration    Imaging:         No results found.           Roe Almeida; ALMITA  [facility] Foot & Ankle Surgery/Podiatry

## 2021-06-16 NOTE — TELEPHONE ENCOUNTER
Refill Approved    Rx renewed per Medication Renewal Policy. Medication was last renewed on 3/4/21.    Alex Farfan, [facility] Triage/Med Refill 4/2/2021     Requested Prescriptions   Pending Prescriptions Disp Refills     traZODone (DESYREL) 50 MG tablet [Pharmacy Med Name: TRAZODONE 50MG TABLETS] 30 tablet 0     Sig: TAKE 1 TABLET(50 MG) BY MOUTH AT BEDTIME       Tricyclics/Misc Antidepressant/Antianxiety Meds Refill Protocol Passed - 4/1/2021  3:20 PM        Passed - PCP or prescribing provider visit in last year     Last office visit with prescriber/PCP: 11/5/2020 Scarlett Diaz MD OR same dept: 11/5/2020 Scarlett Diaz MD OR same specialty: 12/29/2020 Alisha Landrum MD  Last physical: Visit date not found Last MTM visit: Visit date not found   Next visit within 3 mo: Visit date not found  Next physical within 3 mo: Visit date not found  Prescriber OR PCP: Scarlett Paz MD  Last diagnosis associated with med order: 1. Primary insomnia  - traZODone (DESYREL) 50 MG tablet [Pharmacy Med Name: TRAZODONE 50MG TABLETS]; TAKE 1 TABLET(50 MG) BY MOUTH AT BEDTIME  Dispense: 30 tablet; Refill: 0    If protocol passes may refill for 12 months if within 3 months of last provider visit (or a total of 15 months).

## 2021-06-19 NOTE — LETTER
Letter by Gene Ruth MD at      Author: Gene Ruth MD Service: -- Author Type: --    Filed:  Encounter Date: 7/5/2019 Status: (Other)         Keyla Ni  [address] [place] [id number] July 5, 2019         Dear Ms. Ni,    Below are the results from your recent visit:    Resulted Orders   Thyroid Stimulating Hormone (TSH)   Result Value Ref Range    TSH 1.86 0.30 - 5.00 uIU/mL       Keyla, your TSH level is now in the goal range at 1.86.  Decreasing the levothyroxine to 125 mcg seems to be where you need to stay.  It would be a good idea for you to make an appointment to be seen in about 3 months as that is when you are due for another physical exam.      Please call with questions or contact us using [employer].    Sincerely,        Electronically signed by Gene Ruth MD

## 2021-06-20 NOTE — PROGRESS NOTES
ASSESSMENT/PLAN:       1. Contraception    - norgestimate-ethinyl estradiol (ESTARYLLA) 0.25-35 mg-mcg per tablet; Take 1 tablet by mouth daily.  Dispense: 3 Package; Refill: 3  Should have a repeat pelvic and Pap smear and 2020    2. Hypothyroidism due to Hashimoto's thyroiditis    - levothyroxine (SYNTHROID, LEVOTHROID) 137 MCG tablet; Take 1 tablet (137 mcg total) by mouth daily.  Dispense: 90 tablet; Refill: 3  - Thyroid Stimulating Hormone (TSH)  - Comprehensive Metabolic Panel  My chart message to the patient with her test results  3. Acne vulgaris    - spironolactone (ALDACTONE) 50 MG tablet; Take 1 tablet (50 mg total) by mouth 2 (two) times a day.  Dispense: 180 tablet; Refill: 3  - Comprehensive Metabolic Panel  Okay to increase her spironolactone to 50 mg twice a day especially around her menstrual period but also would be fine to do that through the whole month if it seems to be helping her acne.  She states that there is not any other reason that she is taking the spironolactone.  She does not have any lightheadedness or dizziness and has not noticed any breast tenderness related to the use of the spironolactone    4. High risk medication use    - Comprehensive Metabolic Panel    5. Allergic rhinitis  Continue his Zyrtec    6.  Depression with anxiety  Her depression seems to be well-controlled per her PHQ 9 score and I think is fine that she is tapered off the sertraline.  We will continue to have hydroxyzine on hand if she is having some panic-like symptoms    25 minutes spent total with the patient with more than 50% of that time being spent in review of her medications side effects benefits and counseling in regard to ongoing care    The following are part of a depression follow up plan for the patient:  patient follow-up to return when and if necessary     ADDENDA: 10/1/2018 TSH low so will decrease levothyroxine strength to 125 mcg daily and repeat TSH in 3 months.    Gene Ruth,  MD      PROGRESS NOTE   10/1/2018    SUBJECTIVE:  Keyla Ni is a 28 y.o. female  who presents for   Chief Complaint   Patient presents with     Follow-up     med check    1. Contraception renewal  The patient would like her birth control pill to be renewed and she is not having any problems with that.  Her last Pap smear was just over a year ago and that was negative.  About 5 years ago she had a positive HPV test on her Pap smear but follow-up Pap smears were negative for HPV and had normal cytology.  Her periods are regular and light on the birth control pill and it may be helping her acne as well.    2. Hypothyroidism due to Hashimoto's thyroiditis  Would like to check her TSH today and she is been taking her thyroid medicine every day    3. Acne vulgaris  The patient notices that her acne flares up around her menstrual period and would like to know if it was okay to increase her spironolactone to 50 mg twice a day for about 7-10 days out of the month.  She actually has tried that on occasion and does feel that it helps her acne.    4. High risk medication use  Because of being on the spironolactone would like to check some blood work today    5. Allergic rhinitis  The patient has year-round allergy symptoms and uses Zyrtec on a daily basis    6.  Depression with anxiety  The patient feels that her symptoms are improved and rarely is having anxiety attacks.  When she does she will use some hydroxyzine.  She has gone off of her sertraline.    Patient Active Problem List   Diagnosis     Common Migraine (Without Aura)     Allergic Rhinitis     Insomnia     Hypothyroidism     Depression With Anxiety     Anemia     Acne       Current Outpatient Prescriptions   Medication Sig Dispense Refill     cetirizine (ZYRTEC) 10 MG tablet Take 10 mg by mouth daily.       hydrOXYzine HCl (ATARAX) 50 MG tablet Take 1 tablet (50 mg total) by mouth every 6 (six) hours as needed for itching. 60 tablet 0     levothyroxine  (SYNTHROID, LEVOTHROID) 125 MCG tablet Take 1 tablet (125 mcg total) by mouth every morning. On empty stomach 90 tablet 3     norgestimate-ethinyl estradiol (ESTARYLLA) 0.25-35 mg-mcg per tablet Take 1 tablet by mouth daily. 3 Package 3     spironolactone (ALDACTONE) 50 MG tablet TAKE 1 TABLET(50 MG) BY MOUTH DAILY 90 tablet 1     spironolactone (ALDACTONE) 50 MG tablet Take 1 tablet (50 mg total) by mouth 2 (two) times a day. 180 tablet 3     No current facility-administered medications for this visit.        History   Smoking Status     Never Smoker   Smokeless Tobacco     Never Used           OBJECTIVE:        Recent Results (from the past 240 hour(s))   Thyroid Stimulating Hormone (TSH)   Result Value Ref Range    TSH 0.19 (L) 0.30 - 5.00 uIU/mL   Comprehensive Metabolic Panel   Result Value Ref Range    Sodium 139 136 - 145 mmol/L    Potassium 4.1 3.5 - 5.0 mmol/L    Chloride 106 98 - 107 mmol/L    CO2 25 22 - 31 mmol/L    Anion Gap, Calculation 8 5 - 18 mmol/L    Glucose 90 70 - 125 mg/dL    BUN 8 8 - 22 mg/dL    Creatinine 0.79 0.60 - 1.10 mg/dL    GFR MDRD Af Amer >60 >60 mL/min/1.73m2    GFR MDRD Non Af Amer >60 >60 mL/min/1.73m2    Bilirubin, Total 0.4 0.0 - 1.0 mg/dL    Calcium 9.4 8.5 - 10.5 mg/dL    Protein, Total 6.5 6.0 - 8.0 g/dL    Albumin 3.8 3.5 - 5.0 g/dL    Alkaline Phosphatase 34 (L) 45 - 120 U/L    AST 13 0 - 40 U/L    ALT 17 0 - 45 U/L       Vitals:    09/28/18 1308   BP: 110/68   Patient Position: Sitting   Cuff Size: Adult Regular   Pulse: 75   SpO2: 98%   Weight: 157 lb 1 oz (71.2 kg)     Weight: 157 lb 1 oz (71.2 kg)        Physical Exam:  GENERAL APPEARANCE: 28-year-old female, NAD, well hydrated, well nourished  SKIN:  Normal skin turgor, mild facial acne  Mental status: PHQ 9 score is 4.  The patient feels she is doing well with her mood.  Casually dressed and well groomed with good eye contact and normal speech pattern without any psychomotor agitation or depressed mood.  No suicidal  thoughts.

## 2021-06-29 ENCOUNTER — AMBULATORY - HEALTHEAST (OUTPATIENT)
Dept: PODIATRY | Facility: CLINIC | Age: 31
End: 2021-06-29

## 2021-07-03 NOTE — ADDENDUM NOTE
Addendum Note by Scarlett Johnson MD at 1/30/2018  1:17 PM     Author: Scarlett Jhonson MD Service: -- Author Type: Physician    Filed: 1/30/2018  1:17 PM Encounter Date: 1/30/2018 Status: Signed    : Scarlett Johnson MD (Physician)    Addended by: SCARLETT JOHNSON on: 1/30/2018 01:17 PM        Modules accepted: Orders

## 2021-07-03 NOTE — ADDENDUM NOTE
Addendum Note by Gene Ruth MD at 10/1/2018 11:51 AM     Author: Gene Ruth MD Service: -- Author Type: Physician    Filed: 10/1/2018 11:51 AM Encounter Date: 9/28/2018 Status: Signed    : Gene Ruth MD (Physician)    Addended by: GENE RUTH on: 10/1/2018 11:51 AM        Modules accepted: Orders

## 2021-07-14 ENCOUNTER — OFFICE VISIT (OUTPATIENT)
Dept: PODIATRY | Facility: CLINIC | Age: 31
End: 2021-07-14
Payer: COMMERCIAL

## 2021-07-14 VITALS — BODY MASS INDEX: 26.33 KG/M2 | WEIGHT: 158 LBS | RESPIRATION RATE: 16 BRPM | HEIGHT: 65 IN | HEART RATE: 76 BPM

## 2021-07-14 DIAGNOSIS — G58.8 NEUROMA DIGITAL NERVE: Primary | ICD-10-CM

## 2021-07-14 PROCEDURE — 99214 OFFICE O/P EST MOD 30 MIN: CPT | Performed by: PODIATRIST

## 2021-07-14 RX ORDER — LEVOTHYROXINE SODIUM 125 UG/1
TABLET ORAL
COMMUNITY
Start: 2020-09-23 | End: 2021-10-06

## 2021-07-14 ASSESSMENT — MIFFLIN-ST. JEOR: SCORE: 1432.56

## 2021-07-14 ASSESSMENT — PAIN SCALES - GENERAL: PAINLEVEL: MILD PAIN (2)

## 2021-07-14 NOTE — PROGRESS NOTES
FOOT AND ANKLE SURGERY/PODIATRY Progress Note        ASSESSMENT:   Neuroma second intermetatarsal space right foot    HPI: Keyla Ni was seen again today with continued complaints of severe shooting and burning pain extending from between the second and third toes of the right foot along the bottom of the foot and behind the right ankle.  The patient has been previously diagnosed with a digital neuroma.  She was given cortisone injection.  She stated that her pain had improved for least 2 weeks.  The pain is gradually returned.  She is finding it difficult to weight-bear and ambulate without pain.      Past Medical History:   Diagnosis Date     Abnormal Pap smear of cervix 2014    nl since     Acne      Allergic rhinitis      Anemia      Anxiety      ASCUS with positive high risk HPV cervical 4/17/2013 2010, 2011 NIL paps 4/17/13 ASCUS, +HPV (unknown risk factor) 10/22/14 NIL 4/28/17 NIL pap, neg HPV 1/29/21 NIL pap, neg HPV. Routine screening     Major depressive disorder, recurrent episode, unspecified      Unspecified hypothyroidism         Past Surgical History:   Procedure Laterality Date     WISDOM TOOTH EXTRACTION  2011       Allergies   Allergen Reactions     Cat Hair Std Allergenic Ext [Cat Hair Extract] Unknown     Grass Unknown     House Dust [Dust Mite Extract] Unknown     Mold Extracts [Molds & Smuts] Unknown     Other Environmental Allergy      Pollen [Pollen Extract] Unknown     Trees Unknown     Venom-Honey Bee [Bee Venom] Swelling         Current Outpatient Medications:      azelastine (ASTELIN) 137 mcg (0.1 %) nasal spray, [AZELASTINE (ASTELIN) 137 MCG (0.1 %) NASAL SPRAY] 1 spray into each nostril 2 (two) times a day., Disp: 30 mL, Rfl: 0     cetirizine (ZYRTEC) 10 MG tablet, [CETIRIZINE (ZYRTEC) 10 MG TABLET] Take 10 mg by mouth daily., Disp: , Rfl:      cholecalciferol, vitamin D3, (VITAMIN D3) 25 mcg (1,000 unit) capsule, [CHOLECALCIFEROL, VITAMIN D3, (VITAMIN D3) 25 MCG (1,000 UNIT)  CAPSULE] Take 1,000 Units by mouth daily., Disp: , Rfl:      escitalopram oxalate (LEXAPRO) 5 MG tablet, [ESCITALOPRAM OXALATE (LEXAPRO) 5 MG TABLET] Take 1 tablet (5 mg total) by mouth daily., Disp: 90 tablet, Rfl: 3     famotidine (PEPCID) 20 MG tablet, [FAMOTIDINE (PEPCID) 20 MG TABLET] Take 20 mg by mouth 2 (two) times a day., Disp: , Rfl:      L norgest/e.estradioL-e.estrad (SEASONIQUE) 0.15 mg-30 mcg (84)/10 mcg (7) 3MPk, [L NORGEST/E.ESTRADIOL-E.ESTRAD (SEASONIQUE) 0.15 MG-30 MCG (84)/10 MCG (7) 3MPK] Take 1 tablet by mouth daily., Disp: 1 Package, Rfl: 4     levothyroxine (SYNTHROID/LEVOTHROID) 125 MCG tablet, TK 1 T PO QAM OES, Disp: , Rfl:      NP THYROID 60 mg Tab, [NP THYROID 60 MG TAB] TAKE 1 TABLET BY MOUTH DAILY. ON AN EMPTY STOMACH WITH SMALL SIP OF WATER AT LEAST 30 MINUTES PRIOR TO MEAL, Disp: 90 tablet, Rfl: 3     prenatal no115-iron-folic acid 29 mg iron- 1 mg Chew, [PRENATAL -IRON-FOLIC ACID 29 MG IRON- 1 MG CHEW] Chew daily., Disp: , Rfl:      spironolactone (ALDACTONE) 50 MG tablet, [SPIRONOLACTONE (ALDACTONE) 50 MG TABLET] TAKE 1 TABLET(50 MG) BY MOUTH TWICE DAILY, Disp: 180 tablet, Rfl: 1     traZODone (DESYREL) 50 MG tablet, [TRAZODONE (DESYREL) 50 MG TABLET] TAKE 1 TABLET(50 MG) BY MOUTH AT BEDTIME, Disp: 90 tablet, Rfl: 3    Family History   Problem Relation Age of Onset     Asthma Brother      Breast Cancer Maternal Aunt         dx age 61     Depression Father      Snoring Father      Depression Mother      Uterine Cancer Mother      Lung Cancer Maternal Grandmother      Cancer Maternal Grandmother         bladder     Thyroid Disease Paternal Grandmother      Osteoporosis Paternal Grandmother      Chronic Obstructive Pulmonary Disease Paternal Grandmother      Sleep Apnea Sister        Social History     Socioeconomic History     Marital status: Single     Spouse name: Not on file     Number of children: 0     Years of education: Not on file     Highest education level: Not on file  "  Occupational History     Not on file   Tobacco Use     Smoking status: Never Smoker     Smokeless tobacco: Never Used   Substance and Sexual Activity     Alcohol use: Yes     Alcohol/week: 3.0 standard drinks     Drug use: No     Sexual activity: Yes     Partners: Male     Birth control/protection: Pill   Other Topics Concern     Not on file   Social History Narrative     Not on file     Social Determinants of Health     Financial Resource Strain:      Difficulty of Paying Living Expenses:    Food Insecurity:      Worried About Running Out of Food in the Last Year:      Ran Out of Food in the Last Year:    Transportation Needs:      Lack of Transportation (Medical):      Lack of Transportation (Non-Medical):    Physical Activity:      Days of Exercise per Week:      Minutes of Exercise per Session:    Stress:      Feeling of Stress :    Social Connections:      Frequency of Communication with Friends and Family:      Frequency of Social Gatherings with Friends and Family:      Attends Congregation Services:      Active Member of Clubs or Organizations:      Attends Club or Organization Meetings:      Marital Status:    Intimate Partner Violence:      Fear of Current or Ex-Partner:      Emotionally Abused:      Physically Abused:      Sexually Abused:        10 point Review of Systems is negative      Pulse 76   Resp 16   Ht 1.651 m (5' 5\")   Wt 71.7 kg (158 lb)   BMI 26.29 kg/m      BMI= Body mass index is 26.29 kg/m .    OBJECTIVE:  General appearance: Patient is alert and fully cooperative with history & exam.  No sign of distress is noted during the visit.  Vascular: Dorsalis pedis and posterior tibial pulses are palpable. There is pedal hair growth bilaterally.  CFT < 3 sec from anterior tibial surface to distal digits bilaterally. There is no appreciable edema noted.  Dermatologic: Turgor and texture are within normal limits. No coloration or temperature changes. No primary or secondary lesions " noted.  Neurologic: All epicritic and proprioceptive sensations are grossly intact bilaterally.  There is pain to palpation of the second intermetatarsal space right foot.  There is a negative Bryson sign noted second intermetatarsal space right foot.  Musculoskeletal: All active and passive ankle, subtalar, midtarsal, and 1st MPJ range of motion are grossly intact without pain or crepitus, with the exception of none. Manual muscle strength is within normal limits bilaterally. All dorsiflexors, plantarflexors, invertors, evertors are intact bilaterally. Tenderness present to the second intermetatarsal space right foot on palpation.  No tenderness to the right foot or ankle with range of motion. Calf is soft/non-tender without warmth/induration    Imaging:         No results found.         TREATMENT:  I informed the patient I would recommend surgical excision of the neuroma and attempt to alleviate her pain.  She was told that as result of this procedure she would have some residual numbness between the second and third toes of her right foot.  I told the patient that if the nerve is not of significant size I may elect to just decompress the digital nerve.  That would spare the nerve and she would maintain all sensation.  The patient was told that the procedure is performed on an outpatient basis under local anesthesia with IV sedation.  She was told the procedure takes approximately 10 minutes to perform.  She was asked to see her primary care physician for preoperative consultation.  She was also asked to go n.p.o. at midnight prior to the procedure.  All pertinent questions were invited and answered.  The patient felt well-informed and decided to move forward with the surgical treatment plan.         Roe Almeida; ALMITA  [facility] Foot & Ankle Surgery/Podiatry

## 2021-07-14 NOTE — LETTER
7/14/2021         RE: Keyla Ni  [address] [address]        Dear Colleague,    Thank you for referring your patient, Keyla Ni, to the [facility]. Please see a copy of my visit note below.    FOOT AND ANKLE SURGERY/PODIATRY Progress Note        ASSESSMENT:   Neuroma second intermetatarsal space right foot    HPI: Keyla Ni was seen again today with continued complaints of severe shooting and burning pain extending from between the second and third toes of the right foot along the bottom of the foot and behind the right ankle.  The patient has been previously diagnosed with a digital neuroma.  She was given cortisone injection.  She stated that her pain had improved for least 2 weeks.  The pain is gradually returned.  She is finding it difficult to weight-bear and ambulate without pain.      Past Medical History:   Diagnosis Date     Abnormal Pap smear of cervix 2014    nl since     Acne      Allergic rhinitis      Anemia      Anxiety      ASCUS with positive high risk HPV cervical 4/17/2013 2010, 2011 NIL paps 4/17/13 ASCUS, +HPV (unknown risk factor) 10/22/14 NIL 4/28/17 NIL pap, neg HPV 1/29/21 NIL pap, neg HPV. Routine screening     Major depressive disorder, recurrent episode, unspecified      Unspecified hypothyroidism         Past Surgical History:   Procedure Laterality Date     WISDOM TOOTH EXTRACTION  2011       Allergies   Allergen Reactions     Cat Hair Std Allergenic Ext [Cat Hair Extract] Unknown     Grass Unknown     House Dust [Dust Mite Extract] Unknown     Mold Extracts [Molds & Smuts] Unknown     Other Environmental Allergy      Pollen [Pollen Extract] Unknown     Trees Unknown     Venom-Honey Bee [Bee Venom] Swelling         Current Outpatient Medications:      azelastine (ASTELIN) 137 mcg (0.1 %) nasal spray, [AZELASTINE (ASTELIN) 137 MCG (0.1 %) NASAL SPRAY] 1 spray into each nostril 2 (two) times a day., Disp: 30  mL, Rfl: 0     cetirizine (ZYRTEC) 10 MG tablet, [CETIRIZINE (ZYRTEC) 10 MG TABLET] Take 10 mg by mouth daily., Disp: , Rfl:      cholecalciferol, vitamin D3, (VITAMIN D3) 25 mcg (1,000 unit) capsule, [CHOLECALCIFEROL, VITAMIN D3, (VITAMIN D3) 25 MCG (1,000 UNIT) CAPSULE] Take 1,000 Units by mouth daily., Disp: , Rfl:      escitalopram oxalate (LEXAPRO) 5 MG tablet, [ESCITALOPRAM OXALATE (LEXAPRO) 5 MG TABLET] Take 1 tablet (5 mg total) by mouth daily., Disp: 90 tablet, Rfl: 3     famotidine (PEPCID) 20 MG tablet, [FAMOTIDINE (PEPCID) 20 MG TABLET] Take 20 mg by mouth 2 (two) times a day., Disp: , Rfl:      L norgest/e.estradioL-e.estrad (SEASONIQUE) 0.15 mg-30 mcg (84)/10 mcg (7) 3MPk, [L NORGEST/E.ESTRADIOL-E.ESTRAD (SEASONIQUE) 0.15 MG-30 MCG (84)/10 MCG (7) 3MPK] Take 1 tablet by mouth daily., Disp: 1 Package, Rfl: 4     levothyroxine (SYNTHROID/LEVOTHROID) 125 MCG tablet, TK 1 T PO QAM OES, Disp: , Rfl:      NP THYROID 60 mg Tab, [NP THYROID 60 MG TAB] TAKE 1 TABLET BY MOUTH DAILY. ON AN EMPTY STOMACH WITH SMALL SIP OF WATER AT LEAST 30 MINUTES PRIOR TO MEAL, Disp: 90 tablet, Rfl: 3     prenatal no115-iron-folic acid 29 mg iron- 1 mg Chew, [PRENATAL -IRON-FOLIC ACID 29 MG IRON- 1 MG CHEW] Chew daily., Disp: , Rfl:      spironolactone (ALDACTONE) 50 MG tablet, [SPIRONOLACTONE (ALDACTONE) 50 MG TABLET] TAKE 1 TABLET(50 MG) BY MOUTH TWICE DAILY, Disp: 180 tablet, Rfl: 1     traZODone (DESYREL) 50 MG tablet, [TRAZODONE (DESYREL) 50 MG TABLET] TAKE 1 TABLET(50 MG) BY MOUTH AT BEDTIME, Disp: 90 tablet, Rfl: 3    Family History   Problem Relation Age of Onset     Asthma Brother      Breast Cancer Maternal Aunt         dx age 61     Depression Father      Snoring Father      Depression Mother      Uterine Cancer Mother      Lung Cancer Maternal Grandmother      Cancer Maternal Grandmother         bladder     Thyroid Disease Paternal Grandmother      Osteoporosis Paternal Grandmother      Chronic Obstructive  "Pulmonary Disease Paternal Grandmother      Sleep Apnea Sister        Social History     Socioeconomic History     Marital status: Single     Spouse name: Not on file     Number of children: 0     Years of education: Not on file     Highest education level: Not on file   Occupational History     Not on file   Tobacco Use     Smoking status: Never Smoker     Smokeless tobacco: Never Used   Substance and Sexual Activity     Alcohol use: Yes     Alcohol/week: 3.0 standard drinks     Drug use: No     Sexual activity: Yes     Partners: Male     Birth control/protection: Pill   Other Topics Concern     Not on file   Social History Narrative     Not on file     Social Determinants of Health     Financial Resource Strain:      Difficulty of Paying Living Expenses:    Food Insecurity:      Worried About Running Out of Food in the Last Year:      Ran Out of Food in the Last Year:    Transportation Needs:      Lack of Transportation (Medical):      Lack of Transportation (Non-Medical):    Physical Activity:      Days of Exercise per Week:      Minutes of Exercise per Session:    Stress:      Feeling of Stress :    Social Connections:      Frequency of Communication with Friends and Family:      Frequency of Social Gatherings with Friends and Family:      Attends Hinduism Services:      Active Member of Clubs or Organizations:      Attends Club or Organization Meetings:      Marital Status:    Intimate Partner Violence:      Fear of Current or Ex-Partner:      Emotionally Abused:      Physically Abused:      Sexually Abused:        10 point Review of Systems is negative      Pulse 76   Resp 16   Ht 1.651 m (5' 5\")   Wt 71.7 kg (158 lb)   BMI 26.29 kg/m      BMI= Body mass index is 26.29 kg/m .    OBJECTIVE:  General appearance: Patient is alert and fully cooperative with history & exam.  No sign of distress is noted during the visit.  Vascular: Dorsalis pedis and posterior tibial pulses are palpable. There is pedal hair " growth bilaterally.  CFT < 3 sec from anterior tibial surface to distal digits bilaterally. There is no appreciable edema noted.  Dermatologic: Turgor and texture are within normal limits. No coloration or temperature changes. No primary or secondary lesions noted.  Neurologic: All epicritic and proprioceptive sensations are grossly intact bilaterally.  There is pain to palpation of the second intermetatarsal space right foot.  There is a negative Bryson sign noted second intermetatarsal space right foot.  Musculoskeletal: All active and passive ankle, subtalar, midtarsal, and 1st MPJ range of motion are grossly intact without pain or crepitus, with the exception of none. Manual muscle strength is within normal limits bilaterally. All dorsiflexors, plantarflexors, invertors, evertors are intact bilaterally. Tenderness present to the second intermetatarsal space right foot on palpation.  No tenderness to the right foot or ankle with range of motion. Calf is soft/non-tender without warmth/induration    Imaging:         No results found.         TREATMENT:  I informed the patient I would recommend surgical excision of the neuroma and attempt to alleviate her pain.  She was told that as result of this procedure she would have some residual numbness between the second and third toes of her right foot.  I told the patient that if the nerve is not of significant size I may elect to just decompress the digital nerve.  That would spare the nerve and she would maintain all sensation.  The patient was told that the procedure is performed on an outpatient basis under local anesthesia with IV sedation.  She was told the procedure takes approximately 10 minutes to perform.  She was asked to see her primary care physician for preoperative consultation.  She was also asked to go n.p.o. at midnight prior to the procedure.  All pertinent questions were invited and answered.  The patient felt well-informed and decided to move forward  with the surgical treatment plan.         Roe Almeida; ALMITA  [facility] Foot & Ankle Surgery/Podiatry         Again, thank you for allowing me to participate in the care of your patient.        Sincerely,        Roe Almeida DPM

## 2021-07-14 NOTE — PATIENT INSTRUCTIONS
Surgery Information    **ALL [facility] PAPERWORK IS TO BE FAXED -[phone], IT WILL BE PROCESSED AFTER SURGERY IS COMPLETE.      Surgery Date     Surgery Time   ( Arrive at the hospital two hours prior to your surgery and 1 hour prior at the surgery center. Check in at the . The only exception is if you are scheduled for surgery at 7am, you should arrive at 5:30am)    IF YOU NEED TO RESCHEDULE OR CANCEL YOUR SURGERY FOR ANY REASON PLEASE CALL THE CLINIC AS SOON AS POSSIBLE -[phone].    Admission Type: Outpatient    Surgeon: Dr. Almeida    Surgery Procedure: Excision Neuroma     Surgery Location: [facility], [address] [place] [address], [address]    If you take Blood Thinners Such as:  Warfarin, Xarelto, Plavix, Aspirin or Eliquis this will need to be HELD prior to procedure according to primary care provider/doctor or cardiology who prescribes this medication. Generally this is 5 days.    Additional Information: If you use a CPAP machine for sleep apnea please bring it with you for surgery. We will want to monitor your breathing using your normal equipment.   If you need to reach the surgery scheduler please call the main number at [phone] and ask for Anais for Dr. Almeida    [facility] INFORMATION    We need to know a lot of information about you before surgery.     1-5 Days Before:     A nurse will call you for a pre-screening interview. The phone call with the nurse will be much faster and easier if you  Have organized your information prior to the call.     Please have the following information available:    Preoperative Exam completed and faxed to our office    Primary care provider's and any specialty providers' contact information available. .    If requested by your primary care provider, have any heart and lung exams at least 3 days before surgery.    Have a complete and accurate list of medications available.     Have a list of your allergies/sensitivities  and reactions    Know your health history, surgical history, and medical problems    Know any anesthesia issues with you or within your family.     BE SURE TO NOTIFY US IF YOU ARE TAKING ANY BLOOD THINNING AGENTS: Coumadin, Plavix, Aspirin, Xarelto, Eliquis    Someone planned to bring you and stay with you after the surgery    Day Before Surgery    1. STOP Smoking and Drinking: It is important to stop smoking and drinking at least 24 hours before surgery. Smoking and drinking may cause complications in your recovery from anesthesia and may lengthen the healing process.    2. Pack for your hospital stay: If it will be required for you to stay at the hospital after surgery, bring personal items such as a robe, slippers, pajamas, additional clothing and toiletries. Don't forget:    List of medication    Eyeglass case or contact case with solution. You cannot wear contacts during surgery    Copies of your physical exam , lab results and EKG    Copy of Health Care Directives, Living Will or Power of     Insurance Cards    Photo ID    CPAP machine    3. NOTHING BY MOUTH 8 HOURS BEFORE. This includes gum, hard candy, water and mints. The only exception is if you have been instructed by your doctor to take your medications with sips of water. You may rinse your mouth or brush your teeth, but do not swallow water.     4. Remove Nail Polish  Day of Surgery    1. Medications- Take as indicated with sips of water     2. Wear comfortable loose fitting clothes. Wear your glasses-Not contacts. Do not wear jewelry and remove body piercing's. Surgery may be cancelled if they are not removed.     3. If same day surgery-Have a someone come with you to surgery that can help you understand the surgeon's instructions, drive you home and stay with you overnight the first night.     4. A nurse will call you at home within 72 hours following surgery to see how you are doing. Our nurses and doctors will discuss recovery with  you.      The surgery scheduler Anais for Dr. Almeida will contact you to schedule if you were not scheduled today or you need to make any adjustments to your surgery.     You will need a preop physical within 30 days before surgery with your primary care provider. Please note if you do not get a complete History and Physical your surgery will be cancelled.   Please contact your primary to schedule.     A nurse should contact you from the hospital 1-2 days before surgery to review with you.    If you would like a Good Irene Estimate for your upcoming service/procedure contact Cost of Care Estimates at [phone], advocates are available Monday through Friday 8am - 5pm. You may also submit a request online at [email or web address]    [facility]  [address]  [place]  [address]     [phone] for facility charge    Anesthesiology charge  [phone]          Please don't hesitate to call us with any additional question or problems  Our number is [phone]     Instructions for Patients Scheduled for Vascular or Podiatry Procedures During the COVID 19 Pandemic    Your Provider has determined that your condition warrants going ahead with your procedure at this time.  You will need to be tested for COVID19 within 72 hours of your procedure. We highly encourage patients to get tested for COVID-19 at one of our designated [facility] testing sites. We process the tests in our lab, which allows us to get the results quickly. If you choose to get tested at a non-[facility] location, you will need to contact your primary care provider to make those arrangements and ensure the results are available to your surgeon before you are arriving for your procedure. If we do not receive the results in time, your procedure may be postponed or canceled.  Please make sure your test is collected 3-days prior to your procedure date.  The  results will need to get faxed to [phone].  After testing, you will need to remain in self-quarantine until your procedure.  If you are notified of a positive COVID-19 result; you will need to call your provider for further recommendations.    Please call [phone] and press option 2 to speak to a nurse.  If the test is positive; DO NOT PRESENT FOR YOUR PROCEDURE until you have been given further instructions.  You will not be called with negative results so arrive as instructed unless otherwise notified.  Don't:    Don't invite visitors or friends into your home.    Don't leave your home unless absolutely necessary.    Don't share utensils and other household items with others in the home.  Do:    Wash your hands regularly with soap and water and use hand  with at least 60% alcohol if you don't have easy access to soap and water.     Disinfect surface areas daily, including doorknobs, electronics - especially phones, laptops and other devices.     Wash utensils and other items thoroughly.     Separate yourself from others in the household as best you can, including pets.    Keep your hands away from your face.     Practice all other prevention tips the CDC recommends, including covering your coughs and sneezes with a tissue or your sleeve and immediately throwing the tissue into the trash and washing your hands.    Call your hospital if you develop the following signs before your surgery:    Fever.    Cough.    Shortness of breath.    Sore throat.    Runny or stuffy nose.    Muscle or body aches.    Headaches.    Fatigue.    Vomiting and diarrhea.    These steps will help keep you & your family, other patients, and hospital staff safe from COVID19.

## 2021-07-20 DIAGNOSIS — L70.0 ACNE VULGARIS: ICD-10-CM

## 2021-07-20 PROBLEM — G58.8 NEUROMA DIGITAL NERVE: Status: ACTIVE | Noted: 2021-07-20

## 2021-07-24 DIAGNOSIS — Z11.59 ENCOUNTER FOR SCREENING FOR OTHER VIRAL DISEASES: ICD-10-CM

## 2021-07-25 RX ORDER — SPIRONOLACTONE 50 MG/1
TABLET, FILM COATED ORAL
Qty: 180 TABLET | Refills: 1 | Status: SHIPPED | OUTPATIENT
Start: 2021-07-25 | End: 2022-02-18

## 2021-07-25 NOTE — TELEPHONE ENCOUNTER
"Last Written Prescription Date 1/29/21  Last Fill Quantity: 180,  # refills: 1   Last office visit provider:  3/4/21     Requested Prescriptions   Pending Prescriptions Disp Refills     spironolactone (ALDACTONE) 50 MG tablet [Pharmacy Med Name: SPIRONOLACTONE 50MG TABLETS] 180 tablet 1     Sig: TAKE 1 TABLET(50 MG) BY MOUTH TWICE DAILY       Diuretics (Including Combos) Protocol Passed - 7/20/2021 11:32 AM        Passed - Blood pressure under 140/90 in past 12 months     BP Readings from Last 3 Encounters:   04/08/21 122/70   01/29/21 122/64   12/29/20 115/60                 Passed - Recent (12 mo) or future (30 days) visit within the authorizing provider's specialty     Patient has had an office visit with the authorizing provider or a provider within the authorizing providers department within the previous 12 mos or has a future within next 30 days. See \"Patient Info\" tab in inbasket, or \"Choose Columns\" in Meds & Orders section of the refill encounter.              Passed - Medication is active on med list        Passed - Patient is age 18 or older        Passed - No active pregancy on record        Passed - Normal serum creatinine on file in past 12 months     Recent Labs   Lab Test 01/29/21  0933   CR 0.80              Passed - Normal serum potassium on file in past 12 months     Recent Labs   Lab Test 01/29/21  0933   POTASSIUM 3.9                    Passed - Normal serum sodium on file in past 12 months     Recent Labs   Lab Test 01/29/21  0933                 Passed - No positive pregnancy test in past 12 months             allen prakash RN 07/25/21 7:03 AM  "

## 2021-09-23 ENCOUNTER — TELEPHONE (OUTPATIENT)
Dept: PODIATRY | Facility: CLINIC | Age: 31
End: 2021-09-23

## 2021-09-23 NOTE — TELEPHONE ENCOUNTER
----- Message from Anais Gutierrez sent at 7/20/2021  5:13 PM CDT -----  Regarding: 10/18/21 surgery  Call to confirm pre-op px and COVID test scheduled for 10/18/21 surgery.

## 2021-10-06 ENCOUNTER — OFFICE VISIT (OUTPATIENT)
Dept: FAMILY MEDICINE | Facility: CLINIC | Age: 31
End: 2021-10-06
Payer: COMMERCIAL

## 2021-10-06 VITALS
HEIGHT: 65 IN | TEMPERATURE: 97.6 F | SYSTOLIC BLOOD PRESSURE: 106 MMHG | DIASTOLIC BLOOD PRESSURE: 56 MMHG | WEIGHT: 169.8 LBS | OXYGEN SATURATION: 92 % | BODY MASS INDEX: 28.29 KG/M2 | HEART RATE: 78 BPM

## 2021-10-06 DIAGNOSIS — K21.9 GASTROESOPHAGEAL REFLUX DISEASE WITHOUT ESOPHAGITIS: ICD-10-CM

## 2021-10-06 DIAGNOSIS — E03.9 ACQUIRED HYPOTHYROIDISM: ICD-10-CM

## 2021-10-06 DIAGNOSIS — G47.00 INSOMNIA, UNSPECIFIED TYPE: ICD-10-CM

## 2021-10-06 DIAGNOSIS — Z01.818 PREOPERATIVE EXAMINATION: Primary | ICD-10-CM

## 2021-10-06 DIAGNOSIS — J30.9 ALLERGIC RHINITIS, UNSPECIFIED SEASONALITY, UNSPECIFIED TRIGGER: ICD-10-CM

## 2021-10-06 DIAGNOSIS — L70.9 ACNE, UNSPECIFIED ACNE TYPE: ICD-10-CM

## 2021-10-06 DIAGNOSIS — F34.1 DYSTHYMIC DISORDER: ICD-10-CM

## 2021-10-06 DIAGNOSIS — G43.009 MIGRAINE WITHOUT AURA AND WITHOUT STATUS MIGRAINOSUS, NOT INTRACTABLE: ICD-10-CM

## 2021-10-06 DIAGNOSIS — G58.8 NEUROMA DIGITAL NERVE: ICD-10-CM

## 2021-10-06 DIAGNOSIS — R82.90 ABNORMAL URINALYSIS: ICD-10-CM

## 2021-10-06 LAB
ALBUMIN SERPL-MCNC: 3.7 G/DL (ref 3.5–5)
ALBUMIN UR-MCNC: NEGATIVE MG/DL
ALP SERPL-CCNC: 36 U/L (ref 45–120)
ALT SERPL W P-5'-P-CCNC: 30 U/L (ref 0–45)
ANION GAP SERPL CALCULATED.3IONS-SCNC: 8 MMOL/L (ref 5–18)
APPEARANCE UR: CLEAR
AST SERPL W P-5'-P-CCNC: 21 U/L (ref 0–40)
BACTERIA #/AREA URNS HPF: ABNORMAL /HPF
BILIRUB SERPL-MCNC: 0.5 MG/DL (ref 0–1)
BILIRUB UR QL STRIP: NEGATIVE
BUN SERPL-MCNC: 8 MG/DL (ref 8–22)
CALCIUM SERPL-MCNC: 9.5 MG/DL (ref 8.5–10.5)
CHLORIDE BLD-SCNC: 106 MMOL/L (ref 98–107)
CO2 SERPL-SCNC: 25 MMOL/L (ref 22–31)
COLOR UR AUTO: YELLOW
CREAT SERPL-MCNC: 0.83 MG/DL (ref 0.6–1.1)
GFR SERPL CREATININE-BSD FRML MDRD: >90 ML/MIN/1.73M2
GLUCOSE BLD-MCNC: 74 MG/DL (ref 70–125)
GLUCOSE UR STRIP-MCNC: NEGATIVE MG/DL
HCG UR QL: NEGATIVE
HGB BLD-MCNC: 13.7 G/DL (ref 11.7–15.7)
HGB UR QL STRIP: ABNORMAL
KETONES UR STRIP-MCNC: NEGATIVE MG/DL
LEUKOCYTE ESTERASE UR QL STRIP: ABNORMAL
NITRATE UR QL: NEGATIVE
PH UR STRIP: 6.5 [PH] (ref 5–8)
POTASSIUM BLD-SCNC: 4.3 MMOL/L (ref 3.5–5)
PROT SERPL-MCNC: 6.7 G/DL (ref 6–8)
RBC #/AREA URNS AUTO: ABNORMAL /HPF
SODIUM SERPL-SCNC: 139 MMOL/L (ref 136–145)
SP GR UR STRIP: 1.01 (ref 1–1.03)
SQUAMOUS #/AREA URNS AUTO: ABNORMAL /LPF
TSH SERPL DL<=0.005 MIU/L-ACNC: 13.88 UIU/ML (ref 0.3–5)
UROBILINOGEN UR STRIP-ACNC: 0.2 E.U./DL
WBC #/AREA URNS AUTO: ABNORMAL /HPF

## 2021-10-06 PROCEDURE — 84443 ASSAY THYROID STIM HORMONE: CPT | Performed by: FAMILY MEDICINE

## 2021-10-06 PROCEDURE — 99214 OFFICE O/P EST MOD 30 MIN: CPT | Performed by: FAMILY MEDICINE

## 2021-10-06 PROCEDURE — 81001 URINALYSIS AUTO W/SCOPE: CPT | Performed by: FAMILY MEDICINE

## 2021-10-06 PROCEDURE — 85018 HEMOGLOBIN: CPT | Performed by: FAMILY MEDICINE

## 2021-10-06 PROCEDURE — 36415 COLL VENOUS BLD VENIPUNCTURE: CPT | Performed by: FAMILY MEDICINE

## 2021-10-06 PROCEDURE — 81025 URINE PREGNANCY TEST: CPT | Performed by: FAMILY MEDICINE

## 2021-10-06 PROCEDURE — 80053 COMPREHEN METABOLIC PANEL: CPT | Performed by: FAMILY MEDICINE

## 2021-10-06 PROCEDURE — 84439 ASSAY OF FREE THYROXINE: CPT | Performed by: FAMILY MEDICINE

## 2021-10-06 RX ORDER — ESCITALOPRAM OXALATE 10 MG/1
10 TABLET ORAL DAILY
Qty: 90 TABLET | Refills: 0 | Status: SHIPPED | OUTPATIENT
Start: 2021-10-06 | End: 2022-12-30

## 2021-10-06 ASSESSMENT — ANXIETY QUESTIONNAIRES
7. FEELING AFRAID AS IF SOMETHING AWFUL MIGHT HAPPEN: NOT AT ALL
1. FEELING NERVOUS, ANXIOUS, OR ON EDGE: MORE THAN HALF THE DAYS
IF YOU CHECKED OFF ANY PROBLEMS ON THIS QUESTIONNAIRE, HOW DIFFICULT HAVE THESE PROBLEMS MADE IT FOR YOU TO DO YOUR WORK, TAKE CARE OF THINGS AT HOME, OR GET ALONG WITH OTHER PEOPLE: SOMEWHAT DIFFICULT
GAD7 TOTAL SCORE: 10
3. WORRYING TOO MUCH ABOUT DIFFERENT THINGS: MORE THAN HALF THE DAYS
2. NOT BEING ABLE TO STOP OR CONTROL WORRYING: MORE THAN HALF THE DAYS
6. BECOMING EASILY ANNOYED OR IRRITABLE: SEVERAL DAYS
5. BEING SO RESTLESS THAT IT IS HARD TO SIT STILL: MORE THAN HALF THE DAYS

## 2021-10-06 ASSESSMENT — PATIENT HEALTH QUESTIONNAIRE - PHQ9: 5. POOR APPETITE OR OVEREATING: SEVERAL DAYS

## 2021-10-06 ASSESSMENT — MIFFLIN-ST. JEOR: SCORE: 1482.12

## 2021-10-06 NOTE — H&P (VIEW-ONLY)
[facility]  [address]  [place]  [place]  [facility] [address]  Phone: [phone]  Fax: [phone]  Primary Provider: Alisha Landrum        PREOPERATIVE EVALUATION:  Today's date: 10/6/2021    Keyla Ni is a 31 year old female who presents for a preoperative evaluation.    Assessment: Keyla Ni is a 31 year old female who presents for pre-op history and physical. She is scheduled to undergo a R foot neuroma removal.     1. Preoperative examination  - Hemoglobin; Future  - UA reflex to Microscopic and Culture; Future  - HCG qualitative urine; Future  - Hemoglobin  - UA reflex to Microscopic and Culture  - HCG qualitative urine  - Urine Microscopic  - Urine Culture Aerobic Bacterial - lab collect; Future    2. Neuroma digital nerve  - Hemoglobin; Future  - UA reflex to Microscopic and Culture; Future  - HCG qualitative urine; Future  - Hemoglobin  - UA reflex to Microscopic and Culture  - HCG qualitative urine  - Urine Microscopic  - Urine Culture Aerobic Bacterial - lab collect; Future    3. Depression With Anxiety  - escitalopram (LEXAPRO) 10 MG tablet; Take 1 tablet (10 mg) by mouth daily  Dispense: 90 tablet; Refill: 0  - MENTAL HEALTH REFERRAL  - Adult; Outpatient Treatment; Individual/Couples/Family/Group Therapy/Health Psychology; [facility] [phone]; We will contact you to schedule the appointment or please call with any questions; Future    4. Acquired hypothyroidism  - T4 free; Future  - TSH; Future  - T4 free  - TSH    5. Gastroesophageal reflux disease without esophagitis  - Comprehensive metabolic panel; Future  - Comprehensive metabolic panel    6. Insomnia, unspecified type  - Comprehensive metabolic panel; Future  - Comprehensive metabolic panel    7. Migraine without aura and without status migrainosus, not intractable  - Comprehensive metabolic panel; Future  - Comprehensive metabolic panel    8.  Acne, unspecified acne type  - Comprehensive metabolic panel; Future  - Comprehensive metabolic panel    9. Allergic rhinitis, unspecified seasonality, unspecified trigger    10. Abnormal urinalysis  - Urine Culture Aerobic Bacterial - lab collect; Future      Patient comes in today for preop exam.  Overall she is doing well.  She is feeling well.  She just got  about a month ago and that went very well.  She notes that she thinks things are relatively stable in terms of her chronic medical problems.  She does have a history of hypothyroidism and is on NP thyroid medication.  We will go ahead and recheck thyroid levels today as its been a while since we checked them and she is a has an upcoming surgery.  We will also draw metabolic panel to follow-up on her trazodone and her spironolactone which she uses for her insomnia and acne respectively.  Her insomnia continues to be quite significant but overall is relatively stable.  She does have a history of gastroesophageal reflux disease and is not currently taking any medication.  She had previously been on Pepcid but is not taking that recently.  She has already received a flu shot.  We did go over the fact that she is due for physical exam after 1/29/2022.  In terms of her allergies she is currently taking Zyrtec and Astelin nasal spray and that seems to be working fine.  She does note that in terms of her anxiety and depression she continues on 5 mg of Lexapro but is wondering if that medication can be increased.  She is noticing that she is feeling on edge all the time.  She thought it was related to her wedding but now that her wedding is over it still there.  She is worrying about things that she does not need to worry about and she is developing things to worry about.  She gets worked up over things that she should not get really upset about and that is disturbing to her.  Please see PHQ-9 and CORNELIA which are both completed in their entirety today.  She is  tolerating Lexapro without a problem.  She is not suicidal or homicidal.  We will go ahead and increase her Lexapro to 10 mg a day.  We will recheck this in about 2 to 3 months when she is here for her next physical exam.  I also placed a referral for a counselor as she thinks that that would be helpful and I am totally in agreement with that.  All of her questions were answered today.  If she becomes suicidal homicidal or cannot keep her self safe she should let me know right away.    Plan:    She appears to be medically optimized for the planned procedure.   . Labs were done as indicated below.  Recommendations were reviewed with the patient. Copy of the pre-op is available electronically.     The patient is approved for surgery and is considered to be low anesthetic risk.   Follow up as needed.    Please page me at [phone] if you have any questions or concerns regarding the care of this patient.     Surgical Information:  Surgery/Procedure: R. Neuroma excision  Surgery Location: [facility]  Surgeon: Dr. Almeida  Surgery Date: 10/18/2021  Time of Surgery: 7am  Where patient plans to recover: At home with family  Fax number for surgical facility: [phone]    Type of Anesthesia Anticipated: to be determined      Subjective     HPI related to upcoming procedure:   HPI: Keyla is a 31 year old female here for a pre-operative consultation. The exam is requested by Dr. Almeida in preparation for R foot neuroma removal to be performed at [facility] on 10/18/2021. Today s examination on 10/6/2021 is done to review the underlying surgical condition of chronic right foot pain as well as to clear for anesthesia and review her medical problems and make appropriate changes in medications. She started to develop pain in the mid right foot about a year ago. This has progressively gotten worse. She was evaluated and found to have a neuroma at the base of the second toe. She did try cortisone  injections x2 which were not helpful with the pain and therefore now presents for removal of this.  Patient is an overall fairly healthy female. She does have a history of hypothyroidism, gastroesophageal reflux, anxiety and depression, insomnia, migraines, acne and allergies. She continues on NP thyroid and will check thyroid levels today. She is not having any symptoms that would be suggestive of thyroid levels being off. She does have a history of GERD which is currently doing well. She is not on any medications currently for this. She does use trazodone nightly for her insomnia. She is on spirolactone for her acne with good results. Her migraines seem to be relatively well controlled. She remains on zyrtec and astelin for her allergies which is working well. She does have anxiety and depression and is currently on lexapro with out side effects.     Preop Questions 10/6/2021   1. Have you ever had a heart attack or stroke? No   2. Have you ever had surgery on your heart or blood vessels, such as a stent placement, a coronary artery bypass, or surgery on an artery in your head, neck, heart, or legs? No   3. Do you have chest pain with activity? No   4. Do you have a history of  heart failure? No   5. Do you currently have a cold, bronchitis or symptoms of other infection? No   6. Do you have a cough, shortness of breath, or wheezing? No   7. Do you or anyone in your family have previous history of blood clots? No   8. Do you or does anyone in your family have a serious bleeding problem such as prolonged bleeding following surgeries or cuts? No   9. Have you ever had problems with anemia or been told to take iron pills? YES - has had anemia in the past   10. Have you had any abnormal blood loss such as black, tarry or bloody stools, or abnormal vaginal bleeding? No   11. Have you ever had a blood transfusion? No   12. Are you willing to have a blood transfusion if it is medically needed before, during, or after  your surgery? Yes   13. Have you or any of your relatives ever had problems with anesthesia? No   14. Do you have sleep apnea, excessive snoring or daytime drowsiness? No   15. Do you have any artifical heart valves or other implanted medical devices like a pacemaker, defibrillator, or continuous glucose monitor? No   16. Do you have artificial joints? No   17. Are you allergic to latex? No   18. Is there any chance that you may be pregnant? No       Health Care Directive:  Patient does not have a Health Care Directive or Living Will: Discussed advance care planning with patient; however, patient declined at this time.    Preoperative Review of :   reviewed - no record of controlled substances prescribed.      Current Outpatient Medications:      azelastine (ASTELIN) 137 mcg (0.1 %) nasal spray, [AZELASTINE (ASTELIN) 137 MCG (0.1 %) NASAL SPRAY] 1 spray into each nostril 2 (two) times a day., Disp: 30 mL, Rfl: 0     cetirizine (ZYRTEC) 10 MG tablet, [CETIRIZINE (ZYRTEC) 10 MG TABLET] Take 10 mg by mouth daily., Disp: , Rfl:      escitalopram (LEXAPRO) 10 MG tablet, Take 1 tablet (10 mg) by mouth daily, Disp: 90 tablet, Rfl: 0     L norgest/e.estradioL-e.estrad (SEASONIQUE) 0.15 mg-30 mcg (84)/10 mcg (7) 3MPk, [L NORGEST/E.ESTRADIOL-E.ESTRAD (SEASONIQUE) 0.15 MG-30 MCG (84)/10 MCG (7) 3MPK] Take 1 tablet by mouth daily., Disp: 1 Package, Rfl: 4     NP THYROID 60 mg Tab, [NP THYROID 60 MG TAB] TAKE 1 TABLET BY MOUTH DAILY. ON AN EMPTY STOMACH WITH SMALL SIP OF WATER AT LEAST 30 MINUTES PRIOR TO MEAL, Disp: 90 tablet, Rfl: 3     spironolactone (ALDACTONE) 50 MG tablet, TAKE 1 TABLET(50 MG) BY MOUTH TWICE DAILY, Disp: 180 tablet, Rfl: 1     traZODone (DESYREL) 50 MG tablet, [TRAZODONE (DESYREL) 50 MG TABLET] TAKE 1 TABLET(50 MG) BY MOUTH AT BEDTIME, Disp: 90 tablet, Rfl: 3     Allergies   Allergen Reactions     Cat Hair Std Allergenic Ext [Cat Hair Extract] Unknown     Grass Unknown     House Dust [Dust Mite  Extract] Unknown     Mold Extracts [Molds & Smuts] Unknown     Other Environmental Allergy      Pollen [Pollen Extract] Unknown     Trees Unknown     Venom-Honey Bee [Bee Venom] Swelling        Past Medical History:   Diagnosis Date     Abnormal Pap smear of cervix 2014    nl since     Acne      Allergic rhinitis      Anemia      Anxiety      ASCUS with positive high risk HPV cervical 4/17/2013 2010, 2011 NIL paps 4/17/13 ASCUS, +HPV (unknown risk factor) 10/22/14 NIL 4/28/17 NIL pap, neg HPV 1/29/21 NIL pap, neg HPV. Routine screening     Major depressive disorder, recurrent episode, unspecified      Unspecified hypothyroidism         Past Surgical History:   Procedure Laterality Date     WISDOM TOOTH EXTRACTION  2011        Family History   Problem Relation Age of Onset     Asthma Brother      Breast Cancer Maternal Aunt         dx age 61     Depression Father      Snoring Father      Depression Mother      Uterine Cancer Mother      Lung Cancer Maternal Grandmother      Cancer Maternal Grandmother         bladder     Thyroid Disease Paternal Grandmother      Osteoporosis Paternal Grandmother      Chronic Obstructive Pulmonary Disease Paternal Grandmother      Sleep Apnea Sister         Social History     Socioeconomic History     Marital status:      Spouse name: Garcia Carreno     Number of children: 0     Years of education: Not on file     Highest education level: Not on file   Occupational History     Occupation: [employer]     Comment: [employer]   Tobacco Use     Smoking status: Never Smoker     Smokeless tobacco: Never Used   Vaping Use     Vaping Use: Never used   Substance and Sexual Activity     Alcohol use: Yes     Alcohol/week: 3.0 standard drinks     Types: 3 Standard drinks or equivalent per week     Drug use: No     Sexual activity: Yes     Partners: Male     Birth control/protection: Pill   Other Topics Concern     Not on file   Social History Narrative     Not on file     Social Determinants of  Health     Financial Resource Strain:      Difficulty of Paying Living Expenses:    Food Insecurity:      Worried About Running Out of Food in the Last Year:      Ran Out of Food in the Last Year:    Transportation Needs:      Lack of Transportation (Medical):      Lack of Transportation (Non-Medical):    Physical Activity:      Days of Exercise per Week:      Minutes of Exercise per Session:    Stress:      Feeling of Stress :    Social Connections:      Frequency of Communication with Friends and Family:      Frequency of Social Gatherings with Friends and Family:      Attends Denominational Services:      Active Member of Clubs or Organizations:      Attends Club or Organization Meetings:      Marital Status:    Intimate Partner Violence:      Fear of Current or Ex-Partner:      Emotionally Abused:      Physically Abused:      Sexually Abused:         Immunization History   Administered Date(s) Administered     COVID-19,PF,Pfizer 2021, 2021     DTAP (<7y) 1995     Flu, Unspecified 2015, 2016, 10/31/2019, 10/12/2020     HPV Quadrivalent 2009, 2009, 10/30/2009     Hep B, Peds or Adolescent 2001, 11/15/2001, 2002     HepA-Adult 2010, 2011     Influenza (IIV3) PF 10/01/2013     Influenza Quad, Recombinant, pf(RIV4) (Flublok) 2021     MMR 10/24/1991, 1997     Poliovirus, inactivated (IPV) 1995     Tdap (Adacel,Boostrix) 2007, 2012        OB History    Para Term  AB Living   0 0 0 0 0 0   SAB TAB Ectopic Multiple Live Births   0 0 0 0 0        Patient Active Problem List    Diagnosis Date Noted     Neuroma digital nerve 2021     Priority: Medium     Added automatically from request for surgery [id number]       Gastroesophageal reflux disease without esophagitis 2021     Priority: Medium     Acne 2017     Priority: Medium     Common Migraine (Without Aura)      Priority: Medium     Created by  "[facility] Annotation: Apr 17 2013  3:38PM - Nadja Small: One every 2   or 3   months  Replacement Utility updated for latest IMO load         Allergic Rhinitis      Priority: Medium     Created by [facility] Annotation: Sep 17 2012 11:40AM - Nadja Small: Allergy   shots   from Dr. Gamino  Replacement Utility updated for latest IMO load         Hypothyroidism      Priority: Medium     Created by Conversion  Replacement Utility updated for latest IMO load         Anemia 01/05/2015     Priority: Medium     Insomnia      Priority: Medium     Created by [facility] Annotation: Apr 17 2013  3:40PM - Alisha Landrum: Falls   asleep   easily, wakes frequently.         Depression With Anxiety      Priority: Medium     Created by Conversion         ASCUS with positive high risk HPV cervical 04/17/2013     Priority: Medium     2010, 2011 NIL paps  4/17/13 ASCUS, +HPV (unknown risk factor)  10/22/14 NIL  4/28/17 NIL pap, neg HPV  1/29/21 NIL pap, neg HPV. Routine screening            History   Drug Use No         Objective     /56   Pulse 78   Temp 97.6  F (36.4  C) (Oral)   Ht 1.645 m (5' 4.75\")   Wt 77 kg (169 lb 12.8 oz)   LMP 09/24/2021 (Exact Date)   SpO2 92%   Breastfeeding No   BMI 28.47 kg/m      Physical Exam  Review of Systems:  Denies fever, chills, visual changes, fatigue, myalgias, nasal congestion, rhinorrhea, ear pain or discharge, sore throat, swollen glands, breast mass, nipple discharge, breast changes, abdominal pain, nausea, vomiting, diarrhea, constipation, cough, shortness of breath, chest pain, weight change, change in bowel habits, melena, rectal bleeding, dysuria, frequency, urgency, hematuria, polyuria, polydipsia, polyphagia, joint pain or swelling or erythema, edema, rash, weakness, paresthesias, vaginal discharge or bleeding or mood changes.  Remainder of review of systems was negative.    Positives: feeling well other than right foot " "pain      Objective:   /56   Pulse 78   Temp 97.6  F (36.4  C) (Oral)   Ht 1.645 m (5' 4.75\")   Wt 77 kg (169 lb 12.8 oz)   LMP 09/24/2021 (Exact Date)   SpO2 92%   Breastfeeding No   BMI 28.47 kg/m        Physical Exam:  General Appearance: Alert, cooperative, no distress, appears stated age  Head: Normocephalic, without obvious abnormality, atraumatic  Eyes: PERRL, conjunctiva/corneas clear, EOM's intact  Ears: Normal TM's and external ear canals, both ears  Nose: Nares normal, septum midline,mucosa normal, no drainage  Throat: Lips, mucosa, and tongue normal; teeth and gums normal  Neck: Supple, symmetrical, trachea midline, no adenopathy;  thyroid: not enlarged, symmetric, no tenderness/mass/nodules  Back: Symmetric, no curvature, ROM normal, no CVA tenderness  Lungs: Clear to auscultation bilaterally, respirations unlabored  Heart: Regular rate and rhythm, S1 and S2 normal, no murmur, rub, or gallop  Abdomen: Soft, non-tender, bowel sounds active all four quadrants,  no masses, no organomegaly  Extremities: Extremities normal, atraumatic, no cyanosis or edema  Skin: Skin color, texture, turgor normal, no rashes or lesions  Lymph nodes: Cervical and supraclavicular nodes normal  Neurologic: Normal   A&O x3, thought processes congruent, non-tangential. No hallucinations/delusions. Insight/judgment: intact. Denies suicidal/homicidal ideations.    PHQ-9:  Last PHQ-9 10/6/2021   1.  Little interest or pleasure in doing things 0   2.  Feeling down, depressed, or hopeless 0   3.  Trouble falling or staying asleep, or sleeping too much 3   4.  Feeling tired or having little energy 3   5.  Poor appetite or overeating 2   6.  Feeling bad about yourself 0   7.  Trouble concentrating 1   8.  Moving slowly or restless 2   Q9: Thoughts of better off dead/self-harm past 2 weeks 0   PHQ-9 Total Score 11   Difficulty at work, home, or with people Somewhat difficult       GAD7:  CORNELIA-7  10/6/2021   1. Feeling " nervous, anxious, or on edge 2   2. Not being able to stop or control worrying 2   3. Worrying too much about different things 2   4. Trouble relaxing 1   5. Being so restless that it is hard to sit still 2   6. Becoming easily annoyed or irritable 1   7. Feeling afraid, as if something awful might happen 0   CORNELIA-7 Total Score 10   If you checked any problems, how difficult have they made it for you to do your work, take care of things at home, or get along with other people? Somewhat difficult           Recent Results (from the past 240 hour(s))   Hemoglobin    Collection Time: 10/06/21 10:34 AM   Result Value Ref Range    Hemoglobin 13.7 11.7 - 15.7 g/dL   UA reflex to Microscopic and Culture    Collection Time: 10/06/21 10:34 AM    Specimen: Urine, Midstream   Result Value Ref Range    Color Urine Yellow Colorless, Straw, Light Yellow, Yellow    Appearance Urine Clear Clear    Glucose Urine Negative Negative mg/dL    Bilirubin Urine Negative Negative    Ketones Urine Negative Negative mg/dL    Specific Gravity Urine 1.015 1.005 - 1.030    Blood Urine Trace (A) Negative    pH Urine 6.5 5.0 - 8.0    Protein Albumin Urine Negative Negative mg/dL    Urobilinogen Urine 0.2 0.2, 1.0 E.U./dL    Nitrite Urine Negative Negative    Leukocyte Esterase Urine Trace (A) Negative   HCG qualitative urine    Collection Time: 10/06/21 10:34 AM   Result Value Ref Range    hCG Urine Qualitative Negative Negative   Comprehensive metabolic panel    Collection Time: 10/06/21 10:34 AM   Result Value Ref Range    Sodium 139 136 - 145 mmol/L    Potassium 4.3 3.5 - 5.0 mmol/L    Chloride 106 98 - 107 mmol/L    Carbon Dioxide (CO2) 25 22 - 31 mmol/L    Anion Gap 8 5 - 18 mmol/L    Urea Nitrogen 8 8 - 22 mg/dL    Creatinine 0.83 0.60 - 1.10 mg/dL    Calcium 9.5 8.5 - 10.5 mg/dL    Glucose 74 70 - 125 mg/dL    Alkaline Phosphatase 36 (L) 45 - 120 U/L    AST 21 0 - 40 U/L    ALT 30 0 - 45 U/L    Protein Total 6.7 6.0 - 8.0 g/dL    Albumin 3.7  3.5 - 5.0 g/dL    Bilirubin Total 0.5 0.0 - 1.0 mg/dL    GFR Estimate >90 >60 mL/min/1.73m2   TSH    Collection Time: 10/06/21 10:34 AM   Result Value Ref Range    TSH 13.88 (H) 0.30 - 5.00 uIU/mL   Urine Microscopic    Collection Time: 10/06/21 10:34 AM   Result Value Ref Range    Bacteria Urine Many (A) None Seen /HPF    RBC Urine 0-2 0-2 /HPF /HPF    WBC Urine 5-10 (A) 0-5 /HPF /HPF    Squamous Epithelials Urine Moderate (A) None Seen /LPF         Alisha Landrum M.D.  [address]  [address]  [place] [address]  Ph: [phone] Fax: [phone]  Pager [phone]    Recent Labs   Lab Test 01/29/21  0933 09/18/20  0813   HGB 14.3 14.9    230    140   POTASSIUM 3.9 4.4   CR 0.80 0.90          Revised Cardiac Risk Index (RCRI):  The patient has the following serious cardiovascular risks for perioperative complications:   - No serious cardiac risks = 0 points     RCRI Interpretation: 0 points: Class I (very low risk - 0.4% complication rate)           Signed Electronically by: Alisha Landrum MD  Copy of this evaluation report is provided to requesting physician.

## 2021-10-06 NOTE — PROGRESS NOTES
[facility]  [address]  [place]  [place]  [facility] [address]  Phone: [phone]  Fax: [phone]  Primary Provider: Alisha Landrum        PREOPERATIVE EVALUATION:  Today's date: 10/6/2021    Keyla Ni is a 31 year old female who presents for a preoperative evaluation.    Assessment: Keyla Ni is a 31 year old female who presents for pre-op history and physical. She is scheduled to undergo a R foot neuroma removal.     1. Preoperative examination  - Hemoglobin; Future  - UA reflex to Microscopic and Culture; Future  - HCG qualitative urine; Future  - Hemoglobin  - UA reflex to Microscopic and Culture  - HCG qualitative urine  - Urine Microscopic  - Urine Culture Aerobic Bacterial - lab collect; Future    2. Neuroma digital nerve  - Hemoglobin; Future  - UA reflex to Microscopic and Culture; Future  - HCG qualitative urine; Future  - Hemoglobin  - UA reflex to Microscopic and Culture  - HCG qualitative urine  - Urine Microscopic  - Urine Culture Aerobic Bacterial - lab collect; Future    3. Depression With Anxiety  - escitalopram (LEXAPRO) 10 MG tablet; Take 1 tablet (10 mg) by mouth daily  Dispense: 90 tablet; Refill: 0  - MENTAL HEALTH REFERRAL  - Adult; Outpatient Treatment; Individual/Couples/Family/Group Therapy/Health Psychology; [facility] [phone]; We will contact you to schedule the appointment or please call with any questions; Future    4. Acquired hypothyroidism  - T4 free; Future  - TSH; Future  - T4 free  - TSH    5. Gastroesophageal reflux disease without esophagitis  - Comprehensive metabolic panel; Future  - Comprehensive metabolic panel    6. Insomnia, unspecified type  - Comprehensive metabolic panel; Future  - Comprehensive metabolic panel    7. Migraine without aura and without status migrainosus, not intractable  - Comprehensive metabolic panel; Future  - Comprehensive metabolic panel    8.  Acne, unspecified acne type  - Comprehensive metabolic panel; Future  - Comprehensive metabolic panel    9. Allergic rhinitis, unspecified seasonality, unspecified trigger    10. Abnormal urinalysis  - Urine Culture Aerobic Bacterial - lab collect; Future      Patient comes in today for preop exam.  Overall she is doing well.  She is feeling well.  She just got  about a month ago and that went very well.  She notes that she thinks things are relatively stable in terms of her chronic medical problems.  She does have a history of hypothyroidism and is on NP thyroid medication.  We will go ahead and recheck thyroid levels today as its been a while since we checked them and she is a has an upcoming surgery.  We will also draw metabolic panel to follow-up on her trazodone and her spironolactone which she uses for her insomnia and acne respectively.  Her insomnia continues to be quite significant but overall is relatively stable.  She does have a history of gastroesophageal reflux disease and is not currently taking any medication.  She had previously been on Pepcid but is not taking that recently.  She has already received a flu shot.  We did go over the fact that she is due for physical exam after 1/29/2022.  In terms of her allergies she is currently taking Zyrtec and Astelin nasal spray and that seems to be working fine.  She does note that in terms of her anxiety and depression she continues on 5 mg of Lexapro but is wondering if that medication can be increased.  She is noticing that she is feeling on edge all the time.  She thought it was related to her wedding but now that her wedding is over it still there.  She is worrying about things that she does not need to worry about and she is developing things to worry about.  She gets worked up over things that she should not get really upset about and that is disturbing to her.  Please see PHQ-9 and CORNELIA which are both completed in their entirety today.  She is  tolerating Lexapro without a problem.  She is not suicidal or homicidal.  We will go ahead and increase her Lexapro to 10 mg a day.  We will recheck this in about 2 to 3 months when she is here for her next physical exam.  I also placed a referral for a counselor as she thinks that that would be helpful and I am totally in agreement with that.  All of her questions were answered today.  If she becomes suicidal homicidal or cannot keep her self safe she should let me know right away.    Plan:    She appears to be medically optimized for the planned procedure.   . Labs were done as indicated below.  Recommendations were reviewed with the patient. Copy of the pre-op is available electronically.     The patient is approved for surgery and is considered to be low anesthetic risk.   Follow up as needed.    Please page me at [phone] if you have any questions or concerns regarding the care of this patient.     Surgical Information:  Surgery/Procedure: R. Neuroma excision  Surgery Location: [facility]  Surgeon: Dr. Almeida  Surgery Date: 10/18/2021  Time of Surgery: 7am  Where patient plans to recover: At home with family  Fax number for surgical facility: [phone]    Type of Anesthesia Anticipated: to be determined      Subjective     HPI related to upcoming procedure:   HPI: Keyla is a 31 year old female here for a pre-operative consultation. The exam is requested by Dr. Almeida in preparation for R foot neuroma removal to be performed at [facility] on 10/18/2021. Today s examination on 10/6/2021 is done to review the underlying surgical condition of chronic right foot pain as well as to clear for anesthesia and review her medical problems and make appropriate changes in medications. She started to develop pain in the mid right foot about a year ago. This has progressively gotten worse. She was evaluated and found to have a neuroma at the base of the second toe. She did try cortisone  injections x2 which were not helpful with the pain and therefore now presents for removal of this.  Patient is an overall fairly healthy female. She does have a history of hypothyroidism, gastroesophageal reflux, anxiety and depression, insomnia, migraines, acne and allergies. She continues on NP thyroid and will check thyroid levels today. She is not having any symptoms that would be suggestive of thyroid levels being off. She does have a history of GERD which is currently doing well. She is not on any medications currently for this. She does use trazodone nightly for her insomnia. She is on spirolactone for her acne with good results. Her migraines seem to be relatively well controlled. She remains on zyrtec and astelin for her allergies which is working well. She does have anxiety and depression and is currently on lexapro with out side effects.     Preop Questions 10/6/2021   1. Have you ever had a heart attack or stroke? No   2. Have you ever had surgery on your heart or blood vessels, such as a stent placement, a coronary artery bypass, or surgery on an artery in your head, neck, heart, or legs? No   3. Do you have chest pain with activity? No   4. Do you have a history of  heart failure? No   5. Do you currently have a cold, bronchitis or symptoms of other infection? No   6. Do you have a cough, shortness of breath, or wheezing? No   7. Do you or anyone in your family have previous history of blood clots? No   8. Do you or does anyone in your family have a serious bleeding problem such as prolonged bleeding following surgeries or cuts? No   9. Have you ever had problems with anemia or been told to take iron pills? YES - has had anemia in the past   10. Have you had any abnormal blood loss such as black, tarry or bloody stools, or abnormal vaginal bleeding? No   11. Have you ever had a blood transfusion? No   12. Are you willing to have a blood transfusion if it is medically needed before, during, or after  your surgery? Yes   13. Have you or any of your relatives ever had problems with anesthesia? No   14. Do you have sleep apnea, excessive snoring or daytime drowsiness? No   15. Do you have any artifical heart valves or other implanted medical devices like a pacemaker, defibrillator, or continuous glucose monitor? No   16. Do you have artificial joints? No   17. Are you allergic to latex? No   18. Is there any chance that you may be pregnant? No       Health Care Directive:  Patient does not have a Health Care Directive or Living Will: Discussed advance care planning with patient; however, patient declined at this time.    Preoperative Review of :   reviewed - no record of controlled substances prescribed.      Current Outpatient Medications:      azelastine (ASTELIN) 137 mcg (0.1 %) nasal spray, [AZELASTINE (ASTELIN) 137 MCG (0.1 %) NASAL SPRAY] 1 spray into each nostril 2 (two) times a day., Disp: 30 mL, Rfl: 0     cetirizine (ZYRTEC) 10 MG tablet, [CETIRIZINE (ZYRTEC) 10 MG TABLET] Take 10 mg by mouth daily., Disp: , Rfl:      escitalopram (LEXAPRO) 10 MG tablet, Take 1 tablet (10 mg) by mouth daily, Disp: 90 tablet, Rfl: 0     L norgest/e.estradioL-e.estrad (SEASONIQUE) 0.15 mg-30 mcg (84)/10 mcg (7) 3MPk, [L NORGEST/E.ESTRADIOL-E.ESTRAD (SEASONIQUE) 0.15 MG-30 MCG (84)/10 MCG (7) 3MPK] Take 1 tablet by mouth daily., Disp: 1 Package, Rfl: 4     NP THYROID 60 mg Tab, [NP THYROID 60 MG TAB] TAKE 1 TABLET BY MOUTH DAILY. ON AN EMPTY STOMACH WITH SMALL SIP OF WATER AT LEAST 30 MINUTES PRIOR TO MEAL, Disp: 90 tablet, Rfl: 3     spironolactone (ALDACTONE) 50 MG tablet, TAKE 1 TABLET(50 MG) BY MOUTH TWICE DAILY, Disp: 180 tablet, Rfl: 1     traZODone (DESYREL) 50 MG tablet, [TRAZODONE (DESYREL) 50 MG TABLET] TAKE 1 TABLET(50 MG) BY MOUTH AT BEDTIME, Disp: 90 tablet, Rfl: 3     Allergies   Allergen Reactions     Cat Hair Std Allergenic Ext [Cat Hair Extract] Unknown     Grass Unknown     House Dust [Dust Mite  Extract] Unknown     Mold Extracts [Molds & Smuts] Unknown     Other Environmental Allergy      Pollen [Pollen Extract] Unknown     Trees Unknown     Venom-Honey Bee [Bee Venom] Swelling        Past Medical History:   Diagnosis Date     Abnormal Pap smear of cervix 2014    nl since     Acne      Allergic rhinitis      Anemia      Anxiety      ASCUS with positive high risk HPV cervical 4/17/2013 2010, 2011 NIL paps 4/17/13 ASCUS, +HPV (unknown risk factor) 10/22/14 NIL 4/28/17 NIL pap, neg HPV 1/29/21 NIL pap, neg HPV. Routine screening     Major depressive disorder, recurrent episode, unspecified      Unspecified hypothyroidism         Past Surgical History:   Procedure Laterality Date     WISDOM TOOTH EXTRACTION  2011        Family History   Problem Relation Age of Onset     Asthma Brother      Breast Cancer Maternal Aunt         dx age 61     Depression Father      Snoring Father      Depression Mother      Uterine Cancer Mother      Lung Cancer Maternal Grandmother      Cancer Maternal Grandmother         bladder     Thyroid Disease Paternal Grandmother      Osteoporosis Paternal Grandmother      Chronic Obstructive Pulmonary Disease Paternal Grandmother      Sleep Apnea Sister         Social History     Socioeconomic History     Marital status:      Spouse name: Garcia Carreno     Number of children: 0     Years of education: Not on file     Highest education level: Not on file   Occupational History     Occupation: [employer]     Comment: [employer]   Tobacco Use     Smoking status: Never Smoker     Smokeless tobacco: Never Used   Vaping Use     Vaping Use: Never used   Substance and Sexual Activity     Alcohol use: Yes     Alcohol/week: 3.0 standard drinks     Types: 3 Standard drinks or equivalent per week     Drug use: No     Sexual activity: Yes     Partners: Male     Birth control/protection: Pill   Other Topics Concern     Not on file   Social History Narrative     Not on file     Social Determinants of  Health     Financial Resource Strain:      Difficulty of Paying Living Expenses:    Food Insecurity:      Worried About Running Out of Food in the Last Year:      Ran Out of Food in the Last Year:    Transportation Needs:      Lack of Transportation (Medical):      Lack of Transportation (Non-Medical):    Physical Activity:      Days of Exercise per Week:      Minutes of Exercise per Session:    Stress:      Feeling of Stress :    Social Connections:      Frequency of Communication with Friends and Family:      Frequency of Social Gatherings with Friends and Family:      Attends [employer] Services:      Active Member of Clubs or Organizations:      Attends Club or Organization Meetings:      Marital Status:    Intimate Partner Violence:      Fear of Current or Ex-Partner:      Emotionally Abused:      Physically Abused:      Sexually Abused:         Immunization History   Administered Date(s) Administered     COVID-19,PF,Pfizer 2021, 2021     DTAP (<7y) 1995     Flu, Unspecified 2015, 2016, 10/31/2019, 10/12/2020     HPV Quadrivalent 2009, 2009, 10/30/2009     Hep B, Peds or Adolescent 2001, 11/15/2001, 2002     HepA-Adult 2010, 2011     Influenza (IIV3) PF 10/01/2013     Influenza Quad, Recombinant, pf(RIV4) (Flublok) 2021     MMR 10/24/1991, 1997     Poliovirus, inactivated (IPV) 1995     Tdap (Adacel,Boostrix) 2007, 2012        OB History    Para Term  AB Living   0 0 0 0 0 0   SAB TAB Ectopic Multiple Live Births   0 0 0 0 0        Patient Active Problem List    Diagnosis Date Noted     Neuroma digital nerve 2021     Priority: Medium     Added automatically from request for surgery [id number]       Gastroesophageal reflux disease without esophagitis 2021     Priority: Medium     Acne 2017     Priority: Medium     Common Migraine (Without Aura)      Priority: Medium     Created by  "[facility] Annotation: Apr 17 2013  3:38PM - Nadja Small: One every 2   or 3   months  Replacement Utility updated for latest IMO load         Allergic Rhinitis      Priority: Medium     Created by [facility] Annotation: Sep 17 2012 11:40AM - Nadja Small: Allergy   shots   from Dr. Gamino  Replacement Utility updated for latest IMO load         Hypothyroidism      Priority: Medium     Created by Conversion  Replacement Utility updated for latest IMO load         Anemia 01/05/2015     Priority: Medium     Insomnia      Priority: Medium     Created by [facility] Annotation: Apr 17 2013  3:40PM - Alisha Landrum: Falls   asleep   easily, wakes frequently.         Depression With Anxiety      Priority: Medium     Created by Conversion         ASCUS with positive high risk HPV cervical 04/17/2013     Priority: Medium     2010, 2011 NIL paps  4/17/13 ASCUS, +HPV (unknown risk factor)  10/22/14 NIL  4/28/17 NIL pap, neg HPV  1/29/21 NIL pap, neg HPV. Routine screening            History   Drug Use No         Objective     /56   Pulse 78   Temp 97.6  F (36.4  C) (Oral)   Ht 1.645 m (5' 4.75\")   Wt 77 kg (169 lb 12.8 oz)   LMP 09/24/2021 (Exact Date)   SpO2 92%   Breastfeeding No   BMI 28.47 kg/m      Physical Exam  Review of Systems:  Denies fever, chills, visual changes, fatigue, myalgias, nasal congestion, rhinorrhea, ear pain or discharge, sore throat, swollen glands, breast mass, nipple discharge, breast changes, abdominal pain, nausea, vomiting, diarrhea, constipation, cough, shortness of breath, chest pain, weight change, change in bowel habits, melena, rectal bleeding, dysuria, frequency, urgency, hematuria, polyuria, polydipsia, polyphagia, joint pain or swelling or erythema, edema, rash, weakness, paresthesias, vaginal discharge or bleeding or mood changes.  Remainder of review of systems was negative.    Positives: feeling well other than right foot " "pain      Objective:   /56   Pulse 78   Temp 97.6  F (36.4  C) (Oral)   Ht 1.645 m (5' 4.75\")   Wt 77 kg (169 lb 12.8 oz)   LMP 09/24/2021 (Exact Date)   SpO2 92%   Breastfeeding No   BMI 28.47 kg/m        Physical Exam:  General Appearance: Alert, cooperative, no distress, appears stated age  Head: Normocephalic, without obvious abnormality, atraumatic  Eyes: PERRL, conjunctiva/corneas clear, EOM's intact  Ears: Normal TM's and external ear canals, both ears  Nose: Nares normal, septum midline,mucosa normal, no drainage  Throat: Lips, mucosa, and tongue normal; teeth and gums normal  Neck: Supple, symmetrical, trachea midline, no adenopathy;  thyroid: not enlarged, symmetric, no tenderness/mass/nodules  Back: Symmetric, no curvature, ROM normal, no CVA tenderness  Lungs: Clear to auscultation bilaterally, respirations unlabored  Heart: Regular rate and rhythm, S1 and S2 normal, no murmur, rub, or gallop  Abdomen: Soft, non-tender, bowel sounds active all four quadrants,  no masses, no organomegaly  Extremities: Extremities normal, atraumatic, no cyanosis or edema  Skin: Skin color, texture, turgor normal, no rashes or lesions  Lymph nodes: Cervical and supraclavicular nodes normal  Neurologic: Normal   A&O x3, thought processes congruent, non-tangential. No hallucinations/delusions. Insight/judgment: intact. Denies suicidal/homicidal ideations.    PHQ-9:  Last PHQ-9 10/6/2021   1.  Little interest or pleasure in doing things 0   2.  Feeling down, depressed, or hopeless 0   3.  Trouble falling or staying asleep, or sleeping too much 3   4.  Feeling tired or having little energy 3   5.  Poor appetite or overeating 2   6.  Feeling bad about yourself 0   7.  Trouble concentrating 1   8.  Moving slowly or restless 2   Q9: Thoughts of better off dead/self-harm past 2 weeks 0   PHQ-9 Total Score 11   Difficulty at work, home, or with people Somewhat difficult       GAD7:  CORNELIA-7  10/6/2021   1. Feeling " nervous, anxious, or on edge 2   2. Not being able to stop or control worrying 2   3. Worrying too much about different things 2   4. Trouble relaxing 1   5. Being so restless that it is hard to sit still 2   6. Becoming easily annoyed or irritable 1   7. Feeling afraid, as if something awful might happen 0   CORNELIA-7 Total Score 10   If you checked any problems, how difficult have they made it for you to do your work, take care of things at home, or get along with other people? Somewhat difficult           Recent Results (from the past 240 hour(s))   Hemoglobin    Collection Time: 10/06/21 10:34 AM   Result Value Ref Range    Hemoglobin 13.7 11.7 - 15.7 g/dL   UA reflex to Microscopic and Culture    Collection Time: 10/06/21 10:34 AM    Specimen: Urine, Midstream   Result Value Ref Range    Color Urine Yellow Colorless, Straw, Light Yellow, Yellow    Appearance Urine Clear Clear    Glucose Urine Negative Negative mg/dL    Bilirubin Urine Negative Negative    Ketones Urine Negative Negative mg/dL    Specific Gravity Urine 1.015 1.005 - 1.030    Blood Urine Trace (A) Negative    pH Urine 6.5 5.0 - 8.0    Protein Albumin Urine Negative Negative mg/dL    Urobilinogen Urine 0.2 0.2, 1.0 E.U./dL    Nitrite Urine Negative Negative    Leukocyte Esterase Urine Trace (A) Negative   HCG qualitative urine    Collection Time: 10/06/21 10:34 AM   Result Value Ref Range    hCG Urine Qualitative Negative Negative   Comprehensive metabolic panel    Collection Time: 10/06/21 10:34 AM   Result Value Ref Range    Sodium 139 136 - 145 mmol/L    Potassium 4.3 3.5 - 5.0 mmol/L    Chloride 106 98 - 107 mmol/L    Carbon Dioxide (CO2) 25 22 - 31 mmol/L    Anion Gap 8 5 - 18 mmol/L    Urea Nitrogen 8 8 - 22 mg/dL    Creatinine 0.83 0.60 - 1.10 mg/dL    Calcium 9.5 8.5 - 10.5 mg/dL    Glucose 74 70 - 125 mg/dL    Alkaline Phosphatase 36 (L) 45 - 120 U/L    AST 21 0 - 40 U/L    ALT 30 0 - 45 U/L    Protein Total 6.7 6.0 - 8.0 g/dL    Albumin 3.7  3.5 - 5.0 g/dL    Bilirubin Total 0.5 0.0 - 1.0 mg/dL    GFR Estimate >90 >60 mL/min/1.73m2   TSH    Collection Time: 10/06/21 10:34 AM   Result Value Ref Range    TSH 13.88 (H) 0.30 - 5.00 uIU/mL   Urine Microscopic    Collection Time: 10/06/21 10:34 AM   Result Value Ref Range    Bacteria Urine Many (A) None Seen /HPF    RBC Urine 0-2 0-2 /HPF /HPF    WBC Urine 5-10 (A) 0-5 /HPF /HPF    Squamous Epithelials Urine Moderate (A) None Seen /LPF         Alisha Landrum M.D.  [address]  [address]  [place] [address]  Ph: [phone] Fax: [phone]  Pager [phone]    Recent Labs   Lab Test 01/29/21  0933 09/18/20  0813   HGB 14.3 14.9    230    140   POTASSIUM 3.9 4.4   CR 0.80 0.90          Revised Cardiac Risk Index (RCRI):  The patient has the following serious cardiovascular risks for perioperative complications:   - No serious cardiac risks = 0 points     RCRI Interpretation: 0 points: Class I (very low risk - 0.4% complication rate)           Signed Electronically by: Alisha Landrum MD  Copy of this evaluation report is provided to requesting physician.

## 2021-10-08 ENCOUNTER — MYC MEDICAL ADVICE (OUTPATIENT)
Dept: FAMILY MEDICINE | Facility: CLINIC | Age: 31
End: 2021-10-08

## 2021-10-08 ENCOUNTER — TELEPHONE (OUTPATIENT)
Dept: FAMILY MEDICINE | Facility: CLINIC | Age: 31
End: 2021-10-08

## 2021-10-08 DIAGNOSIS — E03.9 ACQUIRED HYPOTHYROIDISM: Primary | ICD-10-CM

## 2021-10-08 LAB — T4 FREE SERPL-MCNC: 0.61 NG/DL (ref 0.7–1.8)

## 2021-10-08 RX ORDER — THYROID 90 MG/1
1 TABLET ORAL DAILY
Qty: 90 TABLET | Refills: 0 | Status: SHIPPED | OUTPATIENT
Start: 2021-10-08 | End: 2022-02-18

## 2021-10-08 ASSESSMENT — PATIENT HEALTH QUESTIONNAIRE - PHQ9
SUM OF ALL RESPONSES TO PHQ QUESTIONS 1-9: 11
5. POOR APPETITE OR OVEREATING: SEVERAL DAYS

## 2021-10-08 ASSESSMENT — ANXIETY QUESTIONNAIRES
1. FEELING NERVOUS, ANXIOUS, OR ON EDGE: MORE THAN HALF THE DAYS
5. BEING SO RESTLESS THAT IT IS HARD TO SIT STILL: MORE THAN HALF THE DAYS
IF YOU CHECKED OFF ANY PROBLEMS ON THIS QUESTIONNAIRE, HOW DIFFICULT HAVE THESE PROBLEMS MADE IT FOR YOU TO DO YOUR WORK, TAKE CARE OF THINGS AT HOME, OR GET ALONG WITH OTHER PEOPLE: SOMEWHAT DIFFICULT
GAD7 TOTAL SCORE: 10
7. FEELING AFRAID AS IF SOMETHING AWFUL MIGHT HAPPEN: NOT AT ALL
3. WORRYING TOO MUCH ABOUT DIFFERENT THINGS: MORE THAN HALF THE DAYS
2. NOT BEING ABLE TO STOP OR CONTROL WORRYING: MORE THAN HALF THE DAYS

## 2021-10-08 NOTE — TELEPHONE ENCOUNTER
Please call patient with the information below:      Your thyroid level is a little off. Have you been taking your medication regularly? Have you missed any doses?      All of the other labs we did look fine for your upcoming surgery.   If you have been taking the thyroid medication consistently, we should probably change the dose of the medication.

## 2021-10-14 ENCOUNTER — ANESTHESIA EVENT (OUTPATIENT)
Dept: SURGERY | Facility: AMBULATORY SURGERY CENTER | Age: 31
End: 2021-10-14
Payer: COMMERCIAL

## 2021-10-14 ENCOUNTER — LAB (OUTPATIENT)
Dept: LAB | Facility: CLINIC | Age: 31
End: 2021-10-14
Attending: PODIATRIST
Payer: COMMERCIAL

## 2021-10-14 DIAGNOSIS — Z11.59 ENCOUNTER FOR SCREENING FOR OTHER VIRAL DISEASES: ICD-10-CM

## 2021-10-14 LAB — SARS-COV-2 RNA RESP QL NAA+PROBE: NEGATIVE

## 2021-10-14 PROCEDURE — U0005 INFEC AGEN DETEC AMPLI PROBE: HCPCS

## 2021-10-14 PROCEDURE — U0003 INFECTIOUS AGENT DETECTION BY NUCLEIC ACID (DNA OR RNA); SEVERE ACUTE RESPIRATORY SYNDROME CORONAVIRUS 2 (SARS-COV-2) (CORONAVIRUS DISEASE [COVID-19]), AMPLIFIED PROBE TECHNIQUE, MAKING USE OF HIGH THROUGHPUT TECHNOLOGIES AS DESCRIBED BY CMS-2020-01-R: HCPCS

## 2021-10-18 ENCOUNTER — HOSPITAL ENCOUNTER (OUTPATIENT)
Facility: AMBULATORY SURGERY CENTER | Age: 31
End: 2021-10-18
Attending: PODIATRIST
Payer: COMMERCIAL

## 2021-10-18 ENCOUNTER — ANESTHESIA (OUTPATIENT)
Dept: SURGERY | Facility: AMBULATORY SURGERY CENTER | Age: 31
End: 2021-10-18
Payer: COMMERCIAL

## 2021-10-18 VITALS
DIASTOLIC BLOOD PRESSURE: 63 MMHG | SYSTOLIC BLOOD PRESSURE: 109 MMHG | HEIGHT: 65 IN | HEART RATE: 76 BPM | BODY MASS INDEX: 28.29 KG/M2 | OXYGEN SATURATION: 99 % | TEMPERATURE: 97.1 F | WEIGHT: 169.8 LBS | RESPIRATION RATE: 16 BRPM

## 2021-10-18 DIAGNOSIS — G58.8 NEUROMA DIGITAL NERVE: ICD-10-CM

## 2021-10-18 PROCEDURE — 28080 REMOVAL OF FOOT LESION: CPT | Mod: RT | Performed by: PODIATRIST

## 2021-10-18 RX ORDER — KETAMINE HYDROCHLORIDE 10 MG/ML
INJECTION INTRAMUSCULAR; INTRAVENOUS PRN
Status: DISCONTINUED | OUTPATIENT
Start: 2021-10-18 | End: 2021-10-18

## 2021-10-18 RX ORDER — DEXAMETHASONE SODIUM PHOSPHATE 4 MG/ML
INJECTION, SOLUTION INTRA-ARTICULAR; INTRALESIONAL; INTRAMUSCULAR; INTRAVENOUS; SOFT TISSUE PRN
Status: DISCONTINUED | OUTPATIENT
Start: 2021-10-18 | End: 2021-10-18

## 2021-10-18 RX ORDER — PROPOFOL 10 MG/ML
INJECTION, EMULSION INTRAVENOUS CONTINUOUS PRN
Status: DISCONTINUED | OUTPATIENT
Start: 2021-10-18 | End: 2021-10-18

## 2021-10-18 RX ORDER — OXYCODONE HYDROCHLORIDE 5 MG/1
5 TABLET ORAL EVERY 4 HOURS PRN
Status: DISCONTINUED | OUTPATIENT
Start: 2021-10-18 | End: 2021-10-19 | Stop reason: HOSPADM

## 2021-10-18 RX ORDER — PROPOFOL 10 MG/ML
INJECTION, EMULSION INTRAVENOUS PRN
Status: DISCONTINUED | OUTPATIENT
Start: 2021-10-18 | End: 2021-10-18

## 2021-10-18 RX ORDER — HYDROCODONE BITARTRATE AND ACETAMINOPHEN 5; 325 MG/1; MG/1
1-2 TABLET ORAL EVERY 6 HOURS PRN
Qty: 20 TABLET | Refills: 0 | Status: SHIPPED | OUTPATIENT
Start: 2021-10-18 | End: 2021-12-07

## 2021-10-18 RX ORDER — HYDROMORPHONE HCL IN WATER/PF 6 MG/30 ML
0.2 PATIENT CONTROLLED ANALGESIA SYRINGE INTRAVENOUS EVERY 5 MIN PRN
Status: DISCONTINUED | OUTPATIENT
Start: 2021-10-18 | End: 2021-10-19 | Stop reason: HOSPADM

## 2021-10-18 RX ORDER — ONDANSETRON 2 MG/ML
4 INJECTION INTRAMUSCULAR; INTRAVENOUS EVERY 30 MIN PRN
Status: DISCONTINUED | OUTPATIENT
Start: 2021-10-18 | End: 2021-10-19 | Stop reason: HOSPADM

## 2021-10-18 RX ORDER — CEPHALEXIN 500 MG/1
500 CAPSULE ORAL 2 TIMES DAILY
Qty: 14 CAPSULE | Refills: 0 | Status: SHIPPED | OUTPATIENT
Start: 2021-10-18 | End: 2021-10-25

## 2021-10-18 RX ORDER — LIDOCAINE 40 MG/G
CREAM TOPICAL
Status: DISCONTINUED | OUTPATIENT
Start: 2021-10-18 | End: 2021-10-19 | Stop reason: HOSPADM

## 2021-10-18 RX ORDER — MEPERIDINE HYDROCHLORIDE 25 MG/ML
12.5 INJECTION INTRAMUSCULAR; INTRAVENOUS; SUBCUTANEOUS
Status: DISCONTINUED | OUTPATIENT
Start: 2021-10-18 | End: 2021-10-19 | Stop reason: HOSPADM

## 2021-10-18 RX ORDER — ONDANSETRON 4 MG/1
4 TABLET, ORALLY DISINTEGRATING ORAL EVERY 30 MIN PRN
Status: DISCONTINUED | OUTPATIENT
Start: 2021-10-18 | End: 2021-10-19 | Stop reason: HOSPADM

## 2021-10-18 RX ORDER — SODIUM CHLORIDE, SODIUM LACTATE, POTASSIUM CHLORIDE, CALCIUM CHLORIDE 600; 310; 30; 20 MG/100ML; MG/100ML; MG/100ML; MG/100ML
INJECTION, SOLUTION INTRAVENOUS CONTINUOUS
Status: DISCONTINUED | OUTPATIENT
Start: 2021-10-18 | End: 2021-10-19 | Stop reason: HOSPADM

## 2021-10-18 RX ORDER — FENTANYL CITRATE 50 UG/ML
25 INJECTION, SOLUTION INTRAMUSCULAR; INTRAVENOUS EVERY 5 MIN PRN
Status: DISCONTINUED | OUTPATIENT
Start: 2021-10-18 | End: 2021-10-19 | Stop reason: HOSPADM

## 2021-10-18 RX ORDER — ONDANSETRON 2 MG/ML
INJECTION INTRAMUSCULAR; INTRAVENOUS PRN
Status: DISCONTINUED | OUTPATIENT
Start: 2021-10-18 | End: 2021-10-18

## 2021-10-18 RX ORDER — FENTANYL CITRATE 50 UG/ML
25 INJECTION, SOLUTION INTRAMUSCULAR; INTRAVENOUS
Status: DISCONTINUED | OUTPATIENT
Start: 2021-10-18 | End: 2021-10-19 | Stop reason: HOSPADM

## 2021-10-18 RX ORDER — LIDOCAINE HYDROCHLORIDE 20 MG/ML
INJECTION, SOLUTION INFILTRATION; PERINEURAL PRN
Status: DISCONTINUED | OUTPATIENT
Start: 2021-10-18 | End: 2021-10-18

## 2021-10-18 RX ORDER — FENTANYL CITRATE 50 UG/ML
INJECTION, SOLUTION INTRAMUSCULAR; INTRAVENOUS PRN
Status: DISCONTINUED | OUTPATIENT
Start: 2021-10-18 | End: 2021-10-18

## 2021-10-18 RX ADMIN — KETAMINE HYDROCHLORIDE 20 MG: 10 INJECTION INTRAMUSCULAR; INTRAVENOUS at 07:03

## 2021-10-18 RX ADMIN — PROPOFOL 200 MCG/KG/MIN: 10 INJECTION, EMULSION INTRAVENOUS at 07:02

## 2021-10-18 RX ADMIN — FENTANYL CITRATE 50 MCG: 50 INJECTION, SOLUTION INTRAMUSCULAR; INTRAVENOUS at 07:00

## 2021-10-18 RX ADMIN — PROPOFOL 30 MG: 10 INJECTION, EMULSION INTRAVENOUS at 07:03

## 2021-10-18 RX ADMIN — SODIUM CHLORIDE, SODIUM LACTATE, POTASSIUM CHLORIDE, CALCIUM CHLORIDE: 600; 310; 30; 20 INJECTION, SOLUTION INTRAVENOUS at 06:51

## 2021-10-18 RX ADMIN — DEXAMETHASONE SODIUM PHOSPHATE 4 MG: 4 INJECTION, SOLUTION INTRA-ARTICULAR; INTRALESIONAL; INTRAMUSCULAR; INTRAVENOUS; SOFT TISSUE at 07:06

## 2021-10-18 RX ADMIN — LIDOCAINE HYDROCHLORIDE 40 MG: 20 INJECTION, SOLUTION INFILTRATION; PERINEURAL at 07:00

## 2021-10-18 RX ADMIN — ONDANSETRON 4 MG: 2 INJECTION INTRAMUSCULAR; INTRAVENOUS at 07:15

## 2021-10-18 ASSESSMENT — MIFFLIN-ST. JEOR: SCORE: 1486.09

## 2021-10-18 NOTE — ANESTHESIA PREPROCEDURE EVALUATION
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 Routine screening     Gastroesophageal reflux disease      Major depressive disorder, recurrent episode, unspecified      Unspecified hypothyroidism       Past Surgical History:   Procedure Laterality Date     WISDOM TOOTH EXTRACTION        Allergies   Allergen Reactions     Cat Hair Std Allergenic Ext [Cat Hair Extract] Unknown     Grass Unknown     House Dust [Dust Mite Extract] Unknown     Mold Extracts [Molds & Smuts] Unknown     Other Environmental Allergy      Pollen [Pollen Extract] Unknown     Trees Unknown     Venom-Honey Bee [Bee Venom] Swelling      Social History     Tobacco Use     Smoking status: Never Smoker     Smokeless tobacco: Never Used   Substance Use Topics     Alcohol use: Yes     Alcohol/week: 3.0 standard drinks     Types: 3 Standard drinks or equivalent per week      Wt Readings from Last 1 Encounters:   10/18/21 77 kg (169 lb 12.8 oz)        Anesthesia Evaluation            ROS/MED HX  ENT/Pulmonary:  - neg pulmonary ROS     Neurologic:  - neg neurologic ROS     Cardiovascular:  - neg cardiovascular ROS     METS/Exercise Tolerance:     Hematologic:  - neg hematologic  ROS     Musculoskeletal:  - neg musculoskeletal ROS     GI/Hepatic:  - neg GI/hepatic ROS   (+) GERD, Asymptomatic on medication,     Renal/Genitourinary:  - neg Renal ROS     Endo:  - neg endo ROS   (+) thyroid problem,     Psychiatric/Substance  Use:  - neg psychiatric ROS     Infectious Disease:  - neg infectious disease ROS     Malignancy:  - neg malignancy ROS     Other:  - neg other ROS          Physical Exam    Airway  airway exam normal      Mallampati: II       Respiratory Devices and Support         Dental  no notable dental history         Cardiovascular   cardiovascular exam normal       Rhythm and rate: regular and normal     Pulmonary   pulmonary exam normal        breath sounds clear to auscultation           OUTSIDE LABS:  CBC:   Lab Results   Component Value Date    WBC 4.9 01/29/2021    WBC 4.6 09/18/2020    HGB 13.7 10/06/2021    HGB 14.3 01/29/2021    HCT 42.9 01/29/2021    HCT 43.4 09/18/2020     01/29/2021     09/18/2020     BMP:   Lab Results   Component Value Date     10/06/2021     01/29/2021    POTASSIUM 4.3 10/06/2021    POTASSIUM 3.9 01/29/2021    CHLORIDE 106 10/06/2021    CHLORIDE 107 01/29/2021    CO2 25 10/06/2021    CO2 27 01/29/2021    BUN 8 10/06/2021    BUN 8 01/29/2021    CR 0.83 10/06/2021    CR 0.80 01/29/2021    GLC 74 10/06/2021    GLC 76 01/29/2021     COAGS: No results found for: PTT, INR, FIBR  POC:   Lab Results   Component Value Date    HCG Negative 10/06/2021     HEPATIC:   Lab Results   Component Value Date    ALBUMIN 3.7 10/06/2021    PROTTOTAL 6.7 10/06/2021    ALT 30 10/06/2021    AST 21 10/06/2021    ALKPHOS 36 (L) 10/06/2021    BILITOTAL 0.5 10/06/2021     OTHER:   Lab Results   Component Value Date    ANSHUL 9.5 10/06/2021    TSH 13.88 (H) 10/06/2021    T4 0.61 (L) 10/06/2021       Anesthesia Plan    ASA Status:  2      Anesthesia Type: MAC.      Maintenance: TIVA.        Consents    Anesthesia Plan(s) and associated risks, benefits, and realistic alternatives discussed. Questions answered and patient/representative(s) expressed understanding.     - Discussed with:  Patient         Postoperative Care    Pain management: IV analgesics.   PONV prophylaxis: Ondansetron (or other 5HT-3),  Dexamethasone or Solumedrol     Comments:    The patient understands and accepts the risks of MAC anesthesia including (but not limited to) nausea, vomiting, dizziness, and chipped teeth. I also discussed the possibility of conversion to GAETT/GALMA anesthesia which include hoarse voice, sore throat, and pinched lip or chipped teeth.  Versed/fent  propofol ggt  Decadron/zofran              Cuauhtemoc Porter MD

## 2021-10-18 NOTE — ANESTHESIA POSTPROCEDURE EVALUATION
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    Anesthesia Type:  MAC    Note:     Postop Pain Control: Uneventful            Sign Out: Well controlled pain   PONV: No   Neuro/Psych: Uneventful            Sign Out: Acceptable/Baseline neuro status   Airway/Respiratory: Uneventful            Sign Out: Acceptable/Baseline resp. status   CV/Hemodynamics: Uneventful            Sign Out: Acceptable CV status   Other NRE: NONE   DID A NON-ROUTINE EVENT OCCUR? No           Last vitals:  Vitals Value Taken Time   /63 10/18/21 0800   Temp 97.1  F (36.2  C) 10/18/21 0727   Pulse 74 10/18/21 0812   Resp 16 10/18/21 0800   SpO2 99 % 10/18/21 0812   Vitals shown include unvalidated device data.    Electronically Signed By: Cuauhtemoc Porter MD  October 18, 2021  12:37 PM

## 2021-10-18 NOTE — ANESTHESIA CARE TRANSFER NOTE
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    Anesthesia Type:   General     Note:    Oropharynx: oropharynx clear of all foreign objects and spontaneously breathing  Level of Consciousness: drowsy  Oxygen Supplementation: face mask  Level of Supplemental Oxygen (L/min / FiO2): 4  Independent Airway: airway patency satisfactory and stable  Dentition: dentition unchanged  Vital Signs Stable: post-procedure vital signs reviewed and stable  Report to RN Given: handoff report given  Patient transferred to: Phase II    Handoff Report: Identifed the Patient, Identified the Reponsible Provider, Reviewed the pertinent medical history, Discussed the surgical course, Reviewed Intra-OP anesthesia mangement and issues during anesthesia, Set expectations for post-procedure period and Allowed opportunity for questions and acknowledgement of understanding      Vitals:  Vitals Value Taken Time   /59 10/18/21 0727   Temp 97.1  F (36.2  C) 10/18/21 0727   Pulse 64    Resp 16 10/18/21 0727   SpO2 98 % 10/18/21 0727       Electronically Signed By: SYED PIRES CRNA  October 18, 2021  7:29 AM

## 2021-10-18 NOTE — OP NOTE
Date of surgery: 10/18/2021    Surgeon: Roe Almeida D.P.M.      Preoperative diagnosis: Neuroma second  intermetatarsal space right foot.    Postoperative diagnosis: Same    Procedure: Excision neuroma  second intermetatarsal space right foot    Anesthesia: MAC    Hemostasis: Pneumatic ankle tourniquet 250 mmHg    Specimens: Neuroma sent to pathology    Complications: None    Blood loss: None    Medications: None    Description: The patient was taken to the operating room and placed on the table in supine position. Under local anesthesia 0.5% Marcaine plain and 2% Xylocaine plain in a one-to-one mixture along with IV sedation the right foot was prepped and draped in the usual aseptic manner.  Following exsanguination of the right foot with a Melvin's bandage the tourniquet was inflated and the following  procedure was performed.  Attention was directed to the dorsal aspect of the second intermetatarsal space where a dorsal linear longitudinal skin incision was made approximately 2.5 cm in length.  This incision was deepened via sharp and blunt dissection with care being taken to identify and retract all vital structures. Blunt dissection was carried down to the level of the deep transverse intermetatarsal ligament.  The ligament was then transected.  Once this had been performed a large soft tissue mass was easily visible within the surgical site.  Next utilizing a tenotomy scissor this mass was resected from distal to proximal.  The wound was then inspected to ensure the entire lesion had been removed and this was deemed to be satisfactory.  The wound was then flushed with copious amounts of sterile saline solution.  Deep closure was accomplished utilizing 3-0 Monocryl suture material.  Skin closure was accomplished utilizing 4-0 nylon suture material. A sterile dressing was then applied to the right foot comprising of Betadine ointment, Adaptic, 4 x 4 gauze, 3 inch Luis and Coban.  The tourniquet was then  deflated and normal color returned to all the digits of the right foot.    The patient appeared to tolerate  the procedure and anesthesia well and was transported from the operating room to the recovery room with vital signs stable and neurovascular status intact.

## 2021-10-25 ENCOUNTER — OFFICE VISIT (OUTPATIENT)
Dept: PODIATRY | Facility: CLINIC | Age: 31
End: 2021-10-25
Payer: COMMERCIAL

## 2021-10-25 VITALS — BODY MASS INDEX: 28.85 KG/M2 | WEIGHT: 169 LBS | HEIGHT: 64 IN | OXYGEN SATURATION: 96 % | TEMPERATURE: 97.7 F

## 2021-10-25 DIAGNOSIS — G58.8 NEUROMA DIGITAL NERVE: Primary | ICD-10-CM

## 2021-10-25 PROCEDURE — 99024 POSTOP FOLLOW-UP VISIT: CPT | Performed by: PODIATRIST

## 2021-10-25 ASSESSMENT — PAIN SCALES - GENERAL: PAINLEVEL: MODERATE PAIN (5)

## 2021-10-25 ASSESSMENT — MIFFLIN-ST. JEOR: SCORE: 1466.58

## 2021-10-25 NOTE — LETTER
[address]  [facility] [address]  [phone]    2021    Re: Keyla Ni  [address] [facility] [address]  [phone] (home) [phone] (work)    : 1990      To Whom It May Concern:      Keyla Ni was seen in Clinic 10/25/2021 due to surgery.  She may return to work on 10/25/2021 with limited activities and must wear post op shoe.      Sincerely,        Roe Almeida DPM,

## 2021-10-25 NOTE — LETTER
10/25/2021         RE: Keyla Ni  [address] [facility] [address]        Dear Colleague,    Thank you for referring your patient, Keyla Ni, to the [facility]. Please see a copy of my visit note below.    Subjective findings: The patient return to the clinic today for postop visit #1, 1 week status post excision neuroma second intermetatarsal space right foot.  The patient is in good spirits and she had no complaints.    Objective findings: The dressings were removed and wound margins well coaptated and maintained.  There is no edema, erythema, cellulitis, drainage or bleeding noted.  Neurovascular status is intact.  Vital signs stable.    Assessment: Neuroma second intermetatarsal space right foot    Plan: Applied a Band-Aid over the small incision.  I have instructed the patient to keep the wound clean and dry and return to the clinic in 1 week for postop visit #2 at which time the sutures will be removed.      Again, thank you for allowing me to participate in the care of your patient.        Sincerely,        Roe Almeida DPM

## 2021-10-25 NOTE — PROGRESS NOTES
Subjective findings: The patient return to the clinic today for postop visit #1, 1 week status post excision neuroma second intermetatarsal space right foot.  The patient is in good spirits and she had no complaints.    Objective findings: The dressings were removed and wound margins well coaptated and maintained.  There is no edema, erythema, cellulitis, drainage or bleeding noted.  Neurovascular status is intact.  Vital signs stable.    Assessment: Neuroma second intermetatarsal space right foot    Plan: Applied a Band-Aid over the small incision.  I have instructed the patient to keep the wound clean and dry and return to the clinic in 1 week for postop visit #2 at which time the sutures will be removed.

## 2021-10-25 NOTE — LETTER
[address]  [facility] [address]  [phone]    2021    Re: Keyla Ni  [address] [place] [address]  [phone] (home) [phone] (work)    : 1990      To Whom It May Concern:      Keyla Ni was see in Clinic 10/25/2021 due to surgery.  She may return to work on 10/25/2021 with full duty but needs to wear post op shoe.      Sincerely,      Roe Almeida DPM,

## 2021-10-29 ENCOUNTER — TELEPHONE (OUTPATIENT)
Dept: PODIATRY | Facility: CLINIC | Age: 31
End: 2021-10-29

## 2021-10-29 NOTE — TELEPHONE ENCOUNTER
Patient had surgery with Dr. Almeida on 10/18, and she has been experiencing an increase in pain and swelling within the past week. She states the pain seems to be concentrated in the ball of the foot and it makes it difficult to walk.   OK to  [phone]

## 2021-11-01 ENCOUNTER — OFFICE VISIT (OUTPATIENT)
Dept: PODIATRY | Facility: CLINIC | Age: 31
End: 2021-11-01
Payer: COMMERCIAL

## 2021-11-01 VITALS — HEIGHT: 64 IN | OXYGEN SATURATION: 97 % | HEART RATE: 88 BPM | WEIGHT: 169 LBS | BODY MASS INDEX: 28.85 KG/M2

## 2021-11-01 DIAGNOSIS — G58.8 NEUROMA DIGITAL NERVE: Primary | ICD-10-CM

## 2021-11-01 PROCEDURE — 99024 POSTOP FOLLOW-UP VISIT: CPT | Performed by: PODIATRIST

## 2021-11-01 ASSESSMENT — MIFFLIN-ST. JEOR: SCORE: 1466.58

## 2021-11-01 ASSESSMENT — PAIN SCALES - GENERAL: PAINLEVEL: MILD PAIN (3)

## 2021-11-01 NOTE — PROGRESS NOTES
Subjective findings: The patient return to the clinic today for postop visit #2, 2 weeks status post excision neuroma second intermetatarsal space right foot.  The patient is in good spirits and she had no complaints.     Objective findings: The dressings were removed and wound margins well coaptated and maintained.  There is no edema, erythema, cellulitis, drainage or bleeding noted.  Neurovascular status is intact.  Vital signs stable.     Assessment: Neuroma second intermetatarsal space right foot     Plan: All sutures removed today.  I have instructed the patient to gradually return to normal activities.  She is to return to clinic as needed.   Writer called pt with results.  LM to return call to the office.

## 2021-11-01 NOTE — LETTER
11/1/2021         RE: Keyla Ni  [address] [facility] [address]        Dear Colleague,    Thank you for referring your patient, Keyla Ni, to the [facility]. Please see a copy of my visit note below.    Subjective findings: The patient return to the clinic today for postop visit #2, 2 weeks status post excision neuroma second intermetatarsal space right foot.  The patient is in good spirits and she had no complaints.     Objective findings: The dressings were removed and wound margins well coaptated and maintained.  There is no edema, erythema, cellulitis, drainage or bleeding noted.  Neurovascular status is intact.  Vital signs stable.     Assessment: Neuroma second intermetatarsal space right foot     Plan: All sutures removed today.  I have instructed the patient to gradually return to normal activities.  She is to return to clinic as needed.      Again, thank you for allowing me to participate in the care of your patient.        Sincerely,        Roe Almeida DPM

## 2021-12-07 ENCOUNTER — OFFICE VISIT (OUTPATIENT)
Dept: FAMILY MEDICINE | Facility: CLINIC | Age: 31
End: 2021-12-07
Payer: COMMERCIAL

## 2021-12-07 VITALS
DIASTOLIC BLOOD PRESSURE: 60 MMHG | SYSTOLIC BLOOD PRESSURE: 100 MMHG | WEIGHT: 177.8 LBS | BODY MASS INDEX: 30.52 KG/M2 | HEART RATE: 66 BPM

## 2021-12-07 DIAGNOSIS — R30.0 DYSURIA: ICD-10-CM

## 2021-12-07 DIAGNOSIS — N30.01 ACUTE CYSTITIS WITH HEMATURIA: Primary | ICD-10-CM

## 2021-12-07 LAB
ALBUMIN UR-MCNC: NEGATIVE MG/DL
APPEARANCE UR: ABNORMAL
BACTERIA #/AREA URNS HPF: ABNORMAL /HPF
BILIRUB UR QL STRIP: NEGATIVE
COLOR UR AUTO: YELLOW
GLUCOSE UR STRIP-MCNC: NEGATIVE MG/DL
HGB UR QL STRIP: ABNORMAL
KETONES UR STRIP-MCNC: NEGATIVE MG/DL
LEUKOCYTE ESTERASE UR QL STRIP: ABNORMAL
NITRATE UR QL: NEGATIVE
PH UR STRIP: 6 [PH] (ref 5–8)
RBC #/AREA URNS AUTO: ABNORMAL /HPF
SP GR UR STRIP: 1.01 (ref 1–1.03)
SQUAMOUS #/AREA URNS AUTO: ABNORMAL /LPF
UROBILINOGEN UR STRIP-ACNC: 0.2 E.U./DL
WBC #/AREA URNS AUTO: ABNORMAL /HPF
WBC CLUMPS #/AREA URNS HPF: PRESENT /HPF

## 2021-12-07 PROCEDURE — 87086 URINE CULTURE/COLONY COUNT: CPT | Performed by: NURSE PRACTITIONER

## 2021-12-07 PROCEDURE — 99214 OFFICE O/P EST MOD 30 MIN: CPT | Performed by: NURSE PRACTITIONER

## 2021-12-07 PROCEDURE — 81001 URINALYSIS AUTO W/SCOPE: CPT | Performed by: NURSE PRACTITIONER

## 2021-12-07 PROCEDURE — 87086 URINE CULTURE/COLONY COUNT: CPT | Mod: 91 | Performed by: NURSE PRACTITIONER

## 2021-12-07 RX ORDER — NITROFURANTOIN 25; 75 MG/1; MG/1
100 CAPSULE ORAL 2 TIMES DAILY
Qty: 10 CAPSULE | Refills: 0 | Status: SHIPPED | OUTPATIENT
Start: 2021-12-07 | End: 2021-12-12

## 2021-12-07 NOTE — PROGRESS NOTES
Assessment and Plan:       ICD-10-CM    1. Acute cystitis with hematuria  N30.01 UA with Microscopic - lab collect     UA with Microscopic - lab collect     Urine Microscopic Exam     nitroFURantoin macrocrystal-monohydrate (MACROBID) 100 MG capsule     Urine Culture Aerobic Bacterial - lab collect   2. Dysuria  R30.0 UA with Microscopic - lab collect     UA with Microscopic - lab collect     Urine Microscopic Exam     nitroFURantoin macrocrystal-monohydrate (MACROBID) 100 MG capsule     Urine Culture Aerobic Bacterial - lab collect     Urinalysis is concerning for urinary tract infection.  Will treat with nitrofurantoin 100 mg twice daily for 5 days.  Educated on its indications and side effects.  Urine micro and culture ordered. Discussed symptomatic treatment.  Recommend increasing fluid intake.  If fever develops or symptoms persist or worsen, she is to follow with her PCP.  She is content with the plan.    Subjective:     Keyla is a 31 year old female presenting to the clinic for concerns for urinary tract infection.  Patient developed dysuria yesterday.  She had some pelvic cramping this morning and noted some blood in the urine.  She has been experiencing urinary urgency and frequency.  She has had chills but has not taken her temperature.  She denies low back pain.  She has not tried any over-the-counter products for her symptoms.  She has a history of recurrent urinary tract infections in childhood.  She denies any vaginal discharge or irritation.  She has no concerns for STDs.    Reviewof Systems: A complete 14 point review of systems was obtained and is negative or as stated in the history of present illness.    Social History     Socioeconomic History     Marital status:      Spouse name: Garcia Carreno     Number of children: 0     Years of education: Not on file     Highest education level: Not on file   Occupational History     Occupation: [employer]     Comment: [employer]   Tobacco Use     Smoking  status: Never Smoker     Smokeless tobacco: Never Used   Vaping Use     Vaping Use: Never used   Substance and Sexual Activity     Alcohol use: Yes     Alcohol/week: 3.0 standard drinks     Types: 3 Standard drinks or equivalent per week     Drug use: No     Sexual activity: Yes     Partners: Male     Birth control/protection: Pill   Other Topics Concern     Not on file   Social History Narrative     Not on file     Social Determinants of Health     Financial Resource Strain: Not on file   Food Insecurity: Not on file   Transportation Needs: Not on file   Physical Activity: Not on file   Stress: Not on file   Social Connections: Not on file   Intimate Partner Violence: Not on file   Housing Stability: Not on file       Active Ambulatory Problems     Diagnosis Date Noted     Common Migraine (Without Aura)      Allergic Rhinitis      Insomnia      Hypothyroidism      Depression With Anxiety      Anemia 01/05/2015     Acne 01/16/2017     Gastroesophageal reflux disease without esophagitis 01/28/2021     ASCUS with positive high risk HPV cervical 04/17/2013     Neuroma digital nerve 07/20/2021     Resolved Ambulatory Problems     Diagnosis Date Noted     No Resolved Ambulatory Problems     Past Medical History:   Diagnosis Date     Abnormal Pap smear of cervix 2014     Anemia      Anxiety      Gastroesophageal reflux disease      Major depressive disorder, recurrent episode, unspecified      Unspecified hypothyroidism        Family History   Problem Relation Age of Onset     Asthma Brother      Breast Cancer Maternal Aunt         dx age 61     Depression Father      Snoring Father      Depression Mother      Uterine Cancer Mother      Lung Cancer Maternal Grandmother      Cancer Maternal Grandmother         bladder     Thyroid Disease Paternal Grandmother      Osteoporosis Paternal Grandmother      Chronic Obstructive Pulmonary Disease Paternal Grandmother      Sleep Apnea Sister        Objective:     /60 (BP  Location: Right arm, Patient Position: Sitting, Cuff Size: Adult Regular)   Pulse 66   Wt 80.6 kg (177 lb 12.8 oz)   BMI 30.52 kg/m      Patient is alert, in no obvious distress.   Skin: Warm, dry.    Lungs:  Clear to auscultation. Respirations even and unlabored.  No wheezing or rales noted.   Heart:  Regular rate and rhythm.  No murmurs, S3, S4, gallops, or rubs.    Abdomen: Soft, palpation suprapubic.  No organomegaly. Bowel sounds normoactive. No guarding or masses noted.  CVA tenderness is negative bilaterally.      LABORATORY: Urinalysis ordered showed moderate leukocytes and moderate blood.  Urine micro and culture ordered.

## 2021-12-08 LAB
BACTERIA UR CULT: NO GROWTH
BACTERIA UR CULT: NO GROWTH

## 2021-12-29 ENCOUNTER — MYC MEDICAL ADVICE (OUTPATIENT)
Dept: FAMILY MEDICINE | Facility: CLINIC | Age: 31
End: 2021-12-29
Payer: COMMERCIAL

## 2022-01-05 ENCOUNTER — LAB (OUTPATIENT)
Dept: LAB | Facility: CLINIC | Age: 32
End: 2022-01-05
Payer: COMMERCIAL

## 2022-01-05 DIAGNOSIS — E03.9 ACQUIRED HYPOTHYROIDISM: ICD-10-CM

## 2022-01-05 LAB
T4 FREE SERPL-MCNC: 0.71 NG/DL (ref 0.7–1.8)
TSH SERPL DL<=0.005 MIU/L-ACNC: 5.24 UIU/ML (ref 0.3–5)

## 2022-01-05 PROCEDURE — 84439 ASSAY OF FREE THYROXINE: CPT

## 2022-01-05 PROCEDURE — 36415 COLL VENOUS BLD VENIPUNCTURE: CPT

## 2022-01-05 PROCEDURE — 84443 ASSAY THYROID STIM HORMONE: CPT

## 2022-01-06 DIAGNOSIS — E03.9 ACQUIRED HYPOTHYROIDISM: Primary | ICD-10-CM

## 2022-01-06 RX ORDER — THYROID 120 MG/1
120 TABLET ORAL DAILY
Qty: 60 TABLET | Refills: 0 | Status: SHIPPED | OUTPATIENT
Start: 2022-01-06 | End: 2022-02-22

## 2022-02-18 ENCOUNTER — MYC MEDICAL ADVICE (OUTPATIENT)
Dept: FAMILY MEDICINE | Facility: CLINIC | Age: 32
End: 2022-02-18
Payer: COMMERCIAL

## 2022-02-25 ENCOUNTER — HOSPITAL ENCOUNTER (OUTPATIENT)
Dept: ULTRASOUND IMAGING | Facility: CLINIC | Age: 32
Discharge: HOME | End: 2022-02-25
Attending: OBSTETRICS & GYNECOLOGY | Admitting: OBSTETRICS & GYNECOLOGY
Payer: COMMERCIAL

## 2022-02-25 DIAGNOSIS — Z31.41 FERTILITY TESTING: ICD-10-CM

## 2022-02-25 PROCEDURE — 76830 TRANSVAGINAL US NON-OB: CPT

## 2022-03-18 ENCOUNTER — MYC MEDICAL ADVICE (OUTPATIENT)
Dept: FAMILY MEDICINE | Facility: CLINIC | Age: 32
End: 2022-03-18
Payer: COMMERCIAL

## 2022-03-18 DIAGNOSIS — E03.9 ACQUIRED HYPOTHYROIDISM: Primary | ICD-10-CM

## 2022-03-21 DIAGNOSIS — E03.9 ACQUIRED HYPOTHYROIDISM: ICD-10-CM

## 2022-03-21 RX ORDER — LEVOTHYROXINE SODIUM 200 UG/1
200 TABLET ORAL DAILY
Qty: 60 TABLET | Refills: 0 | Status: SHIPPED | OUTPATIENT
Start: 2022-03-21 | End: 2022-08-24

## 2022-03-21 NOTE — TELEPHONE ENCOUNTER
I did call and speak with patient regarding her message.  She did have a positive pregnancy test last week.  I do think probably switching her levothyroxine for the pregnancy would be appropriate.  There is not a lot of studies on Elza Thyroid in pregnancy and whether or not it is acceptable to be on that.  I will go ahead and change her to Synthroid and will send in prescription for her.  I stressed to her the importance of making sure that we do another thyroid level in about a month to see what that level is.  If she is going to see me for pregnancy we certainly can coordinate that with a visit here otherwise she can do a lab only visit.  I asked her to please MyChart me a message in about a month and I can order the lab only for her thyroid level.  If she is interested in seeing me for pregnancy, she saw Dr. Vega for infertility and is not certain as to which 1 of us she is going to see, I would be happy to see her and I certainly can help her get a appointment.  She should MyChart me that message as well and we will get her in for an appointment for pregnancy confirmation.  All of her questions were answered today.

## 2022-03-22 RX ORDER — LEVOTHYROXINE SODIUM 200 UG/1
TABLET ORAL
Qty: 90 TABLET | OUTPATIENT
Start: 2022-03-22

## 2022-03-22 NOTE — TELEPHONE ENCOUNTER
Refill request addressed 3/21/22.    Sola García RN 03/22/22 1:16 PM    Health Triage Nurse Advisor

## 2022-03-24 ENCOUNTER — LAB REQUISITION (OUTPATIENT)
Dept: LAB | Facility: CLINIC | Age: 32
End: 2022-03-24
Payer: COMMERCIAL

## 2022-03-24 DIAGNOSIS — O26.851 SPOTTING COMPLICATING PREGNANCY, FIRST TRIMESTER: ICD-10-CM

## 2022-03-24 LAB
ABO/RH(D): NORMAL
HCG SERPL-ACNC: 6 MLU/ML (ref 0–4)
SPECIMEN EXPIRATION DATE: NORMAL

## 2022-03-24 PROCEDURE — 84702 CHORIONIC GONADOTROPIN TEST: CPT | Mod: ORL | Performed by: OBSTETRICS & GYNECOLOGY

## 2022-03-24 PROCEDURE — 86901 BLOOD TYPING SEROLOGIC RH(D): CPT | Mod: ORL | Performed by: OBSTETRICS & GYNECOLOGY

## 2022-03-24 PROCEDURE — 36415 COLL VENOUS BLD VENIPUNCTURE: CPT | Mod: ORL | Performed by: OBSTETRICS & GYNECOLOGY

## 2022-04-03 ENCOUNTER — HEALTH MAINTENANCE LETTER (OUTPATIENT)
Age: 32
End: 2022-04-03

## 2022-04-20 ENCOUNTER — MYC MEDICAL ADVICE (OUTPATIENT)
Dept: FAMILY MEDICINE | Facility: CLINIC | Age: 32
End: 2022-04-20
Payer: COMMERCIAL

## 2022-04-20 DIAGNOSIS — E03.9 ACQUIRED HYPOTHYROIDISM: Primary | ICD-10-CM

## 2022-05-19 ENCOUNTER — LAB (OUTPATIENT)
Dept: LAB | Facility: CLINIC | Age: 32
End: 2022-05-19
Payer: COMMERCIAL

## 2022-05-19 DIAGNOSIS — E03.9 ACQUIRED HYPOTHYROIDISM: ICD-10-CM

## 2022-05-19 LAB
T4 FREE SERPL-MCNC: 1.52 NG/DL (ref 0.7–1.8)
TSH SERPL DL<=0.005 MIU/L-ACNC: 0.01 UIU/ML (ref 0.3–5)

## 2022-05-19 PROCEDURE — 36415 COLL VENOUS BLD VENIPUNCTURE: CPT

## 2022-05-19 PROCEDURE — 84439 ASSAY OF FREE THYROXINE: CPT

## 2022-05-19 PROCEDURE — 84443 ASSAY THYROID STIM HORMONE: CPT

## 2022-05-20 DIAGNOSIS — E03.9 ACQUIRED HYPOTHYROIDISM: Primary | ICD-10-CM

## 2022-05-20 RX ORDER — LEVOTHYROXINE SODIUM 150 UG/1
150 TABLET ORAL DAILY
Qty: 90 TABLET | Refills: 0 | Status: SHIPPED | OUTPATIENT
Start: 2022-05-20 | End: 2022-08-20

## 2022-05-24 ENCOUNTER — LAB REQUISITION (OUTPATIENT)
Dept: LAB | Facility: CLINIC | Age: 32
End: 2022-05-24
Payer: COMMERCIAL

## 2022-05-24 DIAGNOSIS — N96 RECURRENT PREGNANCY LOSS: ICD-10-CM

## 2022-05-24 LAB
ABO/RH(D): NORMAL
ANTIBODY SCREEN: NEGATIVE
HCG SERPL-ACNC: 1174 MLU/ML (ref 0–4)
SPECIMEN EXPIRATION DATE: NORMAL

## 2022-05-24 PROCEDURE — 84702 CHORIONIC GONADOTROPIN TEST: CPT | Mod: ORL | Performed by: OBSTETRICS & GYNECOLOGY

## 2022-05-24 PROCEDURE — 36415 COLL VENOUS BLD VENIPUNCTURE: CPT | Mod: ORL | Performed by: OBSTETRICS & GYNECOLOGY

## 2022-05-24 PROCEDURE — 86901 BLOOD TYPING SEROLOGIC RH(D): CPT | Mod: ORL | Performed by: OBSTETRICS & GYNECOLOGY

## 2022-05-26 ENCOUNTER — LAB REQUISITION (OUTPATIENT)
Dept: LAB | Facility: CLINIC | Age: 32
End: 2022-05-26
Payer: COMMERCIAL

## 2022-05-26 DIAGNOSIS — N96 RECURRENT PREGNANCY LOSS: ICD-10-CM

## 2022-05-26 LAB — HCG SERPL-ACNC: 2669 MLU/ML (ref 0–4)

## 2022-05-26 PROCEDURE — 84702 CHORIONIC GONADOTROPIN TEST: CPT | Mod: ORL | Performed by: OBSTETRICS & GYNECOLOGY

## 2022-05-26 PROCEDURE — 36415 COLL VENOUS BLD VENIPUNCTURE: CPT | Mod: ORL | Performed by: OBSTETRICS & GYNECOLOGY

## 2022-05-27 ENCOUNTER — LAB REQUISITION (OUTPATIENT)
Dept: LAB | Facility: CLINIC | Age: 32
End: 2022-05-27
Payer: COMMERCIAL

## 2022-05-27 DIAGNOSIS — N96 RECURRENT PREGNANCY LOSS: ICD-10-CM

## 2022-05-28 LAB — HCG SERPL-ACNC: 5722 MLU/ML (ref 0–4)

## 2022-05-28 PROCEDURE — 84702 CHORIONIC GONADOTROPIN TEST: CPT | Mod: ORL | Performed by: OBSTETRICS & GYNECOLOGY

## 2022-05-28 PROCEDURE — 36415 COLL VENOUS BLD VENIPUNCTURE: CPT | Mod: ORL | Performed by: OBSTETRICS & GYNECOLOGY

## 2022-06-30 LAB
HIV1+2 AB SERPL QL IA: NONREACTIVE
RUBELLA ANTIBODY IGG (EXTERNAL): NORMAL
TREPONEMA PALLIDUM ANTIBODY (EXTERNAL): NONREACTIVE

## 2022-08-19 DIAGNOSIS — E03.9 ACQUIRED HYPOTHYROIDISM: ICD-10-CM

## 2022-08-20 RX ORDER — LEVOTHYROXINE SODIUM 150 UG/1
TABLET ORAL
Qty: 14 TABLET | Refills: 0 | Status: SHIPPED | OUTPATIENT
Start: 2022-08-20 | End: 2022-08-24

## 2022-08-20 NOTE — TELEPHONE ENCOUNTER
"Routing refill request to provider for review/approval because:  Labs out of range:  TSH    Last Written Prescription Date:  %/20/2022  Last Fill Quantity: 90,  # refills: 0   Last office visit provider:  12/7/2021     Requested Prescriptions   Pending Prescriptions Disp Refills     levothyroxine (SYNTHROID/LEVOTHROID) 150 MCG tablet [Pharmacy Med Name: LEVOTHYROXINE 0.150MG (150MCG) TAB] 90 tablet 0     Sig: TAKE 1 TABLET(150 MCG) BY MOUTH DAILY       Thyroid Protocol Failed - 8/19/2022  9:08 PM        Failed - Normal TSH on file in past 12 months     Recent Labs   Lab Test 05/19/22  1159   TSH 0.01*              Passed - Patient is 12 years or older        Passed - Recent (12 mo) or future (30 days) visit within the authorizing provider's specialty     Patient has had an office visit with the authorizing provider or a provider within the authorizing providers department within the previous 12 mos or has a future within next 30 days. See \"Patient Info\" tab in inbasket, or \"Choose Columns\" in Meds & Orders section of the refill encounter.              Passed - Medication is active on med list        Passed - No active pregnancy on record     If patient is pregnant or has had a positive pregnancy test, please check TSH.          Passed - No positive pregnancy test in past 12 months     If patient is pregnant or has had a positive pregnancy test, please check TSH.               Liza Sanon RN 08/19/22 11:35 PM  "

## 2022-08-20 NOTE — TELEPHONE ENCOUNTER
Please call patient and let her know that I did send in a prescription for 14-day supply of her thyroid medication.  She was due to come back in for recheck of her thyroid levels at the beginning of July and I do not see that that recheck have this happened.  I also see by lab results that she may be pregnant.  If that is the case she absolutely needs to have her thyroid rechecked as her thyroid level was definitely off the last time it was checked in May and we definitely want her thyroid level to be normal if she is pregnant.    Please assist patient with scheduling a lab only visit for thyroid level sometime within the next 2 weeks.

## 2022-08-24 ENCOUNTER — MYC MEDICAL ADVICE (OUTPATIENT)
Dept: FAMILY MEDICINE | Facility: CLINIC | Age: 32
End: 2022-08-24

## 2022-08-24 DIAGNOSIS — E03.9 ACQUIRED HYPOTHYROIDISM: ICD-10-CM

## 2022-08-24 RX ORDER — LEVOTHYROXINE SODIUM 150 UG/1
150 TABLET ORAL DAILY
Qty: 90 TABLET | Refills: 0 | Status: SHIPPED | OUTPATIENT
Start: 2022-08-24 | End: 2023-01-04

## 2022-08-24 NOTE — TELEPHONE ENCOUNTER
Patient returning call. Patient had T[facility] about 2 weeks ago [facility]. She states that it was 1.66 when they checked. Patient will screen shot results and send via [email or web address].

## 2022-08-25 NOTE — TELEPHONE ENCOUNTER
I did talk with patient and she is on the 150 mcg a day of thyroid medication.  That is the prescription that was sent to the pharmacy.  Patient is due end of January and is following with [facility] for her OB care.

## 2022-09-22 ENCOUNTER — OFFICE VISIT (OUTPATIENT)
Dept: FAMILY MEDICINE | Facility: CLINIC | Age: 32
End: 2022-09-22

## 2022-09-22 VITALS
BODY MASS INDEX: 31.67 KG/M2 | TEMPERATURE: 98.2 F | RESPIRATION RATE: 16 BRPM | WEIGHT: 184.5 LBS | OXYGEN SATURATION: 99 % | DIASTOLIC BLOOD PRESSURE: 70 MMHG | SYSTOLIC BLOOD PRESSURE: 108 MMHG | HEART RATE: 73 BPM

## 2022-09-22 DIAGNOSIS — L02.214 GROIN ABSCESS: Primary | ICD-10-CM

## 2022-09-22 PROCEDURE — 99213 OFFICE O/P EST LOW 20 MIN: CPT | Performed by: FAMILY MEDICINE

## 2022-09-22 RX ORDER — CEPHALEXIN 500 MG/1
500 CAPSULE ORAL 3 TIMES DAILY
Qty: 21 CAPSULE | Refills: 0 | Status: SHIPPED | OUTPATIENT
Start: 2022-09-22 | End: 2022-09-29

## 2022-09-22 NOTE — PROGRESS NOTES
lAssessment:       Groin abscess           Plan:     Patient with left-sided groin abscess that she is already opened up and drained.  There continues to be a fair amount of redness and tenderness and will start on cephalexin.  Continue to use hot packs and encourage drainage.  Follow-up with increasing pain, redness, swelling.        Subjective:       32 year old female presents for evaluation of a 3 to 4-day history of a small painful cyst in her left groin that she thinks is gotten infected.  She was able to squeeze it and drain some pus out of it and wants to make sure she does not need antibiotics.  No fevers or chills or any other complaints or concerns today.    Patient Active Problem List   Diagnosis     Common Migraine (Without Aura)     Allergic Rhinitis     Insomnia     Hypothyroidism     Depression With Anxiety     Anemia     Acne     Gastroesophageal reflux disease without esophagitis     ASCUS with positive high risk HPV cervical     Neuroma digital nerve       Past Medical History:   Diagnosis Date     Abnormal Pap smear of cervix 2014    nl since     Acne      Allergic rhinitis      Anemia      Anxiety      ASCUS with positive high risk HPV cervical 4/17/2013 2010, 2011 NIL paps 4/17/13 ASCUS, +HPV (unknown risk factor) 10/22/14 NIL 4/28/17 NIL pap, neg HPV 1/29/21 NIL pap, neg HPV. Routine screening     Gastroesophageal reflux disease      Major depressive disorder, recurrent episode, unspecified      Unspecified hypothyroidism        Past Surgical History:   Procedure Laterality Date     EXCISE NOLASCO'S NEUROMA FOOT Right 10/18/2021    Procedure: EXCISION NEUROMA second intermetatarsal space right foot;  Surgeon: Roe Almeida DPM;  Location: [facility] OR     WISDOM TOOTH EXTRACTION  2011       Current Outpatient Medications   Medication     cetirizine (ZYRTEC) 10 MG tablet     levothyroxine (SYNTHROID/LEVOTHROID) 150 MCG tablet     azelastine (ASTELIN) 137 mcg (0.1 %) nasal spray      escitalopram (LEXAPRO) 10 MG tablet     traZODone (DESYREL) 50 MG tablet     No current facility-administered medications for this visit.       Allergies   Allergen Reactions     Cat Hair Std Allergenic Ext [Cat Hair Extract] Unknown     Grass Unknown     House Dust [Dust Mite Extract] Unknown     Mold Extracts [Molds & Smuts] Unknown     Other Environmental Allergy      Pollen [Pollen Extract] Unknown     Trees Unknown     Venom-Honey Bee [Bee Venom] Swelling       Family History   Problem Relation Age of Onset     Asthma Brother      Breast Cancer Maternal Aunt         dx age 61     Depression Father      Snoring Father      Depression Mother      Uterine Cancer Mother      Lung Cancer Maternal Grandmother      Cancer Maternal Grandmother         bladder     Thyroid Disease Paternal Grandmother      Osteoporosis Paternal Grandmother      Chronic Obstructive Pulmonary Disease Paternal Grandmother      Sleep Apnea Sister        Social History     Socioeconomic History     Marital status:      Spouse name: Garcia Carreno     Number of children: 0     Years of education: None     Highest education level: None   Occupational History     Occupation: [employer]     Comment: [employer]   Tobacco Use     Smoking status: Never Smoker     Smokeless tobacco: Never Used   Vaping Use     Vaping Use: Never used   Substance and Sexual Activity     Alcohol use: Yes     Alcohol/week: 3.0 standard drinks     Types: 3 Standard drinks or equivalent per week     Drug use: No     Sexual activity: Yes     Partners: Male     Birth control/protection: Pill         Review of Systems  Pertinent items are noted in HPI.      Objective:     /70   Pulse 73   Temp 98.2  F (36.8  C) (Oral)   Resp 16   Wt 83.7 kg (184 lb 8 oz)   SpO2 99%   BMI 31.67 kg/m       General appearance: alert, appears stated age and cooperative  Skin: Patient with 1.5 cm erythematous, tender, indurated cyst in the left groin.  No significant fluctuance noted.   No drainage noted.     This note has been dictated using voice recognition software. Any grammatical or context distortions are unintentional and inherent to the software

## 2022-09-22 NOTE — PATIENT INSTRUCTIONS
Take antibiotics as prescribed.  Continue to hot pack the affected area.  If it opens up and drains more that is just fine.  Please follow-up if getting significantly larger, more red, more tender, or overall not improving over the 3 to 4 days.

## 2022-10-02 ENCOUNTER — HEALTH MAINTENANCE LETTER (OUTPATIENT)
Age: 32
End: 2022-10-02

## 2022-12-29 DIAGNOSIS — E03.9 ACQUIRED HYPOTHYROIDISM: ICD-10-CM

## 2022-12-30 NOTE — TELEPHONE ENCOUNTER
Please call patient and find out if they have been following her thyroid level in her OB clinic.  She is pregnant with a due date at the end of January as far as I understand.  The last time she had a thyroid level drawn was in May and I have asked her to have it rechecked a number of times and she has not done that through our clinic.  I am hoping that she has had a thyroid level recently at her OB/GYN clinic and if so please see if she can get fax that result to us or give us that result so that at least we know that she has had her thyroid levels redrawn.  I am hesitant to renew her thyroid medication again without knowing what her levels are.    Please contact patient with the above information and then send the message back to me so I can address the refill request.

## 2022-12-30 NOTE — TELEPHONE ENCOUNTER
"Routing refill request to provider for review/approval because:  Labs out of range:  tsh    Last Written Prescription Date:  8/24/22  Last Fill Quantity: 90,  # refills: 0   Last office visit provider:  12/7/21     Requested Prescriptions   Pending Prescriptions Disp Refills     levothyroxine (SYNTHROID/LEVOTHROID) 150 MCG tablet [Pharmacy Med Name: LEVOTHYROXINE 0.150MG (150MCG) TAB] 90 tablet 0     Sig: TAKE 1 TABLET(150 MCG) BY MOUTH DAILY       Thyroid Protocol Failed - 12/29/2022 10:09 PM        Failed - Normal TSH on file in past 12 months     Recent Labs   Lab Test 05/19/22  1159   TSH 0.01*              Passed - Patient is 12 years or older        Passed - Recent (12 mo) or future (30 days) visit within the authorizing provider's specialty     Patient has had an office visit with the authorizing provider or a provider within the authorizing providers department within the previous 12 mos or has a future within next 30 days. See \"Patient Info\" tab in inbasket, or \"Choose Columns\" in Meds & Orders section of the refill encounter.              Passed - Medication is active on med list        Passed - No active pregnancy on record     If patient is pregnant or has had a positive pregnancy test, please check TSH.          Passed - No positive pregnancy test in past 12 months     If patient is pregnant or has had a positive pregnancy test, please check TSH.               Ruidoso Downs, Kinsey, RN 12/30/22 4:40 PM  "

## 2023-01-02 NOTE — TELEPHONE ENCOUNTER
Left message to call back for: Patient  Information to relay to patient: Message below per Dr. Landrum.    Laury Pedersen CMA.

## 2023-01-03 ENCOUNTER — MYC MEDICAL ADVICE (OUTPATIENT)
Dept: FAMILY MEDICINE | Facility: CLINIC | Age: 33
End: 2023-01-03

## 2023-01-03 DIAGNOSIS — E03.9 ACQUIRED HYPOTHYROIDISM: ICD-10-CM

## 2023-01-04 RX ORDER — LEVOTHYROXINE SODIUM 150 UG/1
TABLET ORAL
Qty: 90 TABLET | Refills: 0 | OUTPATIENT
Start: 2023-01-04

## 2023-01-04 RX ORDER — LEVOTHYROXINE SODIUM 150 UG/1
150 TABLET ORAL DAILY
Qty: 90 TABLET | Refills: 0 | Status: SHIPPED | OUTPATIENT
Start: 2023-01-04 | End: 2023-03-16

## 2023-01-04 NOTE — TELEPHONE ENCOUNTER
Medication: levothyroxine 150mcg  Last Date Filled: 8/24/22  Last appointment addressing medication: 10/6/21  Last B/P:  BP Readings from Last 3 Encounters:   09/22/22 108/70   12/07/21 100/60   10/18/21 109/63     Last labs pertaining to refill: 11/2/22 (see attachments in mychart message)      Pend medication and associate diagnosis before routing to Provider for review.       If patient has not been seen in over 1 year, pend 30 day supply and notify patient they are due for an appointment before any further refills.

## 2023-01-04 NOTE — TELEPHONE ENCOUNTER
Patient had TSH done at her OB clinic on 11/2/2022 and it was 1.23.  We will refill prescription for levothyroxine at the 150 mcg which is what she has been taking.    Please call patient and let her know that this has been sent to the pharmacy.

## 2023-01-29 ENCOUNTER — HOSPITAL ENCOUNTER (OUTPATIENT)
Facility: CLINIC | Age: 33
Discharge: HOME | End: 2023-01-29
Attending: OBSTETRICS & GYNECOLOGY | Admitting: OBSTETRICS & GYNECOLOGY
Payer: COMMERCIAL

## 2023-01-29 LAB — RUPTURE OF FETAL MEMBRANES BY ROM PLUS: NEGATIVE

## 2023-01-29 PROCEDURE — 84112 EVAL AMNIOTIC FLUID PROTEIN: CPT | Performed by: OBSTETRICS & GYNECOLOGY

## 2023-01-29 PROCEDURE — G0463 HOSPITAL OUTPT CLINIC VISIT: HCPCS

## 2023-01-29 RX ORDER — LIDOCAINE 40 MG/G
CREAM TOPICAL
Status: DISCONTINUED | OUTPATIENT
Start: 2023-01-29 | End: 2023-01-29 | Stop reason: HOSPADM

## 2023-01-29 ASSESSMENT — ACTIVITIES OF DAILY LIVING (ADL): ADLS_ACUITY_SCORE: 31

## 2023-01-29 NOTE — PROGRESS NOTES
Dr. Simmons updated about negative ROM plus.  No new fluid or bleeding noted.  EFM shows category 1 tracing.  No contractions are painful.  Per Dr. Simmons, patient is ok to discharge to home and follow up for her scheduled induction Tuesday.

## 2023-01-29 NOTE — DISCHARGE INSTRUCTIONS
Discharge Instruction for Undelivered Patients      You were seen for: Labor Assessment, Membrane Assessment, Bleeding Assessment, and Fetal Assessment  We Consulted: Dr. Simmons  You had (Test or Medicine):ROM plus which determined you are not ruptured     Diet:   Drink 8 to 12 glasses of liquids (milk, juice, water) every day.  You may eat meals and snacks.     Activity:  Call your doctor or nurse midwife if your baby is moving less than usual.     Call your provider if you notice:  Swelling in your face or increased swelling in your hands or legs.  Headaches that are not relieved by Tylenol (acetaminophen).  Changes in your vision (blurring: seeing spots or stars.)  Nausea (sick to your stomach) and vomiting (throwing up).   Weight gain of 5 pounds or more per week.  Heartburn that doesn't go away.  Signs of bladder infection: pain when you urinate (use the toilet), need to go more often and more urgently.  The bag of downing (rupture of membranes) breaks, or you notice leaking in your underwear.  Bright red blood in your underwear.  Abdominal (lower belly) or stomach pain.  For first baby: Contractions (tightening) less than 5 minutes apart for one hour or more.  Second (plus) baby: Contractions (tightening) less than 10 minutes apart and getting stronger.  *If less than 34 weeks: Contractions (tightening) more than 6 times in one hour.  Increase or change in vaginal discharge (note the color and amount)  Other:     Follow-up:  On Tuesday for your scheduled  induction. Call the hospital at 0600 to verify plan for admission.

## 2023-01-30 ENCOUNTER — HOSPITAL ENCOUNTER (INPATIENT)
Facility: CLINIC | Age: 33
LOS: 1 days | Discharge: HOME HEALTH | End: 2023-01-31
Attending: OBSTETRICS & GYNECOLOGY | Admitting: OBSTETRICS & GYNECOLOGY
Payer: COMMERCIAL

## 2023-01-30 ENCOUNTER — ANESTHESIA (OUTPATIENT)
Dept: OBGYN | Facility: CLINIC | Age: 33
End: 2023-01-30
Payer: COMMERCIAL

## 2023-01-30 ENCOUNTER — ANESTHESIA EVENT (OUTPATIENT)
Dept: OBGYN | Facility: CLINIC | Age: 33
End: 2023-01-30
Payer: COMMERCIAL

## 2023-01-30 LAB
ABO/RH(D): NORMAL
ANTIBODY SCREEN: NEGATIVE
BASOPHILS # BLD AUTO: 0 10E3/UL (ref 0–0.2)
BASOPHILS NFR BLD AUTO: 0 %
EOSINOPHIL # BLD AUTO: 0.1 10E3/UL (ref 0–0.7)
EOSINOPHIL NFR BLD AUTO: 1 %
ERYTHROCYTE [DISTWIDTH] IN BLOOD BY AUTOMATED COUNT: 14.2 % (ref 10–15)
HCT VFR BLD AUTO: 31.7 % (ref 35–47)
HGB BLD-MCNC: 10.2 G/DL (ref 11.7–15.7)
HOLD SPECIMEN: NORMAL
IMM GRANULOCYTES # BLD: 0.1 10E3/UL
IMM GRANULOCYTES NFR BLD: 1 %
LYMPHOCYTES # BLD AUTO: 1.4 10E3/UL (ref 0.8–5.3)
LYMPHOCYTES NFR BLD AUTO: 14 %
MCH RBC QN AUTO: 26 PG (ref 26.5–33)
MCHC RBC AUTO-ENTMCNC: 32.2 G/DL (ref 31.5–36.5)
MCV RBC AUTO: 81 FL (ref 78–100)
MONOCYTES # BLD AUTO: 0.5 10E3/UL (ref 0–1.3)
MONOCYTES NFR BLD AUTO: 5 %
NEUTROPHILS # BLD AUTO: 7.8 10E3/UL (ref 1.6–8.3)
NEUTROPHILS NFR BLD AUTO: 79 %
NRBC # BLD AUTO: 0 10E3/UL
NRBC BLD AUTO-RTO: 0 /100
PLATELET # BLD AUTO: 176 10E3/UL (ref 150–450)
RBC # BLD AUTO: 3.93 10E6/UL (ref 3.8–5.2)
SPECIMEN EXPIRATION DATE: NORMAL
T PALLIDUM AB SER QL: NONREACTIVE
WBC # BLD AUTO: 9.8 10E3/UL (ref 4–11)

## 2023-01-30 PROCEDURE — 258N000003 HC RX IP 258 OP 636: Performed by: OBSTETRICS & GYNECOLOGY

## 2023-01-30 PROCEDURE — 86901 BLOOD TYPING SEROLOGIC RH(D): CPT | Performed by: OBSTETRICS & GYNECOLOGY

## 2023-01-30 PROCEDURE — 250N000009 HC RX 250: Performed by: ANESTHESIOLOGY

## 2023-01-30 PROCEDURE — 00HU33Z INSERTION OF INFUSION DEVICE INTO SPINAL CANAL, PERCUTANEOUS APPROACH: ICD-10-PCS | Performed by: ANESTHESIOLOGY

## 2023-01-30 PROCEDURE — 250N000013 HC RX MED GY IP 250 OP 250 PS 637: Performed by: OBSTETRICS & GYNECOLOGY

## 2023-01-30 PROCEDURE — 999N000157 HC STATISTIC RCP TIME EA 10 MIN

## 2023-01-30 PROCEDURE — 250N000009 HC RX 250: Performed by: OBSTETRICS & GYNECOLOGY

## 2023-01-30 PROCEDURE — 86780 TREPONEMA PALLIDUM: CPT | Performed by: OBSTETRICS & GYNECOLOGY

## 2023-01-30 PROCEDURE — 120N000001 HC R&B MED SURG/OB

## 2023-01-30 PROCEDURE — 250N000011 HC RX IP 250 OP 636: Performed by: OBSTETRICS & GYNECOLOGY

## 2023-01-30 PROCEDURE — 250N000011 HC RX IP 250 OP 636: Performed by: ANESTHESIOLOGY

## 2023-01-30 PROCEDURE — 722N000001 HC LABOR CARE VAGINAL DELIVERY SINGLE

## 2023-01-30 PROCEDURE — 370N000003 HC ANESTHESIA WARD SERVICE

## 2023-01-30 PROCEDURE — 0KQM0ZZ REPAIR PERINEUM MUSCLE, OPEN APPROACH: ICD-10-PCS | Performed by: OBSTETRICS & GYNECOLOGY

## 2023-01-30 PROCEDURE — 85025 COMPLETE CBC W/AUTO DIFF WBC: CPT | Performed by: OBSTETRICS & GYNECOLOGY

## 2023-01-30 PROCEDURE — 999N000016 HC STATISTIC ATTENDANCE AT DELIVERY

## 2023-01-30 PROCEDURE — 36415 COLL VENOUS BLD VENIPUNCTURE: CPT | Performed by: OBSTETRICS & GYNECOLOGY

## 2023-01-30 PROCEDURE — 3E0R3BZ INTRODUCTION OF ANESTHETIC AGENT INTO SPINAL CANAL, PERCUTANEOUS APPROACH: ICD-10-PCS | Performed by: ANESTHESIOLOGY

## 2023-01-30 RX ORDER — BISACODYL 10 MG
10 SUPPOSITORY, RECTAL RECTAL DAILY PRN
Status: DISCONTINUED | OUTPATIENT
Start: 2023-01-30 | End: 2023-01-31 | Stop reason: HOSPADM

## 2023-01-30 RX ORDER — KETOROLAC TROMETHAMINE 30 MG/ML
30 INJECTION, SOLUTION INTRAMUSCULAR; INTRAVENOUS
Status: DISCONTINUED | OUTPATIENT
Start: 2023-01-30 | End: 2023-01-31 | Stop reason: HOSPADM

## 2023-01-30 RX ORDER — MISOPROSTOL 200 UG/1
400 TABLET ORAL
Status: DISCONTINUED | OUTPATIENT
Start: 2023-01-30 | End: 2023-01-30 | Stop reason: HOSPADM

## 2023-01-30 RX ORDER — METOCLOPRAMIDE HYDROCHLORIDE 5 MG/ML
10 INJECTION INTRAMUSCULAR; INTRAVENOUS EVERY 6 HOURS PRN
Status: DISCONTINUED | OUTPATIENT
Start: 2023-01-30 | End: 2023-01-30 | Stop reason: HOSPADM

## 2023-01-30 RX ORDER — OXYTOCIN 10 [USP'U]/ML
10 INJECTION, SOLUTION INTRAMUSCULAR; INTRAVENOUS
Status: DISCONTINUED | OUTPATIENT
Start: 2023-01-30 | End: 2023-01-30 | Stop reason: HOSPADM

## 2023-01-30 RX ORDER — ACETAMINOPHEN 325 MG/1
650 TABLET ORAL EVERY 4 HOURS PRN
Status: DISCONTINUED | OUTPATIENT
Start: 2023-01-30 | End: 2023-01-31 | Stop reason: HOSPADM

## 2023-01-30 RX ORDER — ONDANSETRON 2 MG/ML
4 INJECTION INTRAMUSCULAR; INTRAVENOUS EVERY 6 HOURS PRN
Status: DISCONTINUED | OUTPATIENT
Start: 2023-01-30 | End: 2023-01-30 | Stop reason: HOSPADM

## 2023-01-30 RX ORDER — MODIFIED LANOLIN
OINTMENT (GRAM) TOPICAL
Status: DISCONTINUED | OUTPATIENT
Start: 2023-01-30 | End: 2023-01-31 | Stop reason: HOSPADM

## 2023-01-30 RX ORDER — LIDOCAINE 40 MG/G
CREAM TOPICAL
Status: DISCONTINUED | OUTPATIENT
Start: 2023-01-30 | End: 2023-01-30 | Stop reason: HOSPADM

## 2023-01-30 RX ORDER — CARBOPROST TROMETHAMINE 250 UG/ML
250 INJECTION, SOLUTION INTRAMUSCULAR
Status: DISCONTINUED | OUTPATIENT
Start: 2023-01-30 | End: 2023-01-31 | Stop reason: HOSPADM

## 2023-01-30 RX ORDER — FENTANYL/ROPIVACAINE/NS/PF 2MCG/ML-.1
PLASTIC BAG, INJECTION (ML) EPIDURAL
Status: DISCONTINUED | OUTPATIENT
Start: 2023-01-30 | End: 2023-01-30 | Stop reason: HOSPADM

## 2023-01-30 RX ORDER — DOCUSATE SODIUM 100 MG/1
100 CAPSULE, LIQUID FILLED ORAL DAILY
Status: DISCONTINUED | OUTPATIENT
Start: 2023-01-30 | End: 2023-01-31 | Stop reason: HOSPADM

## 2023-01-30 RX ORDER — MISOPROSTOL 200 UG/1
800 TABLET ORAL
Status: DISCONTINUED | OUTPATIENT
Start: 2023-01-30 | End: 2023-01-30 | Stop reason: HOSPADM

## 2023-01-30 RX ORDER — METHYLERGONOVINE MALEATE 0.2 MG/ML
200 INJECTION INTRAVENOUS
Status: DISCONTINUED | OUTPATIENT
Start: 2023-01-30 | End: 2023-01-30 | Stop reason: HOSPADM

## 2023-01-30 RX ORDER — LIDOCAINE HYDROCHLORIDE AND EPINEPHRINE 15; 5 MG/ML; UG/ML
INJECTION, SOLUTION EPIDURAL PRN
Status: DISCONTINUED | OUTPATIENT
Start: 2023-01-30 | End: 2023-01-30

## 2023-01-30 RX ORDER — IBUPROFEN 800 MG/1
800 TABLET, FILM COATED ORAL
Status: DISCONTINUED | OUTPATIENT
Start: 2023-01-30 | End: 2023-01-31 | Stop reason: HOSPADM

## 2023-01-30 RX ORDER — IBUPROFEN 800 MG/1
800 TABLET, FILM COATED ORAL EVERY 6 HOURS PRN
Status: DISCONTINUED | OUTPATIENT
Start: 2023-01-30 | End: 2023-01-31 | Stop reason: HOSPADM

## 2023-01-30 RX ORDER — OXYTOCIN/0.9 % SODIUM CHLORIDE 30/500 ML
340 PLASTIC BAG, INJECTION (ML) INTRAVENOUS CONTINUOUS PRN
Status: DISCONTINUED | OUTPATIENT
Start: 2023-01-30 | End: 2023-01-30 | Stop reason: HOSPADM

## 2023-01-30 RX ORDER — FENTANYL CITRATE-0.9 % NACL/PF 10 MCG/ML
100 PLASTIC BAG, INJECTION (ML) INTRAVENOUS EVERY 5 MIN PRN
Status: DISCONTINUED | OUTPATIENT
Start: 2023-01-30 | End: 2023-01-30 | Stop reason: HOSPADM

## 2023-01-30 RX ORDER — LEVOTHYROXINE SODIUM 150 UG/1
150 TABLET ORAL DAILY
Status: DISCONTINUED | OUTPATIENT
Start: 2023-01-30 | End: 2023-01-31 | Stop reason: HOSPADM

## 2023-01-30 RX ORDER — PRENATAL VIT/IRON FUM/FOLIC AC 27MG-0.8MG
1 TABLET ORAL DAILY
COMMUNITY
End: 2024-10-02

## 2023-01-30 RX ORDER — OXYTOCIN 10 [USP'U]/ML
10 INJECTION, SOLUTION INTRAMUSCULAR; INTRAVENOUS
Status: DISCONTINUED | OUTPATIENT
Start: 2023-01-30 | End: 2023-01-31 | Stop reason: HOSPADM

## 2023-01-30 RX ORDER — PRENATAL VIT/IRON FUM/FOLIC AC 27MG-0.8MG
1 TABLET ORAL DAILY
Status: DISCONTINUED | OUTPATIENT
Start: 2023-01-30 | End: 2023-01-31 | Stop reason: HOSPADM

## 2023-01-30 RX ORDER — NALBUPHINE HYDROCHLORIDE 10 MG/ML
2.5-5 INJECTION, SOLUTION INTRAMUSCULAR; INTRAVENOUS; SUBCUTANEOUS EVERY 6 HOURS PRN
Status: DISCONTINUED | OUTPATIENT
Start: 2023-01-30 | End: 2023-01-31 | Stop reason: HOSPADM

## 2023-01-30 RX ORDER — ONDANSETRON 4 MG/1
4 TABLET, ORALLY DISINTEGRATING ORAL EVERY 6 HOURS PRN
Status: DISCONTINUED | OUTPATIENT
Start: 2023-01-30 | End: 2023-01-30 | Stop reason: HOSPADM

## 2023-01-30 RX ORDER — CARBOPROST TROMETHAMINE 250 UG/ML
250 INJECTION, SOLUTION INTRAMUSCULAR
Status: DISCONTINUED | OUTPATIENT
Start: 2023-01-30 | End: 2023-01-30 | Stop reason: HOSPADM

## 2023-01-30 RX ORDER — OXYTOCIN/0.9 % SODIUM CHLORIDE 30/500 ML
340 PLASTIC BAG, INJECTION (ML) INTRAVENOUS CONTINUOUS PRN
Status: DISCONTINUED | OUTPATIENT
Start: 2023-01-30 | End: 2023-01-31 | Stop reason: HOSPADM

## 2023-01-30 RX ORDER — NALOXONE HYDROCHLORIDE 0.4 MG/ML
0.4 INJECTION, SOLUTION INTRAMUSCULAR; INTRAVENOUS; SUBCUTANEOUS
Status: DISCONTINUED | OUTPATIENT
Start: 2023-01-30 | End: 2023-01-30 | Stop reason: HOSPADM

## 2023-01-30 RX ORDER — CITRIC ACID/SODIUM CITRATE 334-500MG
30 SOLUTION, ORAL ORAL
Status: DISCONTINUED | OUTPATIENT
Start: 2023-01-30 | End: 2023-01-30 | Stop reason: HOSPADM

## 2023-01-30 RX ORDER — LEVOTHYROXINE SODIUM 150 UG/1
TABLET ORAL
COMMUNITY
Start: 2023-01-15 | End: 2023-03-16

## 2023-01-30 RX ORDER — METHYLERGONOVINE MALEATE 0.2 MG/ML
200 INJECTION INTRAVENOUS
Status: DISCONTINUED | OUTPATIENT
Start: 2023-01-30 | End: 2023-01-31 | Stop reason: HOSPADM

## 2023-01-30 RX ORDER — MISOPROSTOL 200 UG/1
800 TABLET ORAL
Status: DISCONTINUED | OUTPATIENT
Start: 2023-01-30 | End: 2023-01-31 | Stop reason: HOSPADM

## 2023-01-30 RX ORDER — OXYTOCIN/0.9 % SODIUM CHLORIDE 30/500 ML
100-340 PLASTIC BAG, INJECTION (ML) INTRAVENOUS CONTINUOUS PRN
Status: DISCONTINUED | OUTPATIENT
Start: 2023-01-30 | End: 2023-01-31 | Stop reason: HOSPADM

## 2023-01-30 RX ORDER — PROCHLORPERAZINE 25 MG
25 SUPPOSITORY, RECTAL RECTAL EVERY 12 HOURS PRN
Status: DISCONTINUED | OUTPATIENT
Start: 2023-01-30 | End: 2023-01-30 | Stop reason: HOSPADM

## 2023-01-30 RX ORDER — SODIUM CHLORIDE, SODIUM LACTATE, POTASSIUM CHLORIDE, CALCIUM CHLORIDE 600; 310; 30; 20 MG/100ML; MG/100ML; MG/100ML; MG/100ML
INJECTION, SOLUTION INTRAVENOUS CONTINUOUS
Status: DISCONTINUED | OUTPATIENT
Start: 2023-01-30 | End: 2023-01-31 | Stop reason: HOSPADM

## 2023-01-30 RX ORDER — HYDROCORTISONE 25 MG/G
CREAM TOPICAL 3 TIMES DAILY PRN
Status: DISCONTINUED | OUTPATIENT
Start: 2023-01-30 | End: 2023-01-31 | Stop reason: HOSPADM

## 2023-01-30 RX ORDER — PROCHLORPERAZINE MALEATE 10 MG
10 TABLET ORAL EVERY 6 HOURS PRN
Status: DISCONTINUED | OUTPATIENT
Start: 2023-01-30 | End: 2023-01-30 | Stop reason: HOSPADM

## 2023-01-30 RX ORDER — METOCLOPRAMIDE 10 MG/1
10 TABLET ORAL EVERY 6 HOURS PRN
Status: DISCONTINUED | OUTPATIENT
Start: 2023-01-30 | End: 2023-01-30 | Stop reason: HOSPADM

## 2023-01-30 RX ORDER — MISOPROSTOL 200 UG/1
400 TABLET ORAL
Status: DISCONTINUED | OUTPATIENT
Start: 2023-01-30 | End: 2023-01-31 | Stop reason: HOSPADM

## 2023-01-30 RX ORDER — NALOXONE HYDROCHLORIDE 0.4 MG/ML
0.2 INJECTION, SOLUTION INTRAMUSCULAR; INTRAVENOUS; SUBCUTANEOUS
Status: DISCONTINUED | OUTPATIENT
Start: 2023-01-30 | End: 2023-01-30 | Stop reason: HOSPADM

## 2023-01-30 RX ORDER — BUPIVACAINE HYDROCHLORIDE 2.5 MG/ML
INJECTION, SOLUTION EPIDURAL; INFILTRATION; INTRACAUDAL
Status: COMPLETED | OUTPATIENT
Start: 2023-01-30 | End: 2023-01-30

## 2023-01-30 RX ADMIN — LEVOTHYROXINE SODIUM 150 MCG: 0.15 TABLET ORAL at 13:19

## 2023-01-30 RX ADMIN — SODIUM CHLORIDE, POTASSIUM CHLORIDE, SODIUM LACTATE AND CALCIUM CHLORIDE: 600; 310; 30; 20 INJECTION, SOLUTION INTRAVENOUS at 05:38

## 2023-01-30 RX ADMIN — LIDOCAINE HYDROCHLORIDE,EPINEPHRINE BITARTRATE 3 ML: 15; .005 INJECTION, SOLUTION EPIDURAL; INFILTRATION; INTRACAUDAL; PERINEURAL at 04:50

## 2023-01-30 RX ADMIN — LIDOCAINE HYDROCHLORIDE,EPINEPHRINE BITARTRATE 2 ML: 15; .005 INJECTION, SOLUTION EPIDURAL; INFILTRATION; INTRACAUDAL; PERINEURAL at 04:53

## 2023-01-30 RX ADMIN — WITCH HAZEL: 500 SOLUTION RECTAL; TOPICAL at 11:32

## 2023-01-30 RX ADMIN — DOCUSATE SODIUM 100 MG: 100 CAPSULE, LIQUID FILLED ORAL at 10:31

## 2023-01-30 RX ADMIN — BENZOCAINE AND LEVOMENTHOL: 200; 5 SPRAY TOPICAL at 11:32

## 2023-01-30 RX ADMIN — PRENATAL VIT W/ FE FUMARATE-FA TAB 27-0.8 MG 1 TABLET: 27-0.8 TAB at 13:19

## 2023-01-30 RX ADMIN — Medication 340 ML/HR: at 09:20

## 2023-01-30 RX ADMIN — IBUPROFEN 800 MG: 800 TABLET ORAL at 16:27

## 2023-01-30 RX ADMIN — ACETAMINOPHEN 650 MG: 325 TABLET ORAL at 11:32

## 2023-01-30 RX ADMIN — BUPIVACAINE HYDROCHLORIDE 5 ML: 2.5 INJECTION, SOLUTION EPIDURAL; INFILTRATION; INTRACAUDAL at 04:56

## 2023-01-30 RX ADMIN — SODIUM CHLORIDE, POTASSIUM CHLORIDE, SODIUM LACTATE AND CALCIUM CHLORIDE 1000 ML: 600; 310; 30; 20 INJECTION, SOLUTION INTRAVENOUS at 03:40

## 2023-01-30 RX ADMIN — ACETAMINOPHEN 650 MG: 325 TABLET ORAL at 15:41

## 2023-01-30 RX ADMIN — IBUPROFEN 800 MG: 800 TABLET ORAL at 22:56

## 2023-01-30 RX ADMIN — Medication: at 04:55

## 2023-01-30 RX ADMIN — ACETAMINOPHEN 650 MG: 325 TABLET ORAL at 20:32

## 2023-01-30 RX ADMIN — KETOROLAC TROMETHAMINE 30 MG: 30 INJECTION, SOLUTION INTRAMUSCULAR at 10:31

## 2023-01-30 ASSESSMENT — ACTIVITIES OF DAILY LIVING (ADL)
ADLS_ACUITY_SCORE: 18
WALKING_OR_CLIMBING_STAIRS_DIFFICULTY: NO
ADLS_ACUITY_SCORE: 18
CONCENTRATING,_REMEMBERING_OR_MAKING_DECISIONS_DIFFICULTY: NO
DRESSING/BATHING_DIFFICULTY: NO
DIFFICULTY_COMMUNICATING: NO
ADLS_ACUITY_SCORE: 18
DOING_ERRANDS_INDEPENDENTLY_DIFFICULTY: NO
ADLS_ACUITY_SCORE: 18
DIFFICULTY_EATING/SWALLOWING: NO
FALL_HISTORY_WITHIN_LAST_SIX_MONTHS: NO
WEAR_GLASSES_OR_BLIND: NO
TOILETING_ISSUES: NO
ADLS_ACUITY_SCORE: 18
HEARING_DIFFICULTY_OR_DEAF: NO
ADLS_ACUITY_SCORE: 18
ADLS_ACUITY_SCORE: 18
CHANGE_IN_FUNCTIONAL_STATUS_SINCE_ONSET_OF_CURRENT_ILLNESS/INJURY: NO
ADLS_ACUITY_SCORE: 18
ADLS_ACUITY_SCORE: 31
ADLS_ACUITY_SCORE: 18
ADLS_ACUITY_SCORE: 18

## 2023-01-30 NOTE — PLAN OF CARE
VSS through recovery. Fundus is firm and midline; scant vaginal bleeding. Pain managed with toradol and tylenol. Patient is eating and drinking. Up to bathroom at 1100; voided 200 mL. 2nd degree tear; ice on and medicated pads in place. Continue to monitor

## 2023-01-30 NOTE — ANESTHESIA PREPROCEDURE EVALUATION
Anesthesia Pre-Procedure Evaluation    Patient: Keyla Carreno   MRN: [id number] : 1990        Procedure : * No procedures listed *          No past medical history on file.   No past surgical history on file.   No Known Allergies   Social History     Tobacco Use     Smoking status: Not on file     Smokeless tobacco: Not on file   Substance Use Topics     Alcohol use: Not on file      Wt Readings from Last 1 Encounters:   23 89.4 kg (197 lb)        Anesthesia Evaluation            ROS/MED HX  ENT/Pulmonary:  - neg pulmonary ROS     Neurologic:  - neg neurologic ROS     Cardiovascular:  - neg cardiovascular ROS     METS/Exercise Tolerance:     Hematologic:  - neg hematologic  ROS     Musculoskeletal:  - neg musculoskeletal ROS     GI/Hepatic:  - neg GI/hepatic ROS     Renal/Genitourinary:  - neg Renal ROS     Endo:     (+) Obesity,     Psychiatric/Substance Use:  - neg psychiatric ROS     Infectious Disease:  - neg infectious disease ROS     Malignancy:  - neg malignancy ROS     Other:      (+) Possibly pregnant, ,         Physical Exam    Airway        Mallampati: II   TM distance: > 3 FB   Neck ROM: full   Mouth opening: > 3 cm    Respiratory Devices and Support         Dental    unable to assess        Cardiovascular          Rhythm and rate: regular and normal     Pulmonary           breath sounds clear to auscultation           OUTSIDE LABS:  CBC: No results found for: WBC, HGB, HCT, PLT  BMP: No results found for: NA, POTASSIUM, CHLORIDE, CO2, BUN, CR, GLC  COAGS: No results found for: PTT, INR, FIBR  POC: No results found for: BGM, HCG, HCGS  HEPATIC: No results found for: ALBUMIN, PROTTOTAL, ALT, AST, GGT, ALKPHOS, BILITOTAL, BILIDIRECT, ARCADIO  OTHER: No results found for: PH, LACT, A1C, ANSHUL, PHOS, MAG, LIPASE, AMYLASE, TSH, T4, T3, CRP, SED    Anesthesia Plan    ASA Status:  2      Anesthesia Type: Epidural.              Consents    Anesthesia Plan(s) and associated risks, benefits, and  realistic alternatives discussed. Questions answered and patient/representative(s) expressed understanding.    - Discussed:     - Discussed with:  Patient         Postoperative Care            Comments:                Juanjose Wagner MD

## 2023-01-30 NOTE — PROGRESS NOTES
Progress Note:    VSS. Up ad bowen independently. Fundus is firm and midline; scant vaginal bleeding. Voiding adequately. Pain managed with tylenol and ibuprofen. 2nd degree laceration; medicated pads and spray used; ice pack in place. Tolerating regular diet; denies N/V. PIV; SL. Patient plans on pumping and bottle feeding; supplementing with donor milk. Pump is bedside ready to go when patient is. I reviewed the education folder with parents as well. Continue to monitor

## 2023-01-30 NOTE — L&D DELIVERY NOTE
Vaginal Delivery Note    Name: Keyla Carreno  : 1990  MRN: [id number]      PRE DELIVERY DIAGNOSIS  1) 32 year old  2 Para 0010 at 40w4d     2) GBS negative  3) Marginal cord insertion    POST DELIVERY DIAGNOSIS  1) 32 year old  @ 40w4d  2) Delivery of a viable female, apgars 9/9, weight pending    Procedure: VAVD    Augmentation: no               Indication:  Induction: no                       Indication:       Monitors: external     ROM: SROM  Fluid Type: meconium stained    Labor Analgesia/Anesthesia: epidural    Cord pH obtained: yes  Placenta submitted to Pathology: no    Description of procedure:   32 year old  with PNC w/ ATWS and pregnancy complicated by marginal cord insertion presented to L&D with labor .  Her hospital course was complicated by fetal heart rate abnormalities following SROM and with pushing.  Patient progressed to complete spontaneously.      Infant spontaneously delivered over an 2nd degree laceration.  Infant delivered in TATIANA position.    Nuchal cord: no     Shoulder Dystocia: No  Operative Vaginal Delivery: Yes. Kiwi bell was placed. Palpation around the bell confirmed position and made sure that no vaginal tissue was pulled in. Vacuum was used over 2.5 contractions with about 60 seconds total pulling time. There were two pop-offs. Once the head was delivered, the vacuum was removed.     Placenta spontaneously delivered grossly intact with 3 vessel cord.    Laceration was repaired in the normal fashion using epidural using 3-0 rapide.      Infant:  Live, vigorous infant was handed to mom.    Delivery was complicated by nothing.  Interventions included routine postpartum pitocin and fundal massage.  Total estimated blood loss (mL): 225     Mother and Infant stable.    Hanh Simmons MD    2023 9:37 AM

## 2023-01-30 NOTE — ANESTHESIA PROCEDURE NOTES
"Epidural catheter Procedure Note    Pre-Procedure   Staff -        Anesthesiologist:  Juanjose Wagner MD       Performed By: anesthesiologist       Location: OB       Procedure Start/Stop Times: 1/30/2023 4:42 AM and 1/30/2023 5:01 AM       Pre-Anesthestic Checklist: patient identified, risks and benefits discussed, informed consent, monitors and equipment checked and pre-op evaluation  Timeout:       Correct Patient: Yes        Correct Procedure: Yes        Correct Site: Yes        Correct Position: Yes   Procedure Documentation  Procedure: epidural catheter       Diagnosis: labor pain       Patient Position: sitting       Patient Prep/Sterile Barriers: sterile gloves, mask, patient draped       Skin prep: Chloraprep       Local skin infiltrated with 2 mL of 1% lidocaine.        Insertion Site: L3-4. (midline approach).       Technique: LORT saline        Needle Type: Meniga       Needle Gauge: 18.        Needle Length (Inches): 3.5        Catheter: 18 G.          Catheter threaded easily.         6 cm epidural space.           # of attempts: 1 and  # of redirects:  0    Assessment/Narrative         Paresthesias: No.       Test dose of 3 mL lidocaine 1.5% w/ 1:200,000 epinephrine at 04:50 CST.         Test dose negative, 3 minutes after injection, for signs of intravascular, subdural, or intrathecal injection.       Insertion/Infusion Method: LORT saline       Aspiration negative for Heme or CSF via Epidural Catheter.    Medication(s) Administered   0.25% Bupivacaine PF (Epidural) - EPIDURAL   5 mL - 1/30/2023 4:56:00 AM  0.1% ropivacaine + 2 mcg/mL fentaNYL in NS - EPIDURAL   10 mL - 1/30/2023 4:59:00 AM  Medication Administration Time: 1/30/2023 4:42 AM      FOR [facility] ([facility] [place] [place]) ONLY:   Pain Team Contact information: please page the Pain Team Via [employer]. Search \"Pain\". During daytime hours, please page the attending first. At night please page the resident first.    "

## 2023-01-30 NOTE — H&P
[facility] Labor and Delivery History and Physical    Keyla Carreno MRN# [id number]   Age: 32 year old YOB: 1990     Date of Admission:  2023    Primary care provider: Hanh Simmons           Chief Complaint:   Keyla Carreno is a 32 year old female  who is 40w4d pregnant and being admitted for active labor management. She presented early this morning and was about 6cm. After epidural she was rechecked and found to bed 8cm. Shortly thereafter she had SROM with meconium and progressed to complete. She is mostly comfortable with epidural but does feel pressure with contractions.           Pregnancy history:     OBSTETRIC HISTORY:    OB History    Para Term  AB Living   1 0 0 0 0 0   SAB IAB Ectopic Multiple Live Births   0 0 0 0 0      # Outcome Date GA Lbr Teddy/2nd Weight Sex Delivery Anes PTL Lv   1 Current                EDC: Estimated Date of Delivery: 23  Prenatal complications: marginal cord insertion    Prenatal Labs:   Lab Results   Component Value Date    AS Negative 2023       GBS Status: GBS negative      Medication Prior to Admission  Medications Prior to Admission   Medication Sig Dispense Refill Last Dose     levothyroxine (SYNTHROID/LEVOTHROID) 150 MCG tablet    2023 at 0900     Prenatal Vit-Fe Fumarate-FA (PRENATAL MULTIVITAMIN W/IRON) 27-0.8 MG tablet Take 1 tablet by mouth daily   2023 at 1100   .        Maternal Past Medical History:   No past medical history on file.                       Review of Systems:   The Review of Systems is negative other than noted in the HPI          Physical Exam:   Vitals were reviewed  Temp: 98.2  F (36.8  C) Temp src: Oral BP: 129/72     Resp: 16        Gen: NAD  SVE: C/C/+1  TOCO: q2-3  FHT: Cat 2                     Assessment:   Keyla Carreno is a 40w4d pregnant female admitted with active labor management.  Fetal and maternal well being reassuring.          Plan:    Admit - see IP orders  Just began pushing. Anticipate .     Hanh Simmons MD

## 2023-01-31 VITALS
BODY MASS INDEX: 32.82 KG/M2 | TEMPERATURE: 98.2 F | WEIGHT: 197 LBS | DIASTOLIC BLOOD PRESSURE: 75 MMHG | HEIGHT: 65 IN | OXYGEN SATURATION: 98 % | RESPIRATION RATE: 16 BRPM | SYSTOLIC BLOOD PRESSURE: 122 MMHG | HEART RATE: 77 BPM

## 2023-01-31 PROCEDURE — 250N000013 HC RX MED GY IP 250 OP 250 PS 637: Performed by: OBSTETRICS & GYNECOLOGY

## 2023-01-31 RX ORDER — ACETAMINOPHEN 325 MG/1
325-650 TABLET ORAL EVERY 4 HOURS PRN
COMMUNITY
Start: 2023-01-31 | End: 2023-10-25

## 2023-01-31 RX ORDER — IBUPROFEN 200 MG
800 TABLET ORAL EVERY 6 HOURS PRN
COMMUNITY
Start: 2023-01-31 | End: 2023-10-25

## 2023-01-31 RX ADMIN — PRENATAL VIT W/ FE FUMARATE-FA TAB 27-0.8 MG 1 TABLET: 27-0.8 TAB at 09:56

## 2023-01-31 RX ADMIN — DOCUSATE SODIUM 100 MG: 100 CAPSULE, LIQUID FILLED ORAL at 09:56

## 2023-01-31 RX ADMIN — ACETAMINOPHEN 650 MG: 325 TABLET ORAL at 09:56

## 2023-01-31 RX ADMIN — ACETAMINOPHEN 650 MG: 325 TABLET ORAL at 04:28

## 2023-01-31 RX ADMIN — IBUPROFEN 800 MG: 800 TABLET ORAL at 04:33

## 2023-01-31 RX ADMIN — ACETAMINOPHEN 650 MG: 325 TABLET ORAL at 00:31

## 2023-01-31 RX ADMIN — IBUPROFEN 800 MG: 800 TABLET ORAL at 10:29

## 2023-01-31 ASSESSMENT — ACTIVITIES OF DAILY LIVING (ADL)
ADLS_ACUITY_SCORE: 18

## 2023-01-31 NOTE — PLAN OF CARE
Problem: Plan of Care - These are the overarching goals to be used throughout the patient stay.    Goal: Optimal Comfort and Wellbeing  Intervention: Provide Person-Centered Care  Recent Flowsheet Documentation  Taken 1/31/2023 0030 by Javier Yang RN  Trust Relationship/Rapport:   care explained   choices provided   emotional support provided     Problem: Postpartum (Vaginal Delivery)  Goal: Hemostasis  Outcome: Progressing   Patient vital signs stable. Ambulating independently. Managing pain with Ibuprofen and Tylenol. Will continue to monitor and follow plan of care.

## 2023-01-31 NOTE — ANESTHESIA POSTPROCEDURE EVALUATION
Patient: Keyla Carreno    Procedure: * No procedures listed *       Anesthesia Type:  Epidural    Note:     Postop Pain Control: Uneventful            Sign Out: Well controlled pain   PONV: No   Neuro/Psych: Uneventful            Sign Out: Acceptable/Baseline neuro status   Airway/Respiratory: Uneventful            Sign Out: Acceptable/Baseline resp. status   CV/Hemodynamics: Uneventful            Sign Out: Acceptable CV status; No obvious hypovolemia; No obvious fluid overload   Other NRE: NONE   DID A NON-ROUTINE EVENT OCCUR?     Event details/Postop Comments:      Chart review wnl           Last vitals:  There were no vitals filed for this visit.    Electronically Signed By: Dayanna Farrar MD  January 31, 2023  3:45 PM

## 2023-01-31 NOTE — DISCHARGE INSTRUCTIONS

## 2023-01-31 NOTE — PROGRESS NOTES
Received report from SIDRA Cheek RN. Assume pt care. Pt sleeping. Garcia at bedside holding infant. No needs voiced. Call light within reach.

## 2023-01-31 NOTE — DISCHARGE SUMMARY
[facility] Discharge Summary    Keyla Carreno MRN# [id number]   Age: 32 year old YOB: 1990     Date of Admission:  2023  Date of Discharge::  2023  Admitting Physician:  Hanh Simmons MD  Discharge Physician:  Tong Bonilla MD     Home clinic: [facility]          Admission Diagnoses:   Labor          Discharge Diagnosis:     Normal spontaneous vaginal delivery  Intrauterine pregnancy at 40.4 weeks gestation          Procedures:     Procedure(s):             Medications Prior to Admission:     Medications Prior to Admission   Medication Sig Dispense Refill Last Dose     levothyroxine (SYNTHROID/LEVOTHROID) 150 MCG tablet    2023 at 0900     Prenatal Vit-Fe Fumarate-FA (PRENATAL MULTIVITAMIN W/IRON) 27-0.8 MG tablet Take 1 tablet by mouth daily   2023 at 1100             Discharge Medications:     Current Discharge Medication List      START taking these medications    Details   acetaminophen (TYLENOL) 325 MG tablet Take 1-2 tablets (325-650 mg) by mouth every 4 hours as needed for mild pain    Associated Diagnoses: Normal delivery      ibuprofen (ADVIL/MOTRIN) 200 MG tablet Take 4 tablets (800 mg) by mouth every 6 hours as needed for other or moderate pain (4-6) (cramping)    Associated Diagnoses: Normal delivery         CONTINUE these medications which have NOT CHANGED    Details   levothyroxine (SYNTHROID/LEVOTHROID) 150 MCG tablet       Prenatal Vit-Fe Fumarate-FA (PRENATAL MULTIVITAMIN W/IRON) 27-0.8 MG tablet Take 1 tablet by mouth daily                   Consultations:   No consultations were requested during this admission          Hospital Course:   See admission H&P and delivery summary for details. The patient's hospital course was unremarkable.  On discharge, her pain was well controlled. Vaginal bleeding/lochia is appropriate for postpartum state.  She is voiding without difficulty, ambulating well and  tolerating a normal diet.  Vital signs are stable on the date of discharge, baby is doing well.     Last hemoglobin:   Hemoglobin   Date Value Ref Range Status   01/30/2023 10.2 (L) 11.7 - 15.7 g/dL Final           Discharge Instructions and Follow-Up:     Discharge diet: Regular   Discharge activity: Pelvic rest: abstain from intercourse and do not use tampons for 6 week(s)   Discharge follow-up: Follow-up in office for routine postpartum care at 6 weeks, sooner with any concerns or issues   Wound care: Drink plenty of fluids           Discharge Disposition:     Discharged to home      Attestation:  I have reviewed today's vital signs, notes, medications, labs.    Tong Bonilla MD

## 2023-03-16 ENCOUNTER — MYC MEDICAL ADVICE (OUTPATIENT)
Dept: FAMILY MEDICINE | Facility: CLINIC | Age: 33
End: 2023-03-16
Payer: COMMERCIAL

## 2023-03-16 DIAGNOSIS — E03.9 ACQUIRED HYPOTHYROIDISM: ICD-10-CM

## 2023-03-16 RX ORDER — LEVOTHYROXINE SODIUM 150 UG/1
150 TABLET ORAL DAILY
Qty: 60 TABLET | Refills: 0 | Status: SHIPPED | OUTPATIENT
Start: 2023-03-16 | End: 2023-08-07

## 2023-03-17 RX ORDER — LEVOTHYROXINE SODIUM 150 UG/1
TABLET ORAL
Qty: 90 TABLET | OUTPATIENT
Start: 2023-03-17

## 2023-04-20 ENCOUNTER — LAB (OUTPATIENT)
Dept: LAB | Facility: CLINIC | Age: 33
End: 2023-04-20
Payer: COMMERCIAL

## 2023-04-20 DIAGNOSIS — E03.9 ACQUIRED HYPOTHYROIDISM: ICD-10-CM

## 2023-04-20 DIAGNOSIS — E03.9 ACQUIRED HYPOTHYROIDISM: Primary | ICD-10-CM

## 2023-04-20 LAB
T4 FREE SERPL-MCNC: 2.04 NG/DL (ref 0.9–1.7)
TSH SERPL DL<=0.005 MIU/L-ACNC: 0.03 UIU/ML (ref 0.3–4.2)

## 2023-04-20 PROCEDURE — 84439 ASSAY OF FREE THYROXINE: CPT

## 2023-04-20 PROCEDURE — 84443 ASSAY THYROID STIM HORMONE: CPT

## 2023-04-20 PROCEDURE — 36415 COLL VENOUS BLD VENIPUNCTURE: CPT

## 2023-04-20 RX ORDER — LEVOTHYROXINE SODIUM 125 UG/1
125 TABLET ORAL DAILY
Qty: 60 TABLET | Refills: 0 | Status: SHIPPED | OUTPATIENT
Start: 2023-04-20 | End: 2023-08-07

## 2023-04-21 RX ORDER — LEVOTHYROXINE SODIUM 125 UG/1
TABLET ORAL
Qty: 90 TABLET | OUTPATIENT
Start: 2023-04-21

## 2023-04-21 NOTE — TELEPHONE ENCOUNTER
Please call patient and let her know that I did send in a 60 day supply of the new thyroid dosage yesterday however she needs to have labs drawn prior to a 90 day supply and thus the 90 day supply was denied.

## 2023-04-21 NOTE — TELEPHONE ENCOUNTER
"Routing refill request to provider for review/approval because:  Labs out of range:  TSH .3  Patient needs to be seen because it has been more than 1 year since last office visit.    Last Written Prescription Date:  4/20/2023  Last Fill Quantity: 60,  # refills: 0   Last office visit provider:  12/7/2021     Requested Prescriptions   Pending Prescriptions Disp Refills     levothyroxine (SYNTHROID/LEVOTHROID) 125 MCG tablet [Pharmacy Med Name: LEVOTHYROXINE 0.125MG (125MCG) TAB] 90 tablet      Sig: TAKE 1 TABLET(125 MCG) BY MOUTH DAILY       Thyroid Protocol Failed - 4/21/2023 12:42 PM        Failed - Recent (12 mo) or future (30 days) visit within the authorizing provider's specialty     Patient has had an office visit with the authorizing provider or a provider within the authorizing providers department within the previous 12 mos or has a future within next 30 days. See \"Patient Info\" tab in inbasket, or \"Choose Columns\" in Meds & Orders section of the refill encounter.              Failed - Normal TSH on file in past 12 months     Recent Labs   Lab Test 04/20/23  0944   TSH 0.03*              Passed - Patient is 12 years or older        Passed - Medication is active on med list        Passed - No active pregnancy on record     If patient is pregnant or has had a positive pregnancy test, please check TSH.          Passed - No positive pregnancy test in past 12 months     If patient is pregnant or has had a positive pregnancy test, please check TSH.               Staci Lassiter RN 04/21/23 12:45 PM  "

## 2023-05-20 ENCOUNTER — HEALTH MAINTENANCE LETTER (OUTPATIENT)
Age: 33
End: 2023-05-20

## 2023-08-07 ENCOUNTER — OFFICE VISIT (OUTPATIENT)
Dept: FAMILY MEDICINE | Facility: CLINIC | Age: 33
End: 2023-08-07
Payer: COMMERCIAL

## 2023-08-07 VITALS
RESPIRATION RATE: 16 BRPM | SYSTOLIC BLOOD PRESSURE: 100 MMHG | HEIGHT: 65 IN | OXYGEN SATURATION: 97 % | TEMPERATURE: 98 F | WEIGHT: 177 LBS | BODY MASS INDEX: 29.49 KG/M2 | HEART RATE: 72 BPM | DIASTOLIC BLOOD PRESSURE: 64 MMHG

## 2023-08-07 DIAGNOSIS — N39.3 FEMALE STRESS INCONTINENCE: ICD-10-CM

## 2023-08-07 DIAGNOSIS — F34.1 DYSTHYMIC DISORDER: ICD-10-CM

## 2023-08-07 DIAGNOSIS — E03.9 ACQUIRED HYPOTHYROIDISM: Primary | ICD-10-CM

## 2023-08-07 LAB — TSH SERPL DL<=0.005 MIU/L-ACNC: 16.6 UIU/ML (ref 0.3–4.2)

## 2023-08-07 PROCEDURE — 84443 ASSAY THYROID STIM HORMONE: CPT | Performed by: FAMILY MEDICINE

## 2023-08-07 PROCEDURE — 36415 COLL VENOUS BLD VENIPUNCTURE: CPT | Performed by: FAMILY MEDICINE

## 2023-08-07 PROCEDURE — 99214 OFFICE O/P EST MOD 30 MIN: CPT | Performed by: FAMILY MEDICINE

## 2023-08-07 RX ORDER — METHYLPREDNISOLONE 4 MG
TABLET, DOSE PACK ORAL
COMMUNITY
Start: 2023-08-01 | End: 2023-10-25

## 2023-08-07 RX ORDER — LEVOTHYROXINE SODIUM 125 UG/1
125 TABLET ORAL DAILY
Qty: 30 TABLET | Refills: 3 | Status: SHIPPED | OUTPATIENT
Start: 2023-08-07 | End: 2023-10-25

## 2023-08-07 ASSESSMENT — ANXIETY QUESTIONNAIRES
IF YOU CHECKED OFF ANY PROBLEMS ON THIS QUESTIONNAIRE, HOW DIFFICULT HAVE THESE PROBLEMS MADE IT FOR YOU TO DO YOUR WORK, TAKE CARE OF THINGS AT HOME, OR GET ALONG WITH OTHER PEOPLE: SOMEWHAT DIFFICULT
GAD7 TOTAL SCORE: 12
1. FEELING NERVOUS, ANXIOUS, OR ON EDGE: MORE THAN HALF THE DAYS
GAD7 TOTAL SCORE: 12
3. WORRYING TOO MUCH ABOUT DIFFERENT THINGS: MORE THAN HALF THE DAYS
5. BEING SO RESTLESS THAT IT IS HARD TO SIT STILL: SEVERAL DAYS
7. FEELING AFRAID AS IF SOMETHING AWFUL MIGHT HAPPEN: SEVERAL DAYS
4. TROUBLE RELAXING: MORE THAN HALF THE DAYS
6. BECOMING EASILY ANNOYED OR IRRITABLE: MORE THAN HALF THE DAYS
2. NOT BEING ABLE TO STOP OR CONTROL WORRYING: MORE THAN HALF THE DAYS

## 2023-08-07 ASSESSMENT — PATIENT HEALTH QUESTIONNAIRE - PHQ9
10. IF YOU CHECKED OFF ANY PROBLEMS, HOW DIFFICULT HAVE THESE PROBLEMS MADE IT FOR YOU TO DO YOUR WORK, TAKE CARE OF THINGS AT HOME, OR GET ALONG WITH OTHER PEOPLE: VERY DIFFICULT
SUM OF ALL RESPONSES TO PHQ QUESTIONS 1-9: 11
SUM OF ALL RESPONSES TO PHQ QUESTIONS 1-9: 11

## 2023-08-07 NOTE — PROGRESS NOTES
"  Assessment & Plan     Acquired hypothyroidism  Continue with current dose of levothyroxine 125 mcg.  Check TSH today.  Adjust dose as needed.  She did have her dose adjusted slightly during her recent pregnancy.  - levothyroxine (SYNTHROID/LEVOTHROID) 125 MCG tablet; Take 1 tablet (125 mcg) by mouth daily  - TSH    Depression With Anxiety  Patient reports increased anxiety and irritability.  She did not like side effects from the vaccine.  She will start sertraline 50 mcg daily.  Increase as needed.  Follow-up in about a month.  - sertraline (ZOLOFT) 50 MG tablet; Take 1 tablet (50 mg) by mouth daily    Female stress incontinence  Referral for pelvic floor therapy  - Physical Therapy Referral; Future             BMI:   Estimated body mass index is 29.45 kg/m  as calculated from the following:    Height as of this encounter: 1.651 m (5' 5\").    Weight as of this encounter: 80.3 kg (177 lb).           Gene Vásquez MD  [facility]    Jenifer Ramires is a 33 year old, presenting for the following health issues:  Recheck Medication        8/7/2023     7:48 AM   Additional Questions   Roomed by Deanna       History of Present Illness       Mental Health Follow-up:  Patient presents to follow-up on Depression & Anxiety.Patient's depression since last visit has been:  Worse  The patient is not having other symptoms associated with depression.  Patient's anxiety since last visit has been:  Worse  The patient is having other symptoms associated with anxiety.  Any significant life events: other  Patient is feeling anxious or having panic attacks.  Patient has no concerns about alcohol or drug use.    Hypothyroidism:     Since last visit, patient describes the following symptoms::  Anxiety, Depression, Dry skin, Fatigue, Hair loss and Weight gain    Weight gain::  6-10 lbs.    She eats 4 or more servings of fruits and vegetables daily.She consumes 2 sweetened beverage(s) daily.She " "exercises with enough effort to increase her heart rate 9 or less minutes per day.  She exercises with enough effort to increase her heart rate 3 or less days per week. She is missing 1 dose(s) of medications per week.     Patient is here for follow-up on her hypothyroidism.  She is due for a TSH.  She recently had dose changed from 150 mcg to 125 mcg her dose had been adjusted during pregnancy.  She does not report any hypothyroid symptoms.    She also is having trouble with irritability related to anxiety.  She has been treated for anxiety and depression intermittently in the past.  She stopped taking antidepressants prior to her pregnancy.  She does not report any significant postpartum issues.    Since her pregnancy she has had mild stress incontinence.  She would like to pursue pelvic floor therapy.          Review of Systems   Constitutional, HEENT, cardiovascular, pulmonary, gi and gu systems are negative, except as otherwise noted.      Objective    /64 (BP Location: Right arm, Patient Position: Sitting)   Pulse 72   Temp 98  F (36.7  C) (Tympanic)   Resp 16   Ht 1.651 m (5' 5\")   Wt 80.3 kg (177 lb)   LMP  (LMP Unknown)   SpO2 97%   Breastfeeding No   BMI 29.45 kg/m    Body mass index is 29.45 kg/m .  Physical Exam   GENERAL: healthy, alert and no distress  NECK: no adenopathy, no asymmetry, masses, or scars and thyroid normal to palpation  RESP: lungs clear to auscultation - no rales, rhonchi or wheezes  CV: regular rate and rhythm, normal S1 S2, no S3 or S4, no murmur, click or rub, no peripheral edema and peripheral pulses strong  MS: no gross musculoskeletal defects noted, no edema  PSYCH: mentation appears normal, affect normal/bright                      "

## 2023-10-16 ENCOUNTER — MYC MEDICAL ADVICE (OUTPATIENT)
Dept: FAMILY MEDICINE | Facility: CLINIC | Age: 33
End: 2023-10-16
Payer: COMMERCIAL

## 2023-10-16 DIAGNOSIS — E03.9 ACQUIRED HYPOTHYROIDISM: Primary | ICD-10-CM

## 2023-10-16 RX ORDER — LEVOTHYROXINE SODIUM 150 UG/1
150 TABLET ORAL DAILY
Qty: 30 TABLET | Refills: 3 | Status: ON HOLD | OUTPATIENT
Start: 2023-10-16 | End: 2024-06-25

## 2023-10-16 NOTE — TELEPHONE ENCOUNTER
Prescription approved per [facility] Refill Protocol.    Last office visit: 8/7/2023

## 2023-10-25 ENCOUNTER — OFFICE VISIT (OUTPATIENT)
Dept: FAMILY MEDICINE | Facility: CLINIC | Age: 33
End: 2023-10-25
Payer: COMMERCIAL

## 2023-10-25 VITALS
SYSTOLIC BLOOD PRESSURE: 112 MMHG | OXYGEN SATURATION: 99 % | DIASTOLIC BLOOD PRESSURE: 72 MMHG | HEART RATE: 69 BPM | BODY MASS INDEX: 30.49 KG/M2 | WEIGHT: 183 LBS | RESPIRATION RATE: 16 BRPM | TEMPERATURE: 98.5 F | HEIGHT: 65 IN

## 2023-10-25 DIAGNOSIS — F34.1 DYSTHYMIC DISORDER: ICD-10-CM

## 2023-10-25 DIAGNOSIS — E03.9 ACQUIRED HYPOTHYROIDISM: Primary | ICD-10-CM

## 2023-10-25 LAB
T4 FREE SERPL-MCNC: 0.73 NG/DL (ref 0.9–1.7)
TSH SERPL DL<=0.005 MIU/L-ACNC: 20.4 UIU/ML (ref 0.3–4.2)

## 2023-10-25 PROCEDURE — 84443 ASSAY THYROID STIM HORMONE: CPT | Performed by: FAMILY MEDICINE

## 2023-10-25 PROCEDURE — 99213 OFFICE O/P EST LOW 20 MIN: CPT | Performed by: FAMILY MEDICINE

## 2023-10-25 PROCEDURE — 84439 ASSAY OF FREE THYROXINE: CPT | Performed by: FAMILY MEDICINE

## 2023-10-25 PROCEDURE — 36415 COLL VENOUS BLD VENIPUNCTURE: CPT | Performed by: FAMILY MEDICINE

## 2023-10-25 RX ORDER — PROGESTERONE 100 MG/1
100 CAPSULE ORAL DAILY
Status: ON HOLD | COMMUNITY
Start: 2023-10-23 | End: 2024-06-25

## 2023-10-25 ASSESSMENT — PATIENT HEALTH QUESTIONNAIRE - PHQ9
SUM OF ALL RESPONSES TO PHQ QUESTIONS 1-9: 8
SUM OF ALL RESPONSES TO PHQ QUESTIONS 1-9: 8
10. IF YOU CHECKED OFF ANY PROBLEMS, HOW DIFFICULT HAVE THESE PROBLEMS MADE IT FOR YOU TO DO YOUR WORK, TAKE CARE OF THINGS AT HOME, OR GET ALONG WITH OTHER PEOPLE: SOMEWHAT DIFFICULT

## 2023-10-25 NOTE — PROGRESS NOTES
"  Assessment & Plan     Acquired hypothyroidism  We will recheck on the higher dose of Synthroid  - TSH with free T4 reflex; Future    Depression With Anxiety  Very pleased with her Zoloft would not change    Wellness form we completed a wellness form for work today.             BMI:   Estimated body mass index is 30.45 kg/m  as calculated from the following:    Height as of this encounter: 1.651 m (5' 5\").    Weight as of this encounter: 83 kg (183 lb).           Ryan Mauricio MD  [facility]    Jenifer Ramires is a 33 year old, presenting for the following health issues:  RECHECK (thyroid)      10/25/2023     4:16 PM   Additional Questions   Roomed by Ness       History of Present Illness       Reason for visit:  Thyroid and paperwork    She eats 2-3 servings of fruits and vegetables daily.She consumes 2 sweetened beverage(s) daily.She exercises with enough effort to increase her heart rate 20 to 29 minutes per day.  She exercises with enough effort to increase her heart rate 3 or less days per week.   She is taking medications regularly.                 Review of Systems   Constitutional, HEENT, cardiovascular, pulmonary, gi and gu systems are negative, except as otherwise noted.      Objective    /72 (BP Location: Right arm, Patient Position: Sitting, Cuff Size: Adult Large)   Pulse 69   Temp 98.5  F (36.9  C) (Tympanic)   Resp 16   Ht 1.651 m (5' 5\")   Wt 83 kg (183 lb)   LMP  (LMP Unknown)   SpO2 99%   BMI 30.45 kg/m    Body mass index is 30.45 kg/m .  Physical Exam   GENERAL: healthy, alert and no distress  EYES: Eyes grossly normal to inspection, PERRL and conjunctivae and sclerae normal  HENT: ear canals and TM's normal, nose and mouth without ulcers or lesions  NECK: no adenopathy, no asymmetry, masses, or scars and thyroid normal to palpation  RESP: lungs clear to auscultation - no rales, rhonchi or wheezes  CV: regular rate and rhythm, normal S1 S2, no " S3 or S4, no murmur, click or rub, no peripheral edema and peripheral pulses strong  ABDOMEN: soft, nontender, no hepatosplenomegaly, no masses and bowel sounds normal  MS: no gross musculoskeletal defects noted, no edema  SKIN: no suspicious lesions or rashes  NEURO: Normal strength and tone, mentation intact and speech normal  PSYCH: mentation appears normal, affect normal/bright

## 2023-10-26 ENCOUNTER — MYC MEDICAL ADVICE (OUTPATIENT)
Dept: FAMILY MEDICINE | Facility: CLINIC | Age: 33
End: 2023-10-26
Payer: COMMERCIAL

## 2023-10-26 DIAGNOSIS — E03.9 ACQUIRED HYPOTHYROIDISM: Primary | ICD-10-CM

## 2023-10-26 RX ORDER — LEVOTHYROXINE SODIUM 175 UG/1
175 TABLET ORAL DAILY
Qty: 90 TABLET | Refills: 1 | Status: SHIPPED | OUTPATIENT
Start: 2023-10-26 | End: 2024-06-12

## 2023-10-26 NOTE — TELEPHONE ENCOUNTER
Routing to provider for review. Pt seen in clinic yesterday 10/25. She saw her lab results and wants to increase Levothyroxine.

## 2023-10-26 NOTE — TELEPHONE ENCOUNTER
Dose was at 125mcg 8/7/23 by GTG. Was changed to 150mcg dose on 10/16/23 after MyChart message in regards to results. Pt picked up 150 mcg dose on 10/23. Elliott confirmed.

## 2023-10-27 DIAGNOSIS — E03.9 ACQUIRED HYPOTHYROIDISM: Primary | ICD-10-CM

## 2024-02-04 ENCOUNTER — OFFICE VISIT (OUTPATIENT)
Dept: URGENT CARE | Facility: URGENT CARE | Age: 34
End: 2024-02-04
Payer: COMMERCIAL

## 2024-02-04 VITALS
HEART RATE: 84 BPM | OXYGEN SATURATION: 98 % | BODY MASS INDEX: 29.45 KG/M2 | DIASTOLIC BLOOD PRESSURE: 71 MMHG | WEIGHT: 177 LBS | SYSTOLIC BLOOD PRESSURE: 116 MMHG | RESPIRATION RATE: 18 BRPM | TEMPERATURE: 97.6 F

## 2024-02-04 DIAGNOSIS — J40 BRONCHITIS: Primary | ICD-10-CM

## 2024-02-04 PROCEDURE — 99214 OFFICE O/P EST MOD 30 MIN: CPT | Performed by: PHYSICIAN ASSISTANT

## 2024-02-04 RX ORDER — AZITHROMYCIN 250 MG/1
TABLET, FILM COATED ORAL
Qty: 6 TABLET | Refills: 0 | Status: SHIPPED | OUTPATIENT
Start: 2024-02-04 | End: 2024-02-09

## 2024-02-04 RX ORDER — ALBUTEROL SULFATE 90 UG/1
2 AEROSOL, METERED RESPIRATORY (INHALATION) EVERY 4 HOURS PRN
Qty: 18 G | Refills: 3 | Status: SHIPPED | OUTPATIENT
Start: 2024-02-04 | End: 2024-08-23

## 2024-02-04 NOTE — PROGRESS NOTES
Assessment & Plan     Bronchitis  Given duration of sx of 2.5 weeks as well as not able to do CXR given pregnancy will cover with zithromax.     Albuterol for wheezing and cough.    Pt has needed medrol in the past for cough. At this time she is not laboring to breath, normal sats, has not tried albuterol, and pregnant, would not recommend oral steroid at this time but could be considered if not improving however if ongoing bronchospasm addition of ICS would be another safer option given pregnancy.      - albuterol (PROAIR HFA/PROVENTIL HFA/VENTOLIN HFA) 108 (90 Base) MCG/ACT inhaler  Dispense: 18 g; Refill: 3  - azithromycin (ZITHROMAX) 250 MG tablet  Dispense: 6 tablet; Refill: 0       Return for 2 weeks if not improved, sooner if worsening..    Monica Kumar PA-C  [facility] [place]    Jenifer Ramires is a 33 year old female who presents to clinic today for the following health issues:  Chief Complaint   Patient presents with    Cough     X 2.5 weeks, productive cough, started with cold everything has gone away but cough, cough drops, robitussin      HPI: pt is otherwise helathy female with hx of being 22 weeks pregnant, complicated by hypothyroidism.  Presents with 2.5 week hx of uri sx.  1 year old daughter has been intermittently ill with uri.    Sinus/head sx improved but cough persists    Wheezy at times.  Sob with coughing spells.  No CP.    Nausea/vomiting with pregnancy unchanged, mild.    No fevers.    Review of chart shows 12-21-24 treated with augmentin x 7 days for sinusitis, prior to that     Review of Systems  Constitutional, HEENT, cardiovascular, pulmonary, gi and gu systems are negative, except as otherwise noted.      Objective    /71   Pulse 84   Temp 97.6  F (36.4  C) (Tympanic)   Resp 18   Wt 80.3 kg (177 lb)   LMP  (LMP Unknown)   SpO2 98%   BMI 29.45 kg/m    Physical Exam   Pt is in no acute distress and appears well  Ears patent B:  TM s  intact, non-injected. All land marks easily visibile    Nasal mucosa is non-edematous, no discharge.    Pharynx: non erythematous, tonsils non hypertrophied, No exudate   Neck supple: no adenopathy  Lungs: few scattered expiratory wheezes.    Heart: RRR, no murmur, no thrills or heaves   Ext: no edema  Skin: no rashes    No results found for any visits on 02/04/24.

## 2024-02-26 DIAGNOSIS — F34.1 DYSTHYMIC DISORDER: ICD-10-CM

## 2024-05-08 ENCOUNTER — TRANSFERRED RECORDS (OUTPATIENT)
Dept: HEALTH INFORMATION MANAGEMENT | Facility: CLINIC | Age: 34
End: 2024-05-08
Payer: COMMERCIAL

## 2024-05-08 LAB — TSH SERPL-ACNC: 1.08 UIU/ML (ref 0.45–4.5)

## 2024-06-12 DIAGNOSIS — E03.9 ACQUIRED HYPOTHYROIDISM: ICD-10-CM

## 2024-06-12 RX ORDER — LEVOTHYROXINE SODIUM 175 UG/1
175 TABLET ORAL DAILY
Qty: 90 TABLET | Refills: 1 | Status: SHIPPED | OUTPATIENT
Start: 2024-06-12

## 2024-06-24 ENCOUNTER — ANESTHESIA EVENT (OUTPATIENT)
Dept: OBGYN | Facility: CLINIC | Age: 34
End: 2024-06-24
Payer: COMMERCIAL

## 2024-06-24 ENCOUNTER — ANESTHESIA (OUTPATIENT)
Dept: OBGYN | Facility: CLINIC | Age: 34
End: 2024-06-24
Payer: COMMERCIAL

## 2024-06-24 PROCEDURE — 250N000011 HC RX IP 250 OP 636: Mod: JZ | Performed by: ANESTHESIOLOGY

## 2024-06-24 PROCEDURE — 250N000009 HC RX 250: Performed by: ANESTHESIOLOGY

## 2024-06-24 PROCEDURE — 370N000003 HC ANESTHESIA WARD SERVICE: Performed by: ANESTHESIOLOGY

## 2024-06-24 RX ORDER — BUPIVACAINE HYDROCHLORIDE 2.5 MG/ML
INJECTION, SOLUTION EPIDURAL; INFILTRATION; INTRACAUDAL PRN
Status: DISCONTINUED | OUTPATIENT
Start: 2024-06-24 | End: 2024-06-24

## 2024-06-24 RX ORDER — LIDOCAINE HYDROCHLORIDE AND EPINEPHRINE 15; 5 MG/ML; UG/ML
INJECTION, SOLUTION EPIDURAL PRN
Status: DISCONTINUED | OUTPATIENT
Start: 2024-06-24 | End: 2024-06-24

## 2024-06-24 RX ADMIN — LIDOCAINE HYDROCHLORIDE,EPINEPHRINE BITARTRATE 3 ML: 15; .005 INJECTION, SOLUTION EPIDURAL; INFILTRATION; INTRACAUDAL; PERINEURAL at 09:03

## 2024-06-24 RX ADMIN — BUPIVACAINE HYDROCHLORIDE 5 ML: 2.5 INJECTION, SOLUTION EPIDURAL; INFILTRATION; INTRACAUDAL at 09:11

## 2024-06-24 NOTE — ANESTHESIA PREPROCEDURE EVALUATION
Anesthesia Pre-Procedure Evaluation    Patient: Keyla Carreno   MRN: [id number] : 1990        Procedure : * No procedures listed *  Induction       Past Medical History:   Diagnosis Date    Abnormal Pap smear of cervix     nl since    Acne     Allergic rhinitis     Anemia     Anxiety     ASCUS with positive high risk HPV cervical 2013,  NIL paps 13 ASCUS, +HPV (unknown risk factor) 10/22/14 NIL 17 NIL pap, neg HPV 21 NIL pap, neg HPV. Routine screening    Gastroesophageal reflux disease     Major depressive disorder, recurrent episode, unspecified     Unspecified hypothyroidism       Past Surgical History:   Procedure Laterality Date    EXCISE NOLASCO'S NEUROMA FOOT Right 10/18/2021    Procedure: EXCISION NEUROMA second intermetatarsal space right foot;  Surgeon: Roe Almeida DPM;  Location: [facility]    WISDOM TOOTH EXTRACTION        Allergies   Allergen Reactions    Cat Hair Std Allergenic Ext [Cat Hair Extract] Unknown    Grass Unknown    House Dust [Dust Mite Extract] Unknown    Mold Extracts [Molds & Smuts] Unknown    Other Environmental Allergy     Pollen [Pollen Extract] Unknown    Trees Unknown    Venom-Honey Bee [Bee Venom] Swelling      Social History     Tobacco Use    Smoking status: Never     Passive exposure: Never    Smokeless tobacco: Never   Substance Use Topics    Alcohol use: Not Currently     Alcohol/week: 3.0 standard drinks of alcohol     Types: 3 Standard drinks or equivalent per week      Wt Readings from Last 1 Encounters:   24 90.7 kg (200 lb)        Anesthesia Evaluation            ROS/MED HX  ENT/Pulmonary:  - neg pulmonary ROS     Neurologic:  - neg neurologic ROS     Cardiovascular:  - neg cardiovascular ROS     METS/Exercise Tolerance: >4 METS    Hematologic:  - neg hematologic  ROS     Musculoskeletal:  - neg musculoskeletal ROS     GI/Hepatic:       Renal/Genitourinary:  - neg Renal ROS     Endo:  - neg endo ROS    "  Psychiatric/Substance Use:  - neg psychiatric ROS     Infectious Disease:  - neg infectious disease ROS     Malignancy:  - neg malignancy ROS     Other:            Physical Exam    Airway  airway exam normal      Mallampati: II       Respiratory Devices and Support         Dental  no notable dental history         Cardiovascular   cardiovascular exam normal          Pulmonary   pulmonary exam normal                OUTSIDE LABS:  CBC:   Lab Results   Component Value Date    WBC 9.8 01/30/2023    WBC 4.9 01/29/2021    HGB 10.1 (L) 06/24/2024    HGB 10.2 (L) 01/30/2023    HCT 31.7 (L) 01/30/2023    HCT 42.9 01/29/2021     06/24/2024     01/30/2023     BMP:   Lab Results   Component Value Date     10/06/2021     01/29/2021    POTASSIUM 4.3 10/06/2021    POTASSIUM 3.9 01/29/2021    CHLORIDE 106 10/06/2021    CHLORIDE 107 01/29/2021    CO2 25 10/06/2021    CO2 27 01/29/2021    BUN 8 10/06/2021    BUN 8 01/29/2021    CR 0.83 10/06/2021    CR 0.80 01/29/2021    GLC 74 10/06/2021    GLC 76 01/29/2021     COAGS: No results found for: \"PTT\", \"INR\", \"FIBR\"  POC:   Lab Results   Component Value Date    HCG Negative 10/06/2021     HEPATIC:   Lab Results   Component Value Date    ALBUMIN 3.7 10/06/2021    PROTTOTAL 6.7 10/06/2021    ALT 30 10/06/2021    AST 21 10/06/2021    ALKPHOS 36 (L) 10/06/2021    BILITOTAL 0.5 10/06/2021     OTHER:   Lab Results   Component Value Date    ANSHUL 9.5 10/06/2021    TSH 20.40 (H) 10/25/2023    T4 0.73 (L) 10/25/2023       Anesthesia Plan    ASA Status:  2       Anesthesia Type: Epidural.              Consents    Anesthesia Plan(s) and associated risks, benefits, and realistic alternatives discussed. Questions answered and patient/representative(s) expressed understanding.     - Discussed:     - Discussed with:  Patient            Postoperative Care            Comments:               Padilla Motta MD    I have reviewed the pertinent notes and labs in the chart from " the past 30 days and (re)examined the patient.  Any updates or changes from those notes are reflected in this note.

## 2024-06-24 NOTE — ANESTHESIA PROCEDURE NOTES
"Epidural catheter Procedure Note    Pre-Procedure   Staff -        Anesthesiologist:  Padilla Motta MD       Performed By: anesthesiologist       Location: OB       Procedure Start/Stop Times: 6/24/2024 9:00 AM and 6/24/2024 9:16 AM       Pre-Anesthestic Checklist: patient identified, IV checked, risks and benefits discussed, informed consent, monitors and equipment checked, pre-op evaluation, at physician/surgeon's request and post-op pain management  Timeout:       Correct Patient: Yes        Correct Procedure: Yes        Correct Site: Yes        Correct Position: Yes   Procedure Documentation  Procedure: epidural catheter       Patient Position: sitting       Skin prep: Chloraprep       Local skin infiltrated with 5 mL of 1% lidocaine.        Insertion Site: L4-5. (midline approach).       Needle Type: Touhy needle       Needle Gauge: 18.        Needle Length (Inches): 3.5        Catheter: 19 G.          Catheter threaded easily.         4 cm epidural space.           # of attempts: 1 and  # of redirects:  0    Assessment/Narrative         Paresthesias: No.       Test dose of 3 mL at.         Test dose negative, 3 minutes after injection, for signs of intravascular, subdural, or intrathecal injection.       Aspiration negative for Heme or CSF via Epidural Catheter.    Medication(s) Administered   Medication Administration Time: 6/24/2024 9:00 AM      FOR [facility] ([facility] ONLY:   Pain Team Contact information: please page the Pain Team Via [employer]. Search \"Pain\". During daytime hours, please page the attending first. At night please page the resident first.      "

## 2024-07-27 ENCOUNTER — HEALTH MAINTENANCE LETTER (OUTPATIENT)
Age: 34
End: 2024-07-27

## 2024-08-20 ASSESSMENT — ANXIETY QUESTIONNAIRES
GAD7 TOTAL SCORE: 10
IF YOU CHECKED OFF ANY PROBLEMS ON THIS QUESTIONNAIRE, HOW DIFFICULT HAVE THESE PROBLEMS MADE IT FOR YOU TO DO YOUR WORK, TAKE CARE OF THINGS AT HOME, OR GET ALONG WITH OTHER PEOPLE: VERY DIFFICULT
2. NOT BEING ABLE TO STOP OR CONTROL WORRYING: SEVERAL DAYS
3. WORRYING TOO MUCH ABOUT DIFFERENT THINGS: SEVERAL DAYS
4. TROUBLE RELAXING: SEVERAL DAYS
7. FEELING AFRAID AS IF SOMETHING AWFUL MIGHT HAPPEN: NOT AT ALL
8. IF YOU CHECKED OFF ANY PROBLEMS, HOW DIFFICULT HAVE THESE MADE IT FOR YOU TO DO YOUR WORK, TAKE CARE OF THINGS AT HOME, OR GET ALONG WITH OTHER PEOPLE?: VERY DIFFICULT
6. BECOMING EASILY ANNOYED OR IRRITABLE: MORE THAN HALF THE DAYS
1. FEELING NERVOUS, ANXIOUS, OR ON EDGE: NEARLY EVERY DAY
7. FEELING AFRAID AS IF SOMETHING AWFUL MIGHT HAPPEN: NOT AT ALL
GAD7 TOTAL SCORE: 10
5. BEING SO RESTLESS THAT IT IS HARD TO SIT STILL: MORE THAN HALF THE DAYS

## 2024-08-23 ENCOUNTER — OFFICE VISIT (OUTPATIENT)
Dept: FAMILY MEDICINE | Facility: CLINIC | Age: 34
End: 2024-08-23
Payer: COMMERCIAL

## 2024-08-23 VITALS
HEIGHT: 65 IN | TEMPERATURE: 98.1 F | DIASTOLIC BLOOD PRESSURE: 64 MMHG | HEART RATE: 71 BPM | WEIGHT: 189 LBS | OXYGEN SATURATION: 97 % | BODY MASS INDEX: 31.49 KG/M2 | SYSTOLIC BLOOD PRESSURE: 100 MMHG | RESPIRATION RATE: 16 BRPM

## 2024-08-23 DIAGNOSIS — E03.9 ACQUIRED HYPOTHYROIDISM: ICD-10-CM

## 2024-08-23 DIAGNOSIS — F34.1 DYSTHYMIC DISORDER: Primary | ICD-10-CM

## 2024-08-23 DIAGNOSIS — D64.9 LOW HEMOGLOBIN: ICD-10-CM

## 2024-08-23 LAB
ANION GAP SERPL CALCULATED.3IONS-SCNC: 8 MMOL/L (ref 7–15)
BUN SERPL-MCNC: 9.4 MG/DL (ref 6–20)
CALCIUM SERPL-MCNC: 9.2 MG/DL (ref 8.8–10.4)
CHLORIDE SERPL-SCNC: 106 MMOL/L (ref 98–107)
CREAT SERPL-MCNC: 1.04 MG/DL (ref 0.51–0.95)
EGFRCR SERPLBLD CKD-EPI 2021: 72 ML/MIN/1.73M2
ERYTHROCYTE [DISTWIDTH] IN BLOOD BY AUTOMATED COUNT: 15.5 % (ref 10–15)
GLUCOSE SERPL-MCNC: 86 MG/DL (ref 70–99)
HCO3 SERPL-SCNC: 26 MMOL/L (ref 22–29)
HCT VFR BLD AUTO: 38.4 % (ref 35–47)
HGB BLD-MCNC: 11.7 G/DL (ref 11.7–15.7)
MCH RBC QN AUTO: 23.9 PG (ref 26.5–33)
MCHC RBC AUTO-ENTMCNC: 30.5 G/DL (ref 31.5–36.5)
MCV RBC AUTO: 79 FL (ref 78–100)
PLATELET # BLD AUTO: 201 10E3/UL (ref 150–450)
POTASSIUM SERPL-SCNC: 4.9 MMOL/L (ref 3.4–5.3)
RBC # BLD AUTO: 4.89 10E6/UL (ref 3.8–5.2)
SODIUM SERPL-SCNC: 140 MMOL/L (ref 135–145)
TSH SERPL DL<=0.005 MIU/L-ACNC: 2.19 UIU/ML (ref 0.3–4.2)
WBC # BLD AUTO: 4.9 10E3/UL (ref 4–11)

## 2024-08-23 PROCEDURE — 80048 BASIC METABOLIC PNL TOTAL CA: CPT

## 2024-08-23 PROCEDURE — 99214 OFFICE O/P EST MOD 30 MIN: CPT

## 2024-08-23 PROCEDURE — 84443 ASSAY THYROID STIM HORMONE: CPT

## 2024-08-23 PROCEDURE — 85027 COMPLETE CBC AUTOMATED: CPT

## 2024-08-23 PROCEDURE — 36415 COLL VENOUS BLD VENIPUNCTURE: CPT

## 2024-08-23 RX ORDER — PROPRANOLOL HYDROCHLORIDE 10 MG/1
10 TABLET ORAL 2 TIMES DAILY PRN
Qty: 30 TABLET | Refills: 1 | Status: SHIPPED | OUTPATIENT
Start: 2024-08-23

## 2024-08-23 ASSESSMENT — PATIENT HEALTH QUESTIONNAIRE - PHQ9
SUM OF ALL RESPONSES TO PHQ QUESTIONS 1-9: 14
10. IF YOU CHECKED OFF ANY PROBLEMS, HOW DIFFICULT HAVE THESE PROBLEMS MADE IT FOR YOU TO DO YOUR WORK, TAKE CARE OF THINGS AT HOME, OR GET ALONG WITH OTHER PEOPLE: VERY DIFFICULT
SUM OF ALL RESPONSES TO PHQ QUESTIONS 1-9: 14

## 2024-08-23 NOTE — PROGRESS NOTES
"  Assessment & Plan     Depression With Anxiety  Has been on sertraline long-term.  Recently gave birth and has an 8-week-old.  No longer breast-feeding.  Does have some breakthrough anxiety and discussed propranolol versus hydroxyzine.  Patient notes that anxiety is worse in the evenings when her  is gone working overnights.  She is concerned about anything that would sedate her.  Shared decision making to try propranolol.  Patient does that tend to run on the low side for blood pressure and she will monitor for side effects related to this medication.  - sertraline (ZOLOFT) 50 MG tablet; Take 1 tablet (50 mg) by mouth daily.  - propranolol (INDERAL) 10 MG tablet; Take 1 tablet (10 mg) by mouth 2 times daily as needed (for anxiety).  - Basic metabolic panel  (Ca, Cl, CO2, Creat, Gluc, K, Na, BUN); Future  - Basic metabolic panel  (Ca, Cl, CO2, Creat, Gluc, K, Na, BUN)    Acquired hypothyroidism  Previously being monitored by OB.  TSH was 1.08 on 5/9/2024.  Will recheck today and adjust dose as needed.  Patient denies any symptoms of hypo or hyperthyroid  - TSH; Future  - TSH    Low hemoglobin  Had low hemoglobin after giving birth, reports that she has had some problems with anemia in the past.  Unsure of cause.  Will recheck today.  - CBC with platelets; Future  - CBC with platelets      BMI  Estimated body mass index is 31.45 kg/m  as calculated from the following:    Height as of this encounter: 1.651 m (5' 5\").    Weight as of this encounter: 85.7 kg (189 lb).             Jenifer Ramires is a 34 year old, presenting for the following health issues:  Recheck Medication (Medication follow up )        8/23/2024     8:11 AM   Additional Questions   Roomed by JAQUELINE Reyez     Had a baby approx 8 weeks.     TSH was done by OB 5/9/24 and was 1.08    History of Present Illness       Mental Health Follow-up:  Patient presents to follow-up on Depression & Anxiety.Patient's depression since last visit has been:  " "Medium  The patient is not having other symptoms associated with depression.  Patient's anxiety since last visit has been:  Worse  The patient is having other symptoms associated with anxiety.  Any significant life events: other  Patient is feeling anxious or having panic attacks.  Patient has no concerns about alcohol or drug use.    Hypothyroidism:     Since last visit, patient describes the following symptoms::  Anxiety, Depression, Dry skin, Fatigue and Hair loss    She eats 2-3 servings of fruits and vegetables daily.She consumes 0 sweetened beverage(s) daily.She exercises with enough effort to increase her heart rate 20 to 29 minutes per day.  She exercises with enough effort to increase her heart rate 3 or less days per week. She is missing 2 dose(s) of medications per week.  She is not taking prescribed medications regularly due to remembering to take.                     Objective    /64 (BP Location: Right arm, Patient Position: Sitting, Cuff Size: Adult Large)   Pulse 71   Temp 98.1  F (36.7  C) (Oral)   Resp 16   Ht 1.651 m (5' 5\")   Wt 85.7 kg (189 lb)   LMP  (LMP Unknown)   SpO2 97%   BMI 31.45 kg/m    Body mass index is 31.45 kg/m .  Physical Exam  HENT:      Head: Normocephalic.      Mouth/Throat:      Mouth: Mucous membranes are moist.      Pharynx: Oropharynx is clear.   Eyes:      Extraocular Movements: Extraocular movements intact.      Conjunctiva/sclera: Conjunctivae normal.   Cardiovascular:      Rate and Rhythm: Normal rate.   Pulmonary:      Effort: Pulmonary effort is normal.   Skin:     General: Skin is warm and dry.      Capillary Refill: Capillary refill takes less than 2 seconds.   Neurological:      Mental Status: She is alert and oriented to person, place, and time.   Psychiatric:         Behavior: Behavior normal.         Thought Content: Thought content normal.                    Signed Electronically by: SYED Chinchilla CNP    "

## 2024-10-02 ENCOUNTER — OFFICE VISIT (OUTPATIENT)
Dept: FAMILY MEDICINE | Facility: CLINIC | Age: 34
End: 2024-10-02
Payer: COMMERCIAL

## 2024-10-02 VITALS
RESPIRATION RATE: 16 BRPM | TEMPERATURE: 97.9 F | BODY MASS INDEX: 31.81 KG/M2 | SYSTOLIC BLOOD PRESSURE: 110 MMHG | HEIGHT: 65 IN | HEART RATE: 72 BPM | DIASTOLIC BLOOD PRESSURE: 69 MMHG | WEIGHT: 190.9 LBS | OXYGEN SATURATION: 98 %

## 2024-10-02 DIAGNOSIS — Z01.818 PREOP GENERAL PHYSICAL EXAM: Primary | ICD-10-CM

## 2024-10-02 DIAGNOSIS — N39.3 FEMALE STRESS INCONTINENCE: ICD-10-CM

## 2024-10-02 DIAGNOSIS — E03.9 ACQUIRED HYPOTHYROIDISM: ICD-10-CM

## 2024-10-02 LAB
ANION GAP SERPL CALCULATED.3IONS-SCNC: 8 MMOL/L (ref 7–15)
BUN SERPL-MCNC: 10.2 MG/DL (ref 6–20)
CALCIUM SERPL-MCNC: 9.1 MG/DL (ref 8.8–10.4)
CHLORIDE SERPL-SCNC: 107 MMOL/L (ref 98–107)
CREAT SERPL-MCNC: 0.93 MG/DL (ref 0.51–0.95)
EGFRCR SERPLBLD CKD-EPI 2021: 82 ML/MIN/1.73M2
ERYTHROCYTE [DISTWIDTH] IN BLOOD BY AUTOMATED COUNT: 15.4 % (ref 10–15)
GLUCOSE SERPL-MCNC: 68 MG/DL (ref 70–99)
HCG UR QL: NEGATIVE
HCO3 SERPL-SCNC: 26 MMOL/L (ref 22–29)
HCT VFR BLD AUTO: 38.4 % (ref 35–47)
HGB BLD-MCNC: 12.2 G/DL (ref 11.7–15.7)
MCH RBC QN AUTO: 24.6 PG (ref 26.5–33)
MCHC RBC AUTO-ENTMCNC: 31.8 G/DL (ref 31.5–36.5)
MCV RBC AUTO: 77 FL (ref 78–100)
PLATELET # BLD AUTO: 229 10E3/UL (ref 150–450)
POTASSIUM SERPL-SCNC: 4.4 MMOL/L (ref 3.4–5.3)
RBC # BLD AUTO: 4.96 10E6/UL (ref 3.8–5.2)
SODIUM SERPL-SCNC: 141 MMOL/L (ref 135–145)
T4 FREE SERPL-MCNC: 1.15 NG/DL (ref 0.9–1.7)
TSH SERPL DL<=0.005 MIU/L-ACNC: 0.14 UIU/ML (ref 0.3–4.2)
WBC # BLD AUTO: 4.9 10E3/UL (ref 4–11)

## 2024-10-02 PROCEDURE — 84439 ASSAY OF FREE THYROXINE: CPT

## 2024-10-02 PROCEDURE — 81025 URINE PREGNANCY TEST: CPT

## 2024-10-02 PROCEDURE — 85027 COMPLETE CBC AUTOMATED: CPT

## 2024-10-02 PROCEDURE — 36415 COLL VENOUS BLD VENIPUNCTURE: CPT

## 2024-10-02 PROCEDURE — 80048 BASIC METABOLIC PNL TOTAL CA: CPT

## 2024-10-02 PROCEDURE — 99214 OFFICE O/P EST MOD 30 MIN: CPT

## 2024-10-02 PROCEDURE — 84443 ASSAY THYROID STIM HORMONE: CPT

## 2024-10-02 RX ORDER — EPINEPHRINE 0.3 MG/.3ML
0.3 INJECTION SUBCUTANEOUS PRN
COMMUNITY

## 2024-10-02 ASSESSMENT — PATIENT HEALTH QUESTIONNAIRE - PHQ9
SUM OF ALL RESPONSES TO PHQ QUESTIONS 1-9: 11
10. IF YOU CHECKED OFF ANY PROBLEMS, HOW DIFFICULT HAVE THESE PROBLEMS MADE IT FOR YOU TO DO YOUR WORK, TAKE CARE OF THINGS AT HOME, OR GET ALONG WITH OTHER PEOPLE: SOMEWHAT DIFFICULT
SUM OF ALL RESPONSES TO PHQ QUESTIONS 1-9: 11

## 2024-10-02 NOTE — PROGRESS NOTES
Preoperative Evaluation  [address] [address]  Phone: [phone]  Fax: [phone]  Primary Provider: Gene Vásquez MD  Pre-op Performing Provider: SYED Chinhcilla CNP  Oct 2, 2024             10/2/2024   Surgical Information   What procedure is being done? retro pubic midurethral sling   Facility or Hospital where procedure/surgery will be performed: [facility]   Who is doing the procedure / surgery? boeruth ann   Date of surgery / procedure: 10/8   Time of surgery / procedure: 9am   Where do you plan to recover after surgery? at home with family        Fax number for surgical facility: [facility] [phone] and [facility] [phone].    Assessment & Plan     The proposed surgical procedure is considered INTERMEDIATE risk.    Preop general physical exam  No history of problems with anesthesia with past surgery.  Mallampati 1.  Not taking any controlled substances, patient to take daily medications with sip of water on day of surgery.  Urine pregnancy test today negative  - Basic metabolic panel  (Ca, Cl, CO2, Creat, Gluc, K, Na, BUN); Future  - CBC with platelets; Future  - HCG qualitative urine; Future  - Basic metabolic panel  (Ca, Cl, CO2, Creat, Gluc, K, Na, BUN)  - CBC with platelets  - HCG qualitative urine    Female stress incontinence  Patient has been experiencing leakage of urine since birth of child approximately 3 months ago.  Patient is no longer breast-feeding    Acquired hypothyroidism  Patient on 175 mcg of levothyroxine, will recheck TSH today and adjust dosage as needed.  - TSH with free T4 reflex       - No identified additional risk factors other than previously addressed    Antiplatelet or Anticoagulation Medication Instructions   - Patient is on no antiplatelet or anticoagulation medications.    Additional Medication Instructions  Take all scheduled medications on the day of  surgery    Recommendation  Approval given to proceed with proposed procedure, without further diagnostic evaluation.  Patient has not had previous problems with anesthesia  Subjective   Keyla is a 34 year old, presenting for the following:  Pre-Op Exam          10/2/2024     8:44 AM   Additional Questions   Roomed by Natalie DUQUE   Accompanied by Self     HPI related to upcoming procedure: Has been experiencing some leaking urine after childbirth. Patient is 3 months postpartum and is not breastfeeding.        10/2/2024   Pre-Op Questionnaire   Have you ever had a heart attack or stroke? No   Have you ever had surgery on your heart or blood vessels, such as a stent placement, a coronary artery bypass, or surgery on an artery in your head, neck, heart, or legs? No   Do you have chest pain with activity? No   Do you have a history of heart failure? No   Do you currently have a cold, bronchitis or symptoms of other infection? No   Do you have a cough, shortness of breath, or wheezing? No   Do you or anyone in your family have previous history of blood clots? No   Do you or does anyone in your family have a serious bleeding problem such as prolonged bleeding following surgeries or cuts? No   Have you ever had problems with anemia or been told to take iron pills? (!) YES during pregnancy, patient is 3 months postpartum   Have you had any abnormal blood loss such as black, tarry or bloody stools, or abnormal vaginal bleeding? No   Have you ever had a blood transfusion? No   Are you willing to have a blood transfusion if it is medically needed before, during, or after your surgery? Yes   Have you or any of your relatives ever had problems with anesthesia? No   Do you have sleep apnea, excessive snoring or daytime drowsiness? No   Do you have any artifical heart valves or other implanted medical devices like a pacemaker, defibrillator, or continuous glucose monitor? No   Do you have artificial joints? No   Are you allergic to  latex? No        Health Care Directive  Patient does not have a Health Care Directive or Living Will: Discussed advance care planning with patient; however, patient declined at this time.    Preoperative Review of    reviewed - no record of controlled substances prescribed.          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     Priority: Medium     Added automatically from request for surgery [id number]      Gastroesophageal reflux disease without esophagitis 01/28/2021     Priority: Medium    Acne 01/16/2017     Priority: Medium    Common Migraine (Without Aura)      Priority: Medium     Created by [employer] [facility] Annotation: Apr 17 2013  3:38PM - Nadja Small: One every 2   or 3   months  Replacement Utility updated for latest IMO load        Allergic Rhinitis      Priority: Medium     Created by [facility] Annotation: Sep 17 2012 11:40AM - Nadja Small: Allergy   shots   from Dr. Gamino  Replacement Utility updated for latest IMO load        Hypothyroidism      Priority: Medium     Created by [employer]  Replacement Utility updated for latest IMO load        Anemia 01/05/2015     Priority: Medium    Insomnia      Priority: Medium     Created by [facility] Annotation: Apr 17 2013  3:40PM - Alisha Landrum: Falls   asleep   easily, wakes frequently.        Depression With Anxiety      Priority: Medium     Created by Conversion        ASCUS with positive high risk HPV cervical 04/17/2013     Priority: Medium     2010, 2011 NIL paps  4/17/13 ASCUS, +HPV (unknown risk factor)  10/22/14 NIL  4/28/17 NIL pap, neg HPV  1/29/21 NIL pap, neg HPV. Routine screening          Past Medical History:   Diagnosis Date    Abnormal Pap smear of cervix 2014    nl since    Acne     Allergic rhinitis     Anemia     Anxiety     ASCUS with positive high risk HPV cervical 4/17/2013 2010, 2011 NIL paps  4/17/13 ASCUS, +HPV (unknown risk factor) 10/22/14 NIL 4/28/17 NIL pap, neg HPV 1/29/21 NIL pap, neg HPV. Routine screening    Gastroesophageal reflux disease     Major depressive disorder, recurrent episode, unspecified     Unspecified hypothyroidism      Past Surgical History:   Procedure Laterality Date    EXCISE CONSTANCE'S NEUROMA FOOT Right 10/18/2021    Procedure: EXCISION NEUROMA second intermetatarsal space right foot;  Surgeon: Roe Almeida DPM;  Location: [facility]    WISDOM TOOTH EXTRACTION  2011     Current Outpatient Medications   Medication Sig Dispense Refill    cetirizine (ZYRTEC) 10 MG tablet [CETIRIZINE (ZYRTEC) 10 MG TABLET] Take 10 mg by mouth daily.      EPINEPHrine (ANY BX GENERIC EQUIV) 0.3 MG/0.3ML injection 2-pack Inject 0.3 mg into the muscle as needed for anaphylaxis. May repeat one time in 5-15 minutes if response to initial dose is inadequate.      levothyroxine (SYNTHROID/LEVOTHROID) 175 MCG tablet TAKE 1 TABLET(175 MCG) BY MOUTH DAILY 90 tablet 1    omeprazole (PRILOSEC) 20 MG DR capsule Take 20 mg by mouth as needed.      propranolol (INDERAL) 10 MG tablet Take 1 tablet (10 mg) by mouth 2 times daily as needed (for anxiety). 30 tablet 1    sertraline (ZOLOFT) 50 MG tablet Take 1 tablet (50 mg) by mouth daily. 90 tablet 3    Prenatal Vit-Fe Fumarate-FA (PRENATAL MULTIVITAMIN W/IRON) 27-0.8 MG tablet Take 1 tablet by mouth daily.         Allergies   Allergen Reactions    Cat Hair Std Allergenic Ext [Cat Hair Extract] Unknown    Grass Unknown    House Dust [Dust Mite Extract] Unknown    Mold Extracts [Molds & Smuts] Unknown    Other Environmental Allergy     Pollen [Pollen Extract] Unknown    Trees Unknown    Venom-Honey Bee [Bee Venom] Swelling        Social History     Tobacco Use    Smoking status: Never     Passive exposure: Never    Smokeless tobacco: Never   Substance Use Topics    Alcohol use: Not Currently     Alcohol/week: 3.0 standard drinks of alcohol     Types: 3  "Standard drinks or equivalent per week       History   Drug Use No             Review of Systems  CONSTITUTIONAL: NEGATIVE for fever, chills, change in weight  INTEGUMENTARY/SKIN: NEGATIVE for worrisome rashes, moles or lesions  EYES: NEGATIVE for vision changes or irritation  ENT/MOUTH: NEGATIVE for ear, mouth and throat problems  RESP: NEGATIVE for significant cough or SOB  BREAST: NEGATIVE for masses, tenderness or discharge  CV: NEGATIVE for chest pain, palpitations or peripheral edema  GI: NEGATIVE for nausea, abdominal pain, heartburn, or change in bowel habits  : NEGATIVE for frequency, dysuria, or hematuria  MUSCULOSKELETAL: NEGATIVE for significant arthralgias or myalgia  NEURO: NEGATIVE for weakness, dizziness or paresthesias  ENDOCRINE: NEGATIVE for temperature intolerance, skin/hair changes  HEME: NEGATIVE for bleeding problems  PSYCHIATRIC: NEGATIVE for changes in mood or affect    Objective    LMP 09/26/2024 (Exact Date)   Breastfeeding No    Estimated body mass index is 31.45 kg/m  as calculated from the following:    Height as of 8/23/24: 1.651 m (5' 5\").    Weight as of 8/23/24: 85.7 kg (189 lb).  Physical Exam  GENERAL: alert and no distress  EYES: Eyes grossly normal to inspection, PERRL and conjunctivae and sclerae normal  HENT: ear canals and TM's normal, nose and mouth without ulcers or lesions  NECK: no adenopathy, no asymmetry, masses, or scars  RESP: lungs clear to auscultation - no rales, rhonchi or wheezes  CV: regular rate and rhythm, normal S1 S2, no S3 or S4, no murmur, click or rub, no peripheral edema  ABDOMEN: soft, nontender, no hepatosplenomegaly, no masses and bowel sounds normal  MS: no gross musculoskeletal defects noted, no edema  SKIN: no suspicious lesions or rashes  NEURO: Normal strength and tone, mentation intact and speech normal  PSYCH: mentation appears normal, affect normal/bright    Recent Labs   Lab Test 08/23/24  0842 06/24/24  0855   HGB 11.7 10.1*    185 "     --    POTASSIUM 4.9  --    CR 1.04*  --         Diagnostics  Recent Results (from the past 240 hour(s))   TSH with free T4 reflex    Collection Time: 10/02/24  9:33 AM   Result Value Ref Range    TSH 0.14 (L) 0.30 - 4.20 uIU/mL   Basic metabolic panel  (Ca, Cl, CO2, Creat, Gluc, K, Na, BUN)    Collection Time: 10/02/24  9:33 AM   Result Value Ref Range    Sodium 141 135 - 145 mmol/L    Potassium 4.4 3.4 - 5.3 mmol/L    Chloride 107 98 - 107 mmol/L    Carbon Dioxide (CO2) 26 22 - 29 mmol/L    Anion Gap 8 7 - 15 mmol/L    Urea Nitrogen 10.2 6.0 - 20.0 mg/dL    Creatinine 0.93 0.51 - 0.95 mg/dL    GFR Estimate 82 >60 mL/min/1.73m2    Calcium 9.1 8.8 - 10.4 mg/dL    Glucose 68 (L) 70 - 99 mg/dL   CBC with platelets    Collection Time: 10/02/24  9:33 AM   Result Value Ref Range    WBC Count 4.9 4.0 - 11.0 10e3/uL    RBC Count 4.96 3.80 - 5.20 10e6/uL    Hemoglobin 12.2 11.7 - 15.7 g/dL    Hematocrit 38.4 35.0 - 47.0 %    MCV 77 (L) 78 - 100 fL    MCH 24.6 (L) 26.5 - 33.0 pg    MCHC 31.8 31.5 - 36.5 g/dL    RDW 15.4 (H) 10.0 - 15.0 %    Platelet Count 229 150 - 450 10e3/uL   T4 free    Collection Time: 10/02/24  9:33 AM   Result Value Ref Range    Free T4 1.15 0.90 - 1.70 ng/dL   HCG qualitative urine    Collection Time: 10/02/24 10:00 AM   Result Value Ref Range    hCG Urine Qualitative Negative Negative       No EKG required, no history of coronary heart disease, significant arrhythmia, peripheral arterial disease or other structural heart disease.    Revised Cardiac Risk Index (RCRI)  The patient has the following serious cardiovascular risks for perioperative complications:   - No serious cardiac risks = 0 points     RCRI Interpretation: 0 points: Class I (very low risk - 0.4% complication rate)         Signed Electronically by: SYED Chinchilla CNP  A copy of this evaluation report is provided to the requesting physician.

## 2024-10-02 NOTE — LETTER
October 3, 2024      Keyla Carreno  [address]  [place] [address]        Dear ,    We are writing to inform you of your test results.    Please make an appointment with your provider to review or follow up on your test results.  Appointments can be made by calling .    Resulted Orders   TSH with free T4 reflex   Result Value Ref Range    TSH 0.14 (L) 0.30 - 4.20 uIU/mL   T4 free   Result Value Ref Range    Free T4 1.15 0.90 - 1.70 ng/dL       If you have any questions or concerns, please call the clinic at the number listed above.       Sincerely,      Ryan Mauricio MD

## 2024-10-06 ENCOUNTER — OFFICE VISIT (OUTPATIENT)
Dept: URGENT CARE | Facility: URGENT CARE | Age: 34
End: 2024-10-06
Payer: COMMERCIAL

## 2024-10-06 VITALS
TEMPERATURE: 98 F | WEIGHT: 189.5 LBS | HEART RATE: 69 BPM | DIASTOLIC BLOOD PRESSURE: 68 MMHG | OXYGEN SATURATION: 97 % | BODY MASS INDEX: 31.53 KG/M2 | RESPIRATION RATE: 16 BRPM | SYSTOLIC BLOOD PRESSURE: 109 MMHG

## 2024-10-06 DIAGNOSIS — M54.2 NECK PAIN: Primary | ICD-10-CM

## 2024-10-06 PROCEDURE — 99213 OFFICE O/P EST LOW 20 MIN: CPT | Performed by: PHYSICIAN ASSISTANT

## 2024-10-06 RX ORDER — METHOCARBAMOL 500 MG/1
1000 TABLET, FILM COATED ORAL 3 TIMES DAILY
Qty: 40 TABLET | Refills: 1 | Status: SHIPPED | OUTPATIENT
Start: 2024-10-06 | End: 2024-11-06

## 2024-10-06 NOTE — PATIENT INSTRUCTIONS
Ibuprofen 800 mg every 8 hours.      Ice, heat, gentle range of motion.      You have been referred to Physical Therapy. You can contact the physical therapy office directly to schedule your appointment at the number below.    [facility] ([facility]) - [phone]    If you have any questions or you need further assistance, please contact the clinic at [phone].

## 2024-10-06 NOTE — LETTER
October 6, 2024      Keyla Carreno  [address]  [place] [address]        To Whom It May Concern:    Keyla Carreno  was seen today due to medical concern and not able to attend work 10-7-24.          Sincerely,        Monica Kumar PA-C     [facility]URBT
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
## 2024-10-11 ENCOUNTER — MEDICAL CORRESPONDENCE (OUTPATIENT)
Dept: HEALTH INFORMATION MANAGEMENT | Facility: CLINIC | Age: 34
End: 2024-10-11
Payer: COMMERCIAL

## 2024-11-05 ASSESSMENT — ANXIETY QUESTIONNAIRES
3. WORRYING TOO MUCH ABOUT DIFFERENT THINGS: MORE THAN HALF THE DAYS
1. FEELING NERVOUS, ANXIOUS, OR ON EDGE: SEVERAL DAYS
5. BEING SO RESTLESS THAT IT IS HARD TO SIT STILL: SEVERAL DAYS
6. BECOMING EASILY ANNOYED OR IRRITABLE: MORE THAN HALF THE DAYS
8. IF YOU CHECKED OFF ANY PROBLEMS, HOW DIFFICULT HAVE THESE MADE IT FOR YOU TO DO YOUR WORK, TAKE CARE OF THINGS AT HOME, OR GET ALONG WITH OTHER PEOPLE?: SOMEWHAT DIFFICULT
GAD7 TOTAL SCORE: 10
7. FEELING AFRAID AS IF SOMETHING AWFUL MIGHT HAPPEN: SEVERAL DAYS
GAD7 TOTAL SCORE: 10
4. TROUBLE RELAXING: SEVERAL DAYS
IF YOU CHECKED OFF ANY PROBLEMS ON THIS QUESTIONNAIRE, HOW DIFFICULT HAVE THESE PROBLEMS MADE IT FOR YOU TO DO YOUR WORK, TAKE CARE OF THINGS AT HOME, OR GET ALONG WITH OTHER PEOPLE: SOMEWHAT DIFFICULT
7. FEELING AFRAID AS IF SOMETHING AWFUL MIGHT HAPPEN: SEVERAL DAYS
2. NOT BEING ABLE TO STOP OR CONTROL WORRYING: MORE THAN HALF THE DAYS
GAD7 TOTAL SCORE: 10

## 2024-11-05 ASSESSMENT — PATIENT HEALTH QUESTIONNAIRE - PHQ9
SUM OF ALL RESPONSES TO PHQ QUESTIONS 1-9: 13
SUM OF ALL RESPONSES TO PHQ QUESTIONS 1-9: 13
10. IF YOU CHECKED OFF ANY PROBLEMS, HOW DIFFICULT HAVE THESE PROBLEMS MADE IT FOR YOU TO DO YOUR WORK, TAKE CARE OF THINGS AT HOME, OR GET ALONG WITH OTHER PEOPLE: SOMEWHAT DIFFICULT

## 2024-11-06 ENCOUNTER — OFFICE VISIT (OUTPATIENT)
Dept: FAMILY MEDICINE | Facility: CLINIC | Age: 34
End: 2024-11-06
Payer: COMMERCIAL

## 2024-11-06 VITALS
RESPIRATION RATE: 16 BRPM | HEIGHT: 65 IN | DIASTOLIC BLOOD PRESSURE: 72 MMHG | SYSTOLIC BLOOD PRESSURE: 116 MMHG | OXYGEN SATURATION: 97 % | WEIGHT: 192 LBS | TEMPERATURE: 98.2 F | BODY MASS INDEX: 31.99 KG/M2 | HEART RATE: 80 BPM

## 2024-11-06 DIAGNOSIS — E03.9 ACQUIRED HYPOTHYROIDISM: ICD-10-CM

## 2024-11-06 LAB
T4 FREE SERPL-MCNC: 1.31 NG/DL (ref 0.9–1.7)
TSH SERPL DL<=0.005 MIU/L-ACNC: 0.1 UIU/ML (ref 0.3–4.2)

## 2024-11-06 PROCEDURE — 36415 COLL VENOUS BLD VENIPUNCTURE: CPT

## 2024-11-06 PROCEDURE — 84443 ASSAY THYROID STIM HORMONE: CPT

## 2024-11-06 PROCEDURE — 84439 ASSAY OF FREE THYROXINE: CPT

## 2024-11-06 PROCEDURE — 99214 OFFICE O/P EST MOD 30 MIN: CPT

## 2024-11-06 RX ORDER — SERTRALINE HYDROCHLORIDE 100 MG/1
100 TABLET, FILM COATED ORAL DAILY
Qty: 30 TABLET | Refills: 2 | Status: SHIPPED | OUTPATIENT
Start: 2024-11-06

## 2024-11-06 ASSESSMENT — ENCOUNTER SYMPTOMS: NERVOUS/ANXIOUS: 1

## 2024-11-06 NOTE — PROGRESS NOTES
"  Assessment & Plan      depression  Patient experiencing postpartum depression.  Her child is approximately 4 months old.  Malinda Martínez scale was 9 few months ago, score of 20 today.  Patient denies thoughts of self-harm or thoughts of harm to baby.  She is tearful in clinic today.  We discussed increasing her long-term dose of sertraline from 50 mg to 100 mg.  She also has propranolol for breakthrough anxiety.  We discussed side effects associated with increase of this medication.  Patient is also interested in therapy and mental health referral is placed.  Patient would prefer telehealth with a female provider.  Patient to follow-up in 60 days or sooner if needed.  - sertraline (ZOLOFT) 100 MG tablet; Take 1 tablet (100 mg) by mouth daily.  - Adult Mental Health  Referral; Future    Acquired hypothyroidism  Hypothyroidism that has been difficult to treat with medication outside of pregnancy.  Patient takes levothyroxine currently, she has had luck with [place] thyroid extract in the past per her report.  Most recent TSH was 2024 and was low at 0.14 suggesting over replacement.  Patient does not remember when dose was increased.  Will recheck today and adjust dose as needed.  If difficulty achieving comfortable placement will reconsider [place] thyroid extract.  - TSH with free T4 reflex; Future  - TSH with free T4 reflex      BMI  Estimated body mass index is 31.95 kg/m  as calculated from the following:    Height as of this encounter: 1.651 m (5' 5\").    Weight as of this encounter: 87.1 kg (192 lb).             Subjective   Keyla is a 34 year old, presenting for the following health issues:  Anxiety (Discuss increasing dosage of Sertraline. )      2024     7:58 AM   Additional Questions   Roomed by wilton   Accompanied by n/a     Was previously experiencing anxiety, symptoms of depression    Baby is 4 months old, this is her second baby. Did not experience this level of depression " with first child.     History of Present Illness       Mental Health Follow-up:  Patient presents to follow-up on Depression & Anxiety.Patient's depression since last visit has been:  Worse  The patient is having other symptoms associated with depression.  Patient's anxiety since last visit has been:  Worse  The patient is having other symptoms associated with anxiety.  Any significant life events: other  Patient is feeling anxious or having panic attacks.  Patient has no concerns about alcohol or drug use.    She eats 4 or more servings of fruits and vegetables daily.She consumes 2 sweetened beverage(s) daily.She exercises with enough effort to increase her heart rate 20 to 29 minutes per day.  She exercises with enough effort to increase her heart rate 7 days per week. She is missing 1 dose(s) of medications per week.  She is not taking prescribed medications regularly due to remembering to take.         8/7/2023     7:47 AM 8/20/2024    10:09 AM 11/5/2024     8:18 PM   CORNELIA-7 SCORE   Total Score 12 (moderate anxiety) 10 (moderate anxiety) 10 (moderate anxiety)   Total Score 12 10 10        Patient-reported      PATIENT HEALTH QUESTIONNAIRE-9 (PHQ - 9)    Over the last 2 weeks, how often have you been bothered by any of the following problems?    1. Little interest or pleasure in doing things -  (Patient-Rptd) Several days   2. Feeling down, depressed, or hopeless -  (Patient-Rptd) Several days   3. Trouble falling or staying asleep, or sleeping too much - (Patient-Rptd) Nearly every day   4. Feeling tired or having little energy -  (Patient-Rptd) Nearly every day   5. Poor appetite or overeating -  (Patient-Rptd) More than half the days   6. Feeling bad about yourself - or that you are a failure or have let yourself or your family down -  (Patient-Rptd) Several days   7. Trouble concentrating on things, such as reading the newspaper or watching television - (Patient-Rptd) Several days   8. Moving or speaking so  "slowly that other people could have noticed? Or the opposite - being so fidgety or restless that you have been moving around a lot more than usual (Patient-Rptd) Several days   9. Thoughts that you would be better off dead or of hurting  yourself in some way (Patient-Rptd) Not at all   Total Score: (Patient-Rptd) 13     If you checked off any problems, how difficult have these problems made it for you to do your work, take care of things at home, or get along with other people?      Developed by Dora Parker, Jeane Razo, Rolando Guerrero and colleagues, with an educational demetrius from [employer] No permission required to reproduce, translate, display or distribute. permission required to reproduce, translate, display or distribute.                 Objective    /72 (BP Location: Right arm, Patient Position: Sitting)   Pulse 80   Temp 98.2  F (36.8  C) (Tympanic)   Resp 16   Ht 1.651 m (5' 5\")   Wt 87.1 kg (192 lb)   LMP 10/22/2024 (Exact Date)   SpO2 97%   BMI 31.95 kg/m    Body mass index is 31.95 kg/m .  Physical Exam     Physical Exam  Constitutional:       Appearance: Normal appearance.   HENT:      Head: Normocephalic.      Mouth/Throat:      Mouth: Mucous membranes are moist.   Eyes:      Extraocular Movements: Extraocular movements intact.      Conjunctiva/sclera: Conjunctivae normal.   Cardiovascular:      Rate and Rhythm: Normal rate.   Pulmonary:      Effort: Pulmonary effort is normal. No respiratory distress.   Skin:     General: Skin is warm and dry.      Capillary Refill: Capillary refill takes less than 2 seconds.   Neurological:      Mental Status: She is alert and oriented to person, place, and time.   Psychiatric:         Behavior: Behavior normal.         Thought Content: Thought content normal.               Signed Electronically by: SYED Chinchilla CNP    "

## 2024-11-09 DIAGNOSIS — E03.9 ACQUIRED HYPOTHYROIDISM: Primary | ICD-10-CM

## 2024-11-09 RX ORDER — LEVOTHYROXINE SODIUM 150 UG/1
150 TABLET ORAL DAILY
Qty: 60 TABLET | Refills: 0 | Status: SHIPPED | OUTPATIENT
Start: 2024-11-09

## 2024-12-30 ASSESSMENT — ANXIETY QUESTIONNAIRES
GAD7 TOTAL SCORE: 10
4. TROUBLE RELAXING: MORE THAN HALF THE DAYS
5. BEING SO RESTLESS THAT IT IS HARD TO SIT STILL: SEVERAL DAYS
3. WORRYING TOO MUCH ABOUT DIFFERENT THINGS: SEVERAL DAYS
7. FEELING AFRAID AS IF SOMETHING AWFUL MIGHT HAPPEN: SEVERAL DAYS
IF YOU CHECKED OFF ANY PROBLEMS ON THIS QUESTIONNAIRE, HOW DIFFICULT HAVE THESE PROBLEMS MADE IT FOR YOU TO DO YOUR WORK, TAKE CARE OF THINGS AT HOME, OR GET ALONG WITH OTHER PEOPLE: SOMEWHAT DIFFICULT
1. FEELING NERVOUS, ANXIOUS, OR ON EDGE: MORE THAN HALF THE DAYS
6. BECOMING EASILY ANNOYED OR IRRITABLE: SEVERAL DAYS
GAD7 TOTAL SCORE: 10
7. FEELING AFRAID AS IF SOMETHING AWFUL MIGHT HAPPEN: SEVERAL DAYS
2. NOT BEING ABLE TO STOP OR CONTROL WORRYING: MORE THAN HALF THE DAYS
8. IF YOU CHECKED OFF ANY PROBLEMS, HOW DIFFICULT HAVE THESE MADE IT FOR YOU TO DO YOUR WORK, TAKE CARE OF THINGS AT HOME, OR GET ALONG WITH OTHER PEOPLE?: SOMEWHAT DIFFICULT
GAD7 TOTAL SCORE: 10

## 2024-12-30 ASSESSMENT — PATIENT HEALTH QUESTIONNAIRE - PHQ9
SUM OF ALL RESPONSES TO PHQ QUESTIONS 1-9: 9
10. IF YOU CHECKED OFF ANY PROBLEMS, HOW DIFFICULT HAVE THESE PROBLEMS MADE IT FOR YOU TO DO YOUR WORK, TAKE CARE OF THINGS AT HOME, OR GET ALONG WITH OTHER PEOPLE: SOMEWHAT DIFFICULT
SUM OF ALL RESPONSES TO PHQ QUESTIONS 1-9: 9

## 2024-12-31 ENCOUNTER — OFFICE VISIT (OUTPATIENT)
Dept: FAMILY MEDICINE | Facility: CLINIC | Age: 34
End: 2024-12-31
Payer: COMMERCIAL

## 2024-12-31 VITALS
HEIGHT: 65 IN | SYSTOLIC BLOOD PRESSURE: 101 MMHG | HEART RATE: 72 BPM | TEMPERATURE: 98.2 F | BODY MASS INDEX: 31.99 KG/M2 | OXYGEN SATURATION: 97 % | WEIGHT: 192 LBS | DIASTOLIC BLOOD PRESSURE: 68 MMHG | RESPIRATION RATE: 16 BRPM

## 2024-12-31 DIAGNOSIS — E03.9 ACQUIRED HYPOTHYROIDISM: Primary | ICD-10-CM

## 2024-12-31 DIAGNOSIS — E03.9 ACQUIRED HYPOTHYROIDISM: ICD-10-CM

## 2024-12-31 DIAGNOSIS — E66.811 CLASS 1 OBESITY DUE TO EXCESS CALORIES WITHOUT SERIOUS COMORBIDITY WITH BODY MASS INDEX (BMI) OF 31.0 TO 31.9 IN ADULT: ICD-10-CM

## 2024-12-31 DIAGNOSIS — E66.09 CLASS 1 OBESITY DUE TO EXCESS CALORIES WITHOUT SERIOUS COMORBIDITY WITH BODY MASS INDEX (BMI) OF 31.0 TO 31.9 IN ADULT: Primary | ICD-10-CM

## 2024-12-31 DIAGNOSIS — E66.09 CLASS 1 OBESITY DUE TO EXCESS CALORIES WITHOUT SERIOUS COMORBIDITY WITH BODY MASS INDEX (BMI) OF 31.0 TO 31.9 IN ADULT: ICD-10-CM

## 2024-12-31 DIAGNOSIS — E66.811 CLASS 1 OBESITY DUE TO EXCESS CALORIES WITHOUT SERIOUS COMORBIDITY WITH BODY MASS INDEX (BMI) OF 31.0 TO 31.9 IN ADULT: Primary | ICD-10-CM

## 2024-12-31 LAB — TSH SERPL DL<=0.005 MIU/L-ACNC: 0.12 UIU/ML (ref 0.3–4.2)

## 2024-12-31 PROCEDURE — 99213 OFFICE O/P EST LOW 20 MIN: CPT

## 2024-12-31 PROCEDURE — 36415 COLL VENOUS BLD VENIPUNCTURE: CPT

## 2024-12-31 PROCEDURE — 84443 ASSAY THYROID STIM HORMONE: CPT

## 2024-12-31 RX ORDER — SERTRALINE HYDROCHLORIDE 100 MG/1
100 TABLET, FILM COATED ORAL DAILY
Qty: 90 TABLET | Refills: 3 | Status: SHIPPED | OUTPATIENT
Start: 2024-12-31

## 2024-12-31 RX ORDER — LEVOTHYROXINE SODIUM 125 UG/1
125 TABLET ORAL
Qty: 60 TABLET | Refills: 0 | Status: SHIPPED | OUTPATIENT
Start: 2024-12-31 | End: 2025-01-02

## 2024-12-31 ASSESSMENT — ENCOUNTER SYMPTOMS: NERVOUS/ANXIOUS: 1

## 2024-12-31 NOTE — PROGRESS NOTES
{PROVIDER CHARTING PREFERENCE:591777}  If not covered will try Contrave or just bupropion.   Jenifer Ramires is a 34 year old, presenting for the following health issues:  Depression, Anxiety, and Weight Problem (Pt here for a Depression,anxiety medcheck and to discuss weight issues)      12/31/2024     9:57 AM   Additional Questions   Roomed by Chas Gaston    History of Present Illness       Mental Health Follow-up:  Patient presents to follow-up on Depression & Anxiety.Patient's depression since last visit has been:  Medium  The patient is having other symptoms associated with depression.  Patient's anxiety since last visit has been:  Medium  The patient is not having other symptoms associated with anxiety.  Any significant life events: financial concerns and other  Patient is feeling anxious or having panic attacks.  Patient has no concerns about alcohol or drug use.    Reason for visit:  Weight issues/anxiety/ depression    She eats 2-3 servings of fruits and vegetables daily.She consumes 2 sweetened beverage(s) daily.She exercises with enough effort to increase her heart rate 30 to 60 minutes per day.  She exercises with enough effort to increase her heart rate 3 or less days per week. She is missing 1 dose(s) of medications per week.  She is not taking prescribed medications regularly due to remembering to take.       {MA/LPN/RN Pre-Provider Visit Orders- hCG/UA/Strep (Optional):567706}  Depression and Anxiety   How are you doing with your depression since your last visit? Improved   How are you doing with your anxiety since your last visit?  Improved   Are you having other symptoms that might be associated with depression or anxiety? No  Have you had a significant life event? No   Do you have any concerns with your use of alcohol or other drugs? No    Social History     Tobacco Use    Smoking status: Never     Passive exposure: Never    Smokeless tobacco: Never   Vaping Use    Vaping status:  Never Used   Substance Use Topics    Alcohol use: Not Currently     Alcohol/week: 3.0 standard drinks of alcohol     Types: 3 Standard drinks or equivalent per week    Drug use: No         10/2/2024     8:30 AM 11/5/2024     8:14 PM 12/30/2024     9:50 PM   PHQ   PHQ-9 Total Score 11 13  9    Q9: Thoughts of better off dead/self-harm past 2 weeks Not at all Not at all Not at all       Patient-reported         8/20/2024    10:09 AM 11/5/2024     8:18 PM 12/30/2024     9:54 PM   CORNELIA-7 SCORE   Total Score 10 (moderate anxiety) 10 (moderate anxiety) 10 (moderate anxiety)   Total Score 10 10  10        Patient-reported         12/30/2024     9:50 PM   Last PHQ-9   1.  Little interest or pleasure in doing things 2   2.  Feeling down, depressed, or hopeless 2   3.  Trouble falling or staying asleep, or sleeping too much 1   4.  Feeling tired or having little energy 1   5.  Poor appetite or overeating 1   6.  Feeling bad about yourself 1   7.  Trouble concentrating 1   8.  Moving slowly or restless 0   Q9: Thoughts of better off dead/self-harm past 2 weeks 0   PHQ-9 Total Score 9        Patient-reported         12/30/2024     9:54 PM   CORNELIA-7    1. Feeling nervous, anxious, or on edge 2   2. Not being able to stop or control worrying 2   3. Worrying too much about different things 1   4. Trouble relaxing 2   5. Being so restless that it is hard to sit still 1   6. Becoming easily annoyed or irritable 1   7. Feeling afraid, as if something awful might happen 1   CORNELIA-7 Total Score 10    If you checked any problems, how difficult have they made it for you to do your work, take care of things at home, or get along with other people? Somewhat difficult       Patient-reported       Suicide Assessment Five-step Evaluation and Treatment (SAFE-T)  {Provider  Link to Depression Care Package SmartSet :330648}  How many servings of fruits and   {additonal problems for provider to add (Optional):583022}    {ROS Picklists  "(Optional):178716}      Objective    /68 (BP Location: Right arm, Patient Position: Sitting, Cuff Size: Adult Large)   Pulse 72   Temp 98.2  F (36.8  C) (Tympanic)   Resp 16   Ht 1.651 m (5' 5\")   Wt 87.1 kg (192 lb)   LMP 10/22/2024 (Exact Date)   SpO2 97%   BMI 31.95 kg/m    Body mass index is 31.95 kg/m .  Physical Exam   {Exam List (Optional):611878}    {Diagnostic Test Results (Optional):977425}        Signed Electronically by: SYED Chinchilla CNP  {Email feedback regarding this note to [email or web address]   :821749}  "

## 2025-01-02 RX ORDER — LEVOTHYROXINE SODIUM 125 UG/1
TABLET ORAL
Qty: 90 TABLET | Refills: 0 | Status: SHIPPED | OUTPATIENT
Start: 2025-01-02

## 2025-01-02 NOTE — TELEPHONE ENCOUNTER
Signed 2 days ago (12/31/2024):   levothyroxine (SYNTHROID/LEVOTHROID) 125 MCG tablet   Receipt confirmed by pharmacy (12/31/2024  3:58 PM CST)

## 2025-01-10 ENCOUNTER — TELEPHONE (OUTPATIENT)
Dept: FAMILY MEDICINE | Facility: CLINIC | Age: 35
End: 2025-01-10
Payer: COMMERCIAL

## 2025-01-10 NOTE — TELEPHONE ENCOUNTER
Prior Authorization Retail Medication Request    Medication/Dose: semaglutide-weight management (WEGOVY) 0.25 MG/0.5ML pen  Diagnosis and ICD code (if different than what is on RX):    New/renewal/insurance change PA/secondary ins. PA:  Previously Tried and Failed:    Rationale:      Insurance   Primary:   Insurance ID:      Secondary (if applicable):  Insurance ID:      Pharmacy Information (if different than what is on RX)  Name:    Phone:    Fax:    Clinic Information  Preferred routing pool for dept communication:

## 2025-01-14 ENCOUNTER — MYC MEDICAL ADVICE (OUTPATIENT)
Dept: FAMILY MEDICINE | Facility: CLINIC | Age: 35
End: 2025-01-14
Payer: COMMERCIAL

## 2025-01-14 DIAGNOSIS — E66.09 CLASS 1 OBESITY DUE TO EXCESS CALORIES WITHOUT SERIOUS COMORBIDITY WITH BODY MASS INDEX (BMI) OF 31.0 TO 31.9 IN ADULT: Primary | ICD-10-CM

## 2025-01-14 DIAGNOSIS — E66.811 CLASS 1 OBESITY DUE TO EXCESS CALORIES WITHOUT SERIOUS COMORBIDITY WITH BODY MASS INDEX (BMI) OF 31.0 TO 31.9 IN ADULT: Primary | ICD-10-CM

## 2025-01-14 NOTE — TELEPHONE ENCOUNTER
PRIOR AUTHORIZATION DENIED    Medication: WEGOVY 0.25 MG/0.5ML SC SOAJ    Insurance Company: [employer] Non-Specialty PA's - Phone [phone] Fax [phone]    Denial Date: 1/14/2025    Denial Reason(s): Excluded

## 2025-02-05 ENCOUNTER — TELEPHONE (OUTPATIENT)
Dept: FAMILY MEDICINE | Facility: CLINIC | Age: 35
End: 2025-02-05
Payer: COMMERCIAL

## 2025-02-05 NOTE — TELEPHONE ENCOUNTER
Prior Authorization Retail Medication Request    Medication/Dose: Contrave 8- 90mg  Diagnosis and ICD code (if different than what is on RX):    New/renewal/insurance change PA/secondary ins. PA:  Previously Tried and Failed:    Rationale:      Insurance   Primary:   Insurance ID:      Secondary (if applicable):  Insurance ID:      Pharmacy Information (if different than what is on RX)  Name:    Phone:    Fax:    Clinic Information  Preferred routing pool for dept communication:

## 2025-02-10 NOTE — TELEPHONE ENCOUNTER
Retail Pharmacy Prior Authorization Team   Phone: [phone]    PA Initiation    Medication: Contrave 8- 90mg  Insurance Company: [employer] Non-Specialty PA's - Phone [phone] Fax [phone]  [facility]arm[facility]illing the Rx: [id number] [place] [address] AT [facility] [address]  Filling Pharmacy Phone: [phone]  Filling Pharmacy Fax: [phone]  Start Date: 2/9/2025

## 2025-02-13 ENCOUNTER — MYC MEDICAL ADVICE (OUTPATIENT)
Dept: FAMILY MEDICINE | Facility: CLINIC | Age: 35
End: 2025-02-13
Payer: COMMERCIAL

## 2025-02-13 DIAGNOSIS — E66.09 CLASS 1 OBESITY DUE TO EXCESS CALORIES WITHOUT SERIOUS COMORBIDITY WITH BODY MASS INDEX (BMI) OF 31.0 TO 31.9 IN ADULT: Primary | ICD-10-CM

## 2025-02-13 DIAGNOSIS — E66.811 CLASS 1 OBESITY DUE TO EXCESS CALORIES WITHOUT SERIOUS COMORBIDITY WITH BODY MASS INDEX (BMI) OF 31.0 TO 31.9 IN ADULT: Primary | ICD-10-CM

## 2025-02-13 RX ORDER — BUPROPION HYDROCHLORIDE 75 MG/1
TABLET ORAL
Qty: 113 TABLET | Refills: 0 | Status: SHIPPED | OUTPATIENT
Start: 2025-02-13 | End: 2025-04-14

## 2025-02-13 NOTE — TELEPHONE ENCOUNTER
PRIOR AUTHORIZATION DENIED    Medication: Contrave 8- 90mg - DENIED    Denial Date: 2/10/2025    Denial Rational: INSURANCE STATES MEDICATION IS EXCLUDED FROM COVERAGE.          Appeal Information: N/A - NO APPEAL AVAILABLE ON EXCLUDED MEDICATIONS FOR THIS MEMBER'S PLAN.

## 2025-04-20 DIAGNOSIS — E66.811 CLASS 1 OBESITY DUE TO EXCESS CALORIES WITHOUT SERIOUS COMORBIDITY WITH BODY MASS INDEX (BMI) OF 31.0 TO 31.9 IN ADULT: ICD-10-CM

## 2025-04-20 DIAGNOSIS — E66.09 CLASS 1 OBESITY DUE TO EXCESS CALORIES WITHOUT SERIOUS COMORBIDITY WITH BODY MASS INDEX (BMI) OF 31.0 TO 31.9 IN ADULT: ICD-10-CM

## 2025-04-21 RX ORDER — BUPROPION HYDROCHLORIDE 75 MG/1
TABLET ORAL
Qty: 113 TABLET | Refills: 0 | Status: SHIPPED | OUTPATIENT
Start: 2025-04-21

## 2025-07-07 DIAGNOSIS — E03.9 ACQUIRED HYPOTHYROIDISM: ICD-10-CM

## 2025-07-07 RX ORDER — LEVOTHYROXINE SODIUM 125 UG/1
TABLET ORAL
Qty: 90 TABLET | Refills: 0 | Status: SHIPPED | OUTPATIENT
Start: 2025-07-07

## 2025-08-10 ENCOUNTER — HEALTH MAINTENANCE LETTER (OUTPATIENT)
Age: 35
End: 2025-08-10